# Patient Record
Sex: FEMALE | Race: WHITE | NOT HISPANIC OR LATINO | Employment: UNEMPLOYED | URBAN - METROPOLITAN AREA
[De-identification: names, ages, dates, MRNs, and addresses within clinical notes are randomized per-mention and may not be internally consistent; named-entity substitution may affect disease eponyms.]

---

## 2017-08-31 ENCOUNTER — TRANSFERRED RECORDS (OUTPATIENT)
Dept: HEALTH INFORMATION MANAGEMENT | Facility: CLINIC | Age: 42
End: 2017-08-31

## 2017-09-12 ENCOUNTER — HOSPITAL ENCOUNTER (INPATIENT)
Facility: CLINIC | Age: 42
LOS: 1 days | Discharge: HOME | End: 2017-09-14
Attending: FAMILY MEDICINE | Admitting: FAMILY MEDICINE
Payer: MEDICAID

## 2017-09-12 DIAGNOSIS — F32.0 MILD SINGLE CURRENT EPISODE OF MAJOR DEPRESSIVE DISORDER (H): Primary | ICD-10-CM

## 2017-09-12 DIAGNOSIS — F11.10 HEROIN ABUSE (H): ICD-10-CM

## 2017-09-12 LAB
AMPHETAMINES UR QL SCN: NEGATIVE
BARBITURATES UR QL: NEGATIVE
BENZODIAZ UR QL: NEGATIVE
CANNABINOIDS UR QL SCN: NEGATIVE
COCAINE UR QL: NEGATIVE
ETHANOL UR QL SCN: NEGATIVE
HCG UR QL: NEGATIVE
OPIATES UR QL SCN: POSITIVE

## 2017-09-12 PROCEDURE — 80320 DRUG SCREEN QUANTALCOHOLS: CPT | Performed by: FAMILY MEDICINE

## 2017-09-12 PROCEDURE — 99285 EMERGENCY DEPT VISIT HI MDM: CPT | Mod: Z6 | Performed by: FAMILY MEDICINE

## 2017-09-12 PROCEDURE — 25000132 ZZH RX MED GY IP 250 OP 250 PS 637: Performed by: FAMILY MEDICINE

## 2017-09-12 PROCEDURE — 81025 URINE PREGNANCY TEST: CPT | Performed by: FAMILY MEDICINE

## 2017-09-12 PROCEDURE — 99285 EMERGENCY DEPT VISIT HI MDM: CPT | Mod: 25 | Performed by: FAMILY MEDICINE

## 2017-09-12 PROCEDURE — 80307 DRUG TEST PRSMV CHEM ANLYZR: CPT | Performed by: FAMILY MEDICINE

## 2017-09-12 PROCEDURE — HZ2ZZZZ DETOXIFICATION SERVICES FOR SUBSTANCE ABUSE TREATMENT: ICD-10-PCS | Performed by: FAMILY MEDICINE

## 2017-09-12 RX ORDER — LORAZEPAM 1 MG/1
1 TABLET ORAL ONCE
Status: COMPLETED | OUTPATIENT
Start: 2017-09-12 | End: 2017-09-12

## 2017-09-12 RX ADMIN — LORAZEPAM 1 MG: 1 TABLET ORAL at 20:40

## 2017-09-12 ASSESSMENT — ENCOUNTER SYMPTOMS
ABDOMINAL PAIN: 1
COLOR CHANGE: 0
WEAKNESS: 0
NAUSEA: 1
SHORTNESS OF BREATH: 0
FEVER: 0
HALLUCINATIONS: 0
DIFFICULTY URINATING: 0
CONFUSION: 0
ARTHRALGIAS: 0
NECK STIFFNESS: 0
HEADACHES: 0
DECREASED CONCENTRATION: 1
DYSPHORIC MOOD: 1
EYE REDNESS: 0
WOUND: 1

## 2017-09-12 NOTE — IP AVS SNAPSHOT
[facility]    [facility] [place] [address]    Phone:  [phone]                                       After Visit Summary   9/12/2017    Serenity Jolly    MRN: [id number]           After Visit Summary Signature Page     I have received my discharge instructions, and my questions have been answered. I have discussed any challenges I see with this plan with the nurse or doctor.    ..........................................................................................................................................  Patient/Patient Representative Signature      ..........................................................................................................................................  Patient Representative Print Name and Relationship to Patient    ..................................................               ................................................  Date                                            Time    ..........................................................................................................................................  Reviewed by Signature/Title    ...................................................              ..............................................  Date                                                            Time

## 2017-09-12 NOTE — IP AVS SNAPSHOT
MRN:[id number]                      After Visit Summary   9/12/2017    Sereniyt Jolly    MRN: [id number]           Thank you!     Thank you for choosing [facility] for your care. Our goal is always to provide you with excellent care.        Patient Information     Date Of Birth          1975        Designated Caregiver       Most Recent Value    Caregiver    Will someone help with your care after discharge? no      About your hospital stay     You were admitted on:  September 13, 2017 You last received care in the:  [facility]    You were discharged on:  September 14, 2017       Who to Call     For medical emergencies, please call 911.  For non-urgent questions about your medical care, please call your primary care provider or clinic, None          Attending Provider     Provider Specialty    Payam Lange MD Emergency Medicine    Ron Bull MD Family Practice       Primary Care Provider    Physician No Ref-Primary      Further instructions from your care team       Behavioral Discharge Planning and Instructions  THANK YOU FOR CHOOSING THE [address]  [phone]    Summary: You were admitted to [facility] on 9/12/17 for detoxification from opiates.  A medical exam was performed that included lab work. You have met with a  and opted to admit to [facility].  Please take care and make your recovery a priority Serenity! It was a pleasure working with you and the treatment team wishes you the very best in your recovery!  ~Gary    Recommendation:  Residential Treatment, medication assisted therapy, psychotherapy, sober support group engagement and active work with a sponsor or  through [facility] Connection.    Main Diagnosis: Per Dr. Ron Bull MD;  304.00 (F11.20) Opioid Use Disorder Severe      Major Treatments, Procedures and Findings:  You have withdrawn from opiates  using Methodone.  You  have had a chemical dependency assessment.  You have had labs drawn and those results have been reviewed with you. Please take a copy of your lab work with you to your next primary care physician appointment.    Symptoms to Report:  If you experience more anxiety, confusion, sleeplessness, deep sadness or thoughts of suicide, notify your treatment team or notify your primary care physician. IF ANY OF THE SYMPTOMS YOU ARE EXPERIENCING ARE A MEDICAL EMERGENCY CALL 911 IMMEDIATELY.     Lifestyle Adjustment: Adjust your lifestyle to get enough sleep, relaxation, exercise and  good nutrition. Continue to develop healthy coping skills to decrease stress and promote a sober living environment. Do not use alcohol, illegal drugs or addictive medications other than what is currently prescribed. AA, NA, and  Sponsor are excellent resources for support. There has been an increase in opioid overdoses and deaths recently. Even after a short period of no drug use a persons tolerance level is lowered. It is not safe to think a person can use the same amount of drugs as they did prior to their period of no drug use. Returning to your drug using environment and contact with your drug using friends puts you at high risk for relapse as well as death associated with an overdose. www.harmreduction.org      Primary Provider:  You will schedule a follow up appointment while you are in treatment  [facility] [address] [place] [address] [phone]      Methadone Maintenance:  You report that maintenance will be set up with Joe through [facility] staff.    DISCHARGE RESOURCES:  -SMART Recovery - self management for addiction recovery:  www.smartrecovery.org    -Pathways ~ A Health Crisis Resource & Support Center: 890.195.5265.  -[facility] [phone]   -[facility] Behavioral Intake 397-291-1798 or 525-619-7451.  -Crisis Intervention: 178.758.8552 or  227.109.7837 (TTY: 162.935.6307).  Call anytime.  -Suicide Awareness Voices of Education (SAVE) (www.save.org): 908-876-XKKP (1893)  -National Suicide Prevention Line (www.mentalhealthmn.org): 738-617-SSVU (3521)  -National [place]ford on Mental Illness (www.mn.shell.org): 471.984.5643 or 412-667-6679.  -Bcdl5uxkh: text the word LIFE to 217[address] for immediate support and crisis intervention  -Mental Health Consumer/Survivor Network of [place] (www.mhcsn.net): 302.822.3306 or 082-915-5924  -Mental Health Association of [place] (www.mentalhealth.org): 649.948.9287 or 747-694-9295     -Substance Abuse and Mental Health Services (www.samhsa.gov)  -Harm Reduction Coalition (www. Harmreduction.org)  -www.prescribetoprevent.org or http://prescribetoprevent.org/video  -Poison control 0-102-544-5562   **[place] Opioid Prevention Coalition: www.opioidcoalition.org    Sober Support Group Information:  AA/NA & Sponsor/Support  -Alcoholics Anonymous (www.alcoholics-anonymous.org): for local information 24 hours/day  -AA Intergroup service office in [place] (http://www.aastpaul.org/) 459.806.8096  -AA Intergroup service office in [facility]: 377.991.9001. (http://www.aamin[place].org/)  -Narcotics Anonymous (www.nam[place].org) (719) 359-4289   **Sober Fun Activities: www.sober-activities.Advice Wallet.CTAdventure Sp. z o.o./[facility]//[facility] Recovery Connection ([facility])  [facility] connects people seeking recovery to resources that help foster and sustain long-term recovery.  Whether you are seeking resources for treatment, transportation, housing, job training, education, health care or other pathways to recovery, [facility] is a great place to start.    Phone: 189.140.8022.  www.[place]Innovations.Zipdial (Great listing of all types of recovery and non-recovery related resources)    General Medication Instructions:   See your medication sheet(s) for instructions.   Take all medicines as directed.  Make no changes unless your doctor suggests them.   Go to all your  "doctor visits.  Be sure to have all your required lab tests. This way, your medicines can be refilled on time.  Do not use any drugs not prescribed by your provider.  AA/NA and Sponsors are excellent resources for support  Avoid alcohol.    Any follow up concerns:  Nursing questions call the [facility] [phone]  Medical Record call [phone]  Outpatient Behavioral Intake call [phone]  LP+ Wait List/Bed Availability call [phone] ([place])    The entire treatment team has appreciated the opportunity to work with you Serenity.  We wish you the best in the future and with your lifelong recovery goals. Please bring this discharge folder with you to all follow up appointments.  It contains your lab results, diagnosis, medication list and discharge recommendations.    THANK YOU FOR CHOOSING THE [facility]     Pending Results     Date and Time Order Name Status Description    9/14/2017 0030 Hepatitis C antibody In process     9/14/2017 0030 HIV Antigen Antibody Combo In process             Statement of Approval     Ordered          09/14/17 0806  I have reviewed and agree with all the recommendations and orders detailed in this document.  EFFECTIVE NOW     Approved and electronically signed by:  Ron Bull MD             Admission Information     Date & Time Provider Department Dept. Phone    9/12/2017 Ron Bull MD [facility] [phone]      Your Vitals Were     Blood Pressure Pulse Temperature Respirations Height Weight    135/87 83 98.2  F (36.8  C) 16 1.676 m (5' 6\") 62.1 kg (137 lb)    Last Period Pulse Oximetry BMI (Body Mass Index)             09/12/2017 (Exact Date) 98% 22.11 kg/m2         [employer] Information     [employer] lets you send messages to your doctor, view your test results, renew your prescriptions, schedule appointments and more. To sign up, go to [email or web address] . Click on \"Log in\" on the left side " "of the screen, which will take you to the Welcome page. Then click on \"Sign up Now\" on the right side of the page.     You will be asked to enter the access code listed below, as well as some personal information. Please follow the directions to create your username and password.     Your access code is: [email or web address]  Expires: 2017  8:59 AM     Your access code will  in 90 days. If you need help or a new code, please call your [facility] clinic or [phone].        Care EveryWhere ID     This is your Care EveryWhere ID. This could be used by other organizations to access your [facility] medical records  [id number]        Equal Access to Services     BREN ZELAYA : Irvin Bhandari, nora parekh, ruchi howell, shalini heard. So [facility] [phone].    ATENCIÓN: Si habla español, tiene a restrepo disposición servicios gratuitos de asistencia lingüística. Llame al [phone].    We comply with applicable federal civil rights laws and [place] laws. We do not discriminate on the basis of race, color, national origin, age, disability sex, sexual orientation or gender identity.               Review of your medicines      CONTINUE these medicines which may have CHANGED, or have new prescriptions. If we are uncertain of the size of tablets/capsules you have at home, strength may be listed as something that might have changed.        Dose / Directions    PARoxetine 30 MG tablet   Commonly known as:  PAXIL   This may have changed:  medication strength   Used for:  Mild single current episode of major depressive disorder (H)        Dose:  30 mg   Take 1 tablet (30 mg) by mouth daily   Quantity:  30 tablet   Refills:  1            Where to get your medicines      These medications were sent to [facility] [place], [place] - [address]  [address] [facility] [address] [address]     Phone:  [phone]     PARoxetine 30 MG tablet             "    Protect others around you: Learn how to safely use, store and throw away your medicines at www.disposemymeds.org.             Medication List: This is a list of all your medications and when to take them. Check marks below indicate your daily home schedule. Keep this list as a reference.      Medications           Morning Afternoon Evening Bedtime As Needed    PARoxetine 30 MG tablet   Commonly known as:  PAXIL   Take 1 tablet (30 mg) by mouth daily   Last time this was given:  30 mg on 9/14/2017  8:19 AM

## 2017-09-13 PROBLEM — F11.10 HEROIN ABUSE (H): Status: ACTIVE | Noted: 2017-09-13

## 2017-09-13 PROCEDURE — 25000132 ZZH RX MED GY IP 250 OP 250 PS 637: Performed by: FAMILY MEDICINE

## 2017-09-13 PROCEDURE — 12800012 ZZH R&B CD MH INTERMEDIATE ADULT

## 2017-09-13 PROCEDURE — 99222 1ST HOSP IP/OBS MODERATE 55: CPT | Performed by: FAMILY MEDICINE

## 2017-09-13 RX ORDER — LORAZEPAM 1 MG/1
1 TABLET ORAL
Status: COMPLETED | OUTPATIENT
Start: 2017-09-13 | End: 2017-09-13

## 2017-09-13 RX ORDER — HYDROXYZINE HYDROCHLORIDE 25 MG/1
25-50 TABLET, FILM COATED ORAL EVERY 4 HOURS PRN
Status: DISCONTINUED | OUTPATIENT
Start: 2017-09-13 | End: 2017-09-14 | Stop reason: HOSPADM

## 2017-09-13 RX ORDER — GABAPENTIN 300 MG/1
300 CAPSULE ORAL 3 TIMES DAILY
Status: DISCONTINUED | OUTPATIENT
Start: 2017-09-13 | End: 2017-09-14 | Stop reason: HOSPADM

## 2017-09-13 RX ORDER — TRAZODONE HYDROCHLORIDE 100 MG/1
100 TABLET ORAL
Status: DISCONTINUED | OUTPATIENT
Start: 2017-09-13 | End: 2017-09-14 | Stop reason: HOSPADM

## 2017-09-13 RX ORDER — METHADONE HYDROCHLORIDE 5 MG/5ML
30 SOLUTION ORAL ONCE
Status: COMPLETED | OUTPATIENT
Start: 2017-09-13 | End: 2017-09-13

## 2017-09-13 RX ADMIN — NICOTINE POLACRILEX 4 MG: 4 GUM, CHEWING ORAL at 14:53

## 2017-09-13 RX ADMIN — NICOTINE POLACRILEX 4 MG: 4 GUM, CHEWING ORAL at 16:14

## 2017-09-13 RX ADMIN — PAROXETINE HYDROCHLORIDE 30 MG: 20 TABLET, FILM COATED ORAL at 08:20

## 2017-09-13 RX ADMIN — HYDROXYZINE HYDROCHLORIDE 50 MG: 25 TABLET ORAL at 06:24

## 2017-09-13 RX ADMIN — HYDROXYZINE HYDROCHLORIDE 50 MG: 25 TABLET ORAL at 01:16

## 2017-09-13 RX ADMIN — NICOTINE POLACRILEX 4 MG: 4 GUM, CHEWING ORAL at 17:58

## 2017-09-13 RX ADMIN — METHADONE HYDROCHLORIDE 30 MG: 5 SOLUTION ORAL at 10:12

## 2017-09-13 RX ADMIN — GABAPENTIN 300 MG: 300 CAPSULE ORAL at 14:53

## 2017-09-13 RX ADMIN — HYDROXYZINE HYDROCHLORIDE 50 MG: 25 TABLET ORAL at 13:07

## 2017-09-13 RX ADMIN — LORAZEPAM 1 MG: 1 TABLET ORAL at 01:16

## 2017-09-13 RX ADMIN — HYDROXYZINE HYDROCHLORIDE 50 MG: 25 TABLET ORAL at 17:58

## 2017-09-13 ASSESSMENT — ACTIVITIES OF DAILY LIVING (ADL)
LAUNDRY: WITH SUPERVISION
GROOMING: INDEPENDENT
DRESS: INDEPENDENT
ORAL_HYGIENE: INDEPENDENT

## 2017-09-13 NOTE — ED PROVIDER NOTES
[facility] EMERGENCY DEPARTMENT ([facility])    9/12/17       History     Chief Complaint   Patient presents with     Addiction Problem     has a bed reserved. last used IV heroin 2 days ago, used about 1/2 gm. Per pt she uses heroin everyday. Denies any other illicit drug use or alcohol use     HPI  Serenity Jolly is a 42 year old female with a medical history of cerebral infarction who presents to the Emergency Department for evaluation of an addiction problem to heroin. Patient reports using approximately 0.5 grams of IV heroin a day since January of 2016 with her last use yesterday. Prior to that, patient had an alcohol problem, she went through treatment for alcohol; but then started using heroin. She denies any alcohol use since January of 2016. She denies any history of hallucinations, suicidal ideation, homicidal ideation or treatment/detoxy since 1/2016. She reports using heroin mainly in the wrists, but denies any infections at the IV sites. Patient has a detox bed reserved. She is wanting to get into a methadone program. She notes getting Suboxone on the street in the past.    I have reviewed the Medications, Allergies, Past Medical and Surgical History, and Social History in the App[employer] system.    Past Medical History:   Diagnosis Date     Cerebral infarction (H)     2 heart attacks 4/2016       Past Surgical History:   Procedure Laterality Date     COSMETIC SURGERY       ENT SURGERY         No family history on file.    Social History   Substance Use Topics     Smoking status: Current Every Day Smoker     Smokeless tobacco: Never Used     Alcohol use No       No current facility-administered medications for this encounter.      Current Outpatient Prescriptions   Medication     PARoxetine HCl (PAXIL PO)        Allergies   Allergen Reactions     Sulfa Drugs Hives         Review of Systems   Constitutional: Negative for fever.   HENT: Negative for congestion.    Eyes: Negative for redness.  "  Respiratory: Negative for shortness of breath.    Cardiovascular: Negative for chest pain.   Gastrointestinal: Positive for abdominal pain and nausea.   Genitourinary: Negative for difficulty urinating.   Musculoskeletal: Negative for arthralgias and neck stiffness.   Skin: Positive for wound (injection left and right). Negative for color change.   Neurological: Negative for syncope, weakness and headaches.   Psychiatric/Behavioral: Positive for decreased concentration and dysphoric mood. Negative for confusion, hallucinations and suicidal ideas.       Physical Exam   BP: 136/90  Pulse: 99  Temp: 97.1  F (36.2  C)  Resp: 16  Height: 167.6 cm (5' 6\")  Weight: 62.3 kg (137 lb 6.4 oz)  SpO2: 100 %  Physical Exam   Constitutional: She is oriented to person, place, and time. She appears well-developed and well-nourished. She appears distressed.   Patient eating in mild distress cooperative     HENT:   Head: Normocephalic and atraumatic.   Eyes: Conjunctivae and EOM are normal. Pupils are equal, round, and reactive to light. No scleral icterus.   Neck: Normal range of motion. Neck supple.   Cardiovascular: Regular rhythm.    Pulmonary/Chest: No stridor. No respiratory distress.   Abdominal: She exhibits no distension. There is no tenderness.   Musculoskeletal: She exhibits no edema, tenderness or deformity.   Neurological: She is alert and oriented to person, place, and time. She has normal reflexes. No cranial nerve deficit. Coordination normal.   Skin: Skin is warm and dry. No rash noted. She is not diaphoretic. No erythema. No pallor.   Injection of left and right forearms without infection or phlebitis     Psychiatric:   Flat but approp no SI or HI or delusional thoughts.   Nursing note and vitals reviewed.      ED Course   7:29 PM  The patient was seen and examined by Payam Lange MD in Room ED12.     ED Course     Patient eval in the ER.  Drug screen positive for opiates.  Patient was upset waiting for bed.  Told " "nurse she was going to leave and overdose on heroin.  Patient given ativan 1mg po and resting.  Patient told tech later that she \"was just kidding\"  Patient not currently SI  Is voluntary admit to 3A detox.  Orders written.          Procedures             Critical Care time:  none             Labs Ordered and Resulted from Time of ED Arrival Up to the Time of Departure from the ED   DRUG ABUSE SCREEN 6 CHEM DEP URINE ([facility]) - Abnormal; Notable for the following:        Result Value    Opiates Qualitative Urine Positive (*)     All other components within normal limits   HCG QUALITATIVE URINE     Results for orders placed or performed during the hospital encounter of 09/12/17   Drug abuse screen 6 urine (tox)   Result Value Ref Range    Amphetamine Qual Urine Negative NEG^Negative    Barbiturates Qual Urine Negative NEG^Negative    Benzodiazepine Qual Urine Negative NEG^Negative    Cannabinoids Qual Urine Negative NEG^Negative    Cocaine Qual Urine Negative NEG^Negative    Ethanol Qual Urine Negative NEG^Negative    Opiates Qualitative Urine Positive (A) NEG^Negative   HCG qualitative urine   Result Value Ref Range    HCG Qual Urine Negative NEG^Negative            Assessments & Plan (with Medical Decision Making)  43 yo female hx of heroin abuse for last 1 1/2 years. Prev etoh treatment, now IV heroin 1/2 gram IV daily. To er for detox. Last used yesterday. Stable. Patient denies current SI. Voluntary admit to 3A detox. Dr. Bull.         I have reviewed the nursing notes.    I have reviewed the findings, diagnosis, plan and need for follow up with the patient.    New Prescriptions    No medications on file       Final diagnoses:   Heroin abuse     I, Mayco Veliz, am serving as a trained medical scribe to document services personally performed by Payam Lange MD, based on the provider's statements to me.   I, Payam Lange MD, was physically present and have reviewed and verified the accuracy of this note " documented by Mayco Veliz.    9/12/2017   [facility], [place], EMERGENCY DEPARTMENT    This note was created at least in part by the use of dragon voice dictation system. Inadvertent typographical errors may still exist.  Payam Lange MD.         Payam Lange MD  09/12/17 9009

## 2017-09-13 NOTE — ED NOTES
"Pt. came out of room upset that admission was taking so long and wanted to leave.  Pt. stated \"this is Bull shit and all your doing is making me want to use.\"  \"Let me go so I can go out and OD on Heroin,\"  MD notified of statement and pt. placed on [place].  Changed into scrubs and placed on pt. watch.  "

## 2017-09-13 NOTE — H&P
DATE OF ADMISSION:  09/12/2017      CHIEF COMPLAINT:  Opiate dependence.      HISTORY OF PRESENT ILLNESS:  This is the first [facility] admission for Serenity Jolly who is a 42-year-old female.  Patient is living with her parents and her children in [place], [place].  She relocated from [place] just a month ago.  The patient was living in [place] with her kids for many years prior to moving here.  She is originally from [place] and brings her kids here every summer.  She relocated because of a loss of housing in [place] due to problems with the landlord and financial issues.      Patient enters [facility] to detox from heroin.  She has had a history of addiction for some time.  She went through treatment a year and a half ago in [place].  At that time, alcohol and cocaine were her drugs of choice.  After leaving, she started to use heroin as she met many people in treatment that were using heroin.  She started to smoke black tar heroin and became addicted to it.  She admits to progression, adverse effects, and inability to control since moving to [place] about a month ago.  She has been injecting [place] white heroin.  She has withdrawal when she tries to stop.  She now would like to go to treatment at [facility] and is interested in methadone maintenance.  We discussed the pros and cons of methadone maintenance versus buprenorphine maintenance and she is still considering her options.  She now enters for further evaluation and therapy.  Lately, she has been using only heroin.  She has used cocaine and alcohol in the past as noted above.  She denies any history of hepatitis.      She is not working.  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  She has 4 children, 1 grown.      PAST MEDICAL HISTORY:  Medical illnesses:  Patient had cardiac issues related to cocaine.  She says she had 2 heart attacks and congestive heart failure but is doing  well and is on no medications.  She did not require an angioplasty nor a surgery for her cardiac disease.  There is reference in the chart to cerebral infarction, although this is incorrect; she knows nothing about this.      Denies lung disease, liver disease, kidney disease, diabetes or epilepsy.  She has had a seizure either related to cocaine use or withdrawal in the past.      PAST SURGICAL HISTORY:  Breast augmentation, ovarian cyst surgery.      MEDICATIONS PRIOR TO ADMISSION:  Paxil 30 mg daily.      REVIEW OF SYSTEMS:   SKIN:  No rashes.   HEAD:  No headaches.   EYES:  No visual disturbance.   EARS:  No hearing loss.   MOUTH AND THROAT:  No difficulty swallowing.   PULMONARY:  No cough or shortness of breath.   CARDIOVASCULAR:  No chest pain.   GASTROINTESTINAL:  No nausea, vomiting, diarrhea or constipation.   GENITOURINARY:  Negative.   MUSCULOSKELETAL:  Negative.   NEUROLOGIC:  Negative.      PHYSICAL EXAMINATION:   VITAL SIGNS:  Temperature is 98, pulse is 80, respirations 20, blood pressure is 140/80.   GENERAL:  This is a well-developed, well-nourished 42-year-old female in no apparent distress, alert and cooperative and oriented to time, person and place.   SKIN:  Ecchymoses on the forearms, left greater than right, from IV drug injection.  No abscesses are seen.   HEAD:  Normal.   EYES:  Pupils equal, round, regular and reactive to light.  Conjunctivae normal.  Sclerae normal.   EARS:  Normal.   NOSE:  Normal.   MOUTH AND THROAT:  Lips normal.  Tongue normal.  Pharynx normal.   NECK:  Supple.  No masses, no thyroid enlargement.  Lymph nodes normal anterior and posterior cervical region.   PULMONARY:  Lungs clear to auscultation, good inspiration, good expiration, no wheezes, no rhonchi, no rales.   CARDIOVASCULAR:  Heart regular rate and rhythm, no murmurs.  Good peripheral pulses, no edema.   GASTROINTESTINAL:  Abdomen soft, no distention, tenderness or rigidity.  Bowel sounds normal.  No masses,  no organomegaly.   EXTREMITIES:  Full range of motion of all the extremities.  No inflammation of the ankles, knees, hips, hands, shoulders or elbows bilaterally.   NEUROLOGIC:  Motor normal.  Sensory normal.  Coordination normal.   MENTAL STATUS:  Oriented to time, place, person and situation.  Attitude is cooperative.  Insight fair, judgment fair.      ASSESSMENT:   1.  Opioid dependence.   2.  History of cocaine dependence.   3.  History of alcohol dependence.      PLAN:  Detox with methadone.  Patient is interested in methadone maintenance at the [facility] and treatment at [facility].  I discussed with her the possibility of Suboxone maintenance and she will consider her options.         MARJAN BOLAÑOS MD             D: 2017 14:32   T: 2017 16:20   MT: BILL      Name:     GUI JENNINGS   MRN:      [id number]        Account:      [id number]   :      1975           Admitted:     [id number]      Document: [id number]

## 2017-09-14 VITALS
HEART RATE: 83 BPM | RESPIRATION RATE: 16 BRPM | WEIGHT: 137 LBS | TEMPERATURE: 98.2 F | BODY MASS INDEX: 22.02 KG/M2 | HEIGHT: 66 IN | OXYGEN SATURATION: 98 % | DIASTOLIC BLOOD PRESSURE: 87 MMHG | SYSTOLIC BLOOD PRESSURE: 135 MMHG

## 2017-09-14 LAB
ALBUMIN SERPL-MCNC: 3.7 G/DL (ref 3.4–5)
ALP SERPL-CCNC: 83 U/L (ref 40–150)
ALT SERPL W P-5'-P-CCNC: 21 U/L (ref 0–50)
ANION GAP SERPL CALCULATED.3IONS-SCNC: 7 MMOL/L (ref 3–14)
AST SERPL W P-5'-P-CCNC: 8 U/L (ref 0–45)
BILIRUB SERPL-MCNC: 0.3 MG/DL (ref 0.2–1.3)
BUN SERPL-MCNC: 12 MG/DL (ref 7–30)
CALCIUM SERPL-MCNC: 8.5 MG/DL (ref 8.5–10.1)
CHLORIDE SERPL-SCNC: 107 MMOL/L (ref 94–109)
CO2 SERPL-SCNC: 27 MMOL/L (ref 20–32)
CREAT SERPL-MCNC: 0.57 MG/DL (ref 0.52–1.04)
ERYTHROCYTE [DISTWIDTH] IN BLOOD BY AUTOMATED COUNT: 13.4 % (ref 10–15)
GFR SERPL CREATININE-BSD FRML MDRD: >90 ML/MIN/1.7M2
GLUCOSE SERPL-MCNC: 78 MG/DL (ref 70–99)
HCT VFR BLD AUTO: 37.5 % (ref 35–47)
HCV AB SERPL QL IA: NONREACTIVE
HGB BLD-MCNC: 12.7 G/DL (ref 11.7–15.7)
HIV 1+2 AB+HIV1 P24 AG SERPL QL IA: NONREACTIVE
MCH RBC QN AUTO: 31.8 PG (ref 26.5–33)
MCHC RBC AUTO-ENTMCNC: 33.9 G/DL (ref 31.5–36.5)
MCV RBC AUTO: 94 FL (ref 78–100)
PLATELET # BLD AUTO: 345 10E9/L (ref 150–450)
POTASSIUM SERPL-SCNC: 4.1 MMOL/L (ref 3.4–5.3)
PROT SERPL-MCNC: 7 G/DL (ref 6.8–8.8)
RBC # BLD AUTO: 3.99 10E12/L (ref 3.8–5.2)
SODIUM SERPL-SCNC: 141 MMOL/L (ref 133–144)
WBC # BLD AUTO: 8.7 10E9/L (ref 4–11)

## 2017-09-14 PROCEDURE — 25000132 ZZH RX MED GY IP 250 OP 250 PS 637: Performed by: FAMILY MEDICINE

## 2017-09-14 PROCEDURE — 99238 HOSP IP/OBS DSCHRG MGMT 30/<: CPT | Performed by: FAMILY MEDICINE

## 2017-09-14 PROCEDURE — 85027 COMPLETE CBC AUTOMATED: CPT | Performed by: FAMILY MEDICINE

## 2017-09-14 PROCEDURE — 87389 HIV-1 AG W/HIV-1&-2 AB AG IA: CPT | Performed by: FAMILY MEDICINE

## 2017-09-14 PROCEDURE — 86803 HEPATITIS C AB TEST: CPT | Performed by: FAMILY MEDICINE

## 2017-09-14 PROCEDURE — 80053 COMPREHEN METABOLIC PANEL: CPT | Performed by: FAMILY MEDICINE

## 2017-09-14 PROCEDURE — 36415 COLL VENOUS BLD VENIPUNCTURE: CPT | Performed by: FAMILY MEDICINE

## 2017-09-14 RX ORDER — METHADONE HYDROCHLORIDE 5 MG/5ML
30 SOLUTION ORAL ONCE
Status: COMPLETED | OUTPATIENT
Start: 2017-09-14 | End: 2017-09-14

## 2017-09-14 RX ORDER — PAROXETINE 30 MG/1
30 TABLET, FILM COATED ORAL DAILY
Qty: 30 TABLET | Refills: 1 | Status: SHIPPED | OUTPATIENT
Start: 2017-09-14 | End: 2018-10-13

## 2017-09-14 RX ADMIN — HYDROXYZINE HYDROCHLORIDE 50 MG: 25 TABLET ORAL at 04:12

## 2017-09-14 RX ADMIN — PAROXETINE HYDROCHLORIDE 30 MG: 20 TABLET, FILM COATED ORAL at 08:19

## 2017-09-14 RX ADMIN — METHADONE HYDROCHLORIDE 30 MG: 5 SOLUTION ORAL at 08:52

## 2017-09-14 RX ADMIN — GABAPENTIN 300 MG: 300 CAPSULE ORAL at 08:19

## 2017-09-14 RX ADMIN — NICOTINE POLACRILEX 4 MG: 4 GUM, CHEWING ORAL at 08:21

## 2017-09-14 NOTE — PROGRESS NOTES
09/13/17 0132   Patient Belongings   Did you bring any home meds/supplements to the hospital?  Yes   Disposition of meds  Sent to security/pharmacy per site process   Patient Belongings other (see comments)   Disposition of Belongings Tote, Security, Locked Drawer   Belongings Search Yes   Clothing Search Yes   Second Staff Gabrielle Wild: (3) black sweatshirts, pair of pink shoes, pair of white shoes, yellow sweatshirt, pink jacket, red coat, grey sweater, pink sweater, grey sweatpants, vikings scarf, black shirt, blue jeans, white top, pink top, white sock, blue sock, black sandals, brown sandals, yellow purse containing scissors an tweezers  White purse with personal stuff, Brown purse with personal stuff and slippers      Locked Drawer: Nestor with charging chord, cell phone cracked screen no  no wallet    Brown wallet, phone and .    SECURITY ENV # [id number]  - 11 VISA Cards, 1 MasterCard, [employer] Card, [place] Drivers License, Social Security Card, Non-Tez Card.    V[facility]     Security: $6.29 cash, non-tez ID    A               Admission:  I am responsible for any personal items that are not sent to the safe or pharmacy.  [facility] is not responsible for loss, theft or damage of any property in my possession.    Signature:  _________________________________ Date: _______  Time: _____                                              Staff Signature:  ____________________________ Date: ________  Time: _____      2nd Staff person, if patient is unable/unwilling to sign:    Signature: ________________________________ Date: ________  Time: _____     Discharge:  [facility] has returned all of my personal belongings:    Signature: _________________________________ Date: ________  Time: _____                                          Staff Signature:  ____________________________ Date: ________  Time: _____         
"Pt denies SI/SIB/HI/Hallucinations. Pt rates anxiety 8/10 and depression 8/10. Pt slept well.  Pts d/c plan would be [facility] or [facility]. CM updated about d/c plan.  Pt was slightly agitated about delay in medication administration this morning. Pt was able to remain calm and stated \"its been forever since I have gone this long without something. I am practicing patience today.\" Pt remain respectful and clam towards staff and peers.   Writer spoke with Dr. Bull and ordered 30mg Methadone per pts wish. Her plan is d/c on Methadone taper. One time order for 30mg per Dr. Bull. He will follow up with pt.   Pt stated she was accepted at [facility] and they have an opening tomorrow. She spoke with Gary. They do not do intake appts on Friday. If pt is unable to go tomorrow she will wait until Monday. Writer asked if bed would still be available and pt felt one would be.   "
Case Management Note  9/13/2017    Writer met with pt to initiate discharge planning. Pt reports she had a rule 25 completed at [facility] and has been approved for admission to [facility]. Pt signed GAIL's for [facility] and [facility]. Writer spoke with Mandy at [facility]. She reports they do not accept admissions on Fridays, but will see if they can make an exception. Writer informed Mandy, since pt just admitted to detox, she would not be able to admit there on Thursday, 9/14/17.    Addendum    Writer spoke with pt's 3A physician. He reports if pt will continue on methadone maintenance she can discharge on Thursday, 9/14/17. Pt reports she will continue on methadone maintenance and would like to discharge Thursday. Dr Bull notified. Mandy at [facility] notified. Pt will be picked up on Thursday morning 9/14/17 at 10:00 am. Case management complete.    Gary Mann MA, [facility]  
Pt discharged to [facility], picked up and transported by [facility] staff with all belongings and medications.  Acknowledged understanding of all discharge instructions. Walked off unit by Roger MOHAN.   
done

## 2017-09-14 NOTE — DISCHARGE SUMMARY
DATE OF DISCHARGE:  2017      HISTORY OF PRESENT ILLNESS:  Gui Jennings is a 42-year-old female admitted to [facility] for detox and treatment.  Heroin is her drug of choice.  She has recently moved from [place].  She is originally from [place], but she lived in [place] for over a decade.  She has had an addiction to alcohol and cocaine in the past and went through treatment a year and a half ago and upon getting out she became addicted to heroin.  She has been injecting heroin for the past several months.  She came in with plans to detox and then go on methadone maintenance and go to [facility].      HOSPITAL COURSE:  The patient was given 2 doses of methadone 30 mg and was discharged on 2017.  She will go to [facility] women's program.  She will go to [place] for methadone maintenance.      Laboratory work was pending at the time of this dictation.  The patient will be advised of the results if abnormal.      DISCHARGE MEDICATIONS:  She was discharged on Paxil 30 mg daily.      DISCHARGE DIAGNOSIS:  Opiate dependence.      Thirty minutes were spent with the patient and record in completion of this discharge summary with over 50% of time spent in counseling and coordination of care.         MARJAN BOLAÑOS MD             D: 2017 08:09   T: 2017 08:30   MT: LUISA      Name:     GUI JENNINGS   MRN:      [id number]        Account:        [id number]   :      1975           Admit Date:     [id number]                                  Discharge Date:       Document: [id number]

## 2017-09-14 NOTE — DISCHARGE INSTRUCTIONS
Behavioral Discharge Planning and Instructions  THANK YOU FOR CHOOSING THE [facility]  3A  [phone]    Summary: You were admitted to Station 3A on 9/12/17 for detoxification from opiates.  A medical exam was performed that included lab work. You have met with a  and opted to admit to [facility].  Please take care and make your recovery a priority Serenity! It was a pleasure working with you and the treatment team wishes you the very best in your recovery!  ~Gary    Recommendation:  Residential Treatment, medication assisted therapy, psychotherapy, sober support group engagement and active work with a sponsor or  through [facility].    Main Diagnosis: Per Dr. Ron Blul MD;  304.00 (F11.20) Opioid Use Disorder Severe      Major Treatments, Procedures and Findings:  You have withdrawn from opiates  using Methodone.  You have had a chemical dependency assessment.  You have had labs drawn and those results have been reviewed with you. Please take a copy of your lab work with you to your next primary care physician appointment.    Symptoms to Report:  If you experience more anxiety, confusion, sleeplessness, deep sadness or thoughts of suicide, notify your treatment team or notify your primary care physician. IF ANY OF THE SYMPTOMS YOU ARE EXPERIENCING ARE A MEDICAL EMERGENCY CALL 911 IMMEDIATELY.     Lifestyle Adjustment: Adjust your lifestyle to get enough sleep, relaxation, exercise and  good nutrition. Continue to develop healthy coping skills to decrease stress and promote a sober living environment. Do not use alcohol, illegal drugs or addictive medications other than what is currently prescribed. AA, NA, and  Sponsor are excellent resources for support. There has been an increase in opioid overdoses and deaths recently. Even after a short period of no drug use a persons tolerance level is lowered. It is not safe to think a person can use the  same amount of drugs as they did prior to their period of no drug use. Returning to your drug using environment and contact with your drug using friends puts you at high risk for relapse as well as death associated with an overdose. www.harmreduction.org      Primary Provider:  You will schedule a follow up appointment while you are in treatment  [facility]  [address] [place] [address]  [phone]      Methadone Maintenance:  You report that maintenance will be set up with Joe through [facility] staff.    DISCHARGE RESOURCES:  -SMART Recovery - self management for addiction recovery:  www.smartrecovery.org    -Pathways ~ A Health Crisis Resource & Support Center: 598.126.4186.  -[facility] [phone]   -[facility] Behavioral Intake 954-222-2773 or 283-791-9530.  -Crisis Intervention: 340.352.5513 or 488-374-3585 (TTY: 580.124.4946).  Call anytime.  -Suicide Awareness Voices of Education (SAVE) (www.save.org): 816-962-IMKW (0644)  -National Suicide Prevention Line (www.mentalhealthmn.org): 485-817-FCID (8340)  -National [place] on Mental Illness (www.mn.shell.org): 282.632.7529 or 548-496-1915.  -Gguv0pkxv: text the word LIFE to 92859 for immediate support and crisis intervention  -Mental Health Consumer/Survivor Network of [place] (www.mhcsn.net): 346.404.4341 or 775-477-7737  -Mental Health Association of [place] (www.mentalhealth.org): 206.162.8980 or 469-023-5216     -Substance Abuse and Mental Health Services (www.samhsa.gov)  -Harm Reduction Coalition (www. Harmreduction.org)  -www.prescribetoprevent.org or http://prescribetoprevent.org/video  -Poison control 0-276-111-1846   **[place] Opioid Prevention Coalition: www.opioidcoalition.org    Sober Support Group Information:  AA/NA & Sponsor/Support  -Alcoholics Anonymous (www.alcoholics-anonymous.org): for local information 24 hours/day  -AA Intergroup service office in [place]  (http://www.aastpaul.org/) 304.613.2507  -AA Intergroup service office in [facility]: 865.576.7532. (http://www.aamin[place].org/)  -Narcotics Anonymous (www.nam[place].org) (417) 315-3059   **Sober Fun Activities: www.soberTURN8activities.[employer]/[facility]//[facility] Recovery Connection ([facility])  [facility] connects people seeking recovery to resources that help foster and sustain long-term recovery.  Whether you are seeking resources for treatment, transportation, housing, job training, education, health care or other pathways to recovery, [facility] is a great place to start.    Phone: 817.474.9156.  www.[place]VisionGate (Great listing of all types of recovery and non-recovery related resources)    General Medication Instructions:   See your medication sheet(s) for instructions.   Take all medicines as directed.  Make no changes unless your doctor suggests them.   Go to all your doctor visits.  Be sure to have all your required lab tests. This way, your medicines can be refilled on time.  Do not use any drugs not prescribed by your provider.  AA/NA and Sponsors are excellent resources for support  Avoid alcohol.    Any follow up concerns:  Nursing questions call the [address] [phone]  Medical Record call [phone]  Outpatient Behavioral Intake call [phone]  LP+ Wait List/Bed Availability call [phone] ([place])    The entire treatment team has appreciated the opportunity to work with you Serenity.  We wish you the best in the future and with your lifelong recovery goals. Please bring this discharge folder with you to all follow up appointments.  It contains your lab results, diagnosis, medication list and discharge recommendations.    THANK YOU FOR CHOOSING THE [facility]

## 2018-10-13 ENCOUNTER — HOSPITAL ENCOUNTER (EMERGENCY)
Facility: CLINIC | Age: 43
Discharge: HOME | End: 2018-10-13
Attending: EMERGENCY MEDICINE | Admitting: EMERGENCY MEDICINE
Payer: COMMERCIAL

## 2018-10-13 VITALS
SYSTOLIC BLOOD PRESSURE: 107 MMHG | TEMPERATURE: 98.2 F | BODY MASS INDEX: 22.6 KG/M2 | RESPIRATION RATE: 8 BRPM | DIASTOLIC BLOOD PRESSURE: 75 MMHG | OXYGEN SATURATION: 98 % | WEIGHT: 140 LBS | HEART RATE: 80 BPM

## 2018-10-13 DIAGNOSIS — R07.9 ACUTE CHEST PAIN: ICD-10-CM

## 2018-10-13 LAB
ALBUMIN SERPL-MCNC: 4 G/DL (ref 3.4–5)
ALP SERPL-CCNC: 69 U/L (ref 40–150)
ALT SERPL W P-5'-P-CCNC: 27 U/L (ref 0–50)
ANION GAP SERPL CALCULATED.3IONS-SCNC: 7 MMOL/L (ref 3–14)
AST SERPL W P-5'-P-CCNC: 25 U/L (ref 0–45)
BASOPHILS # BLD AUTO: 0.1 10E9/L (ref 0–0.2)
BASOPHILS NFR BLD AUTO: 1.1 %
BILIRUB SERPL-MCNC: 0.5 MG/DL (ref 0.2–1.3)
BUN SERPL-MCNC: 13 MG/DL (ref 7–30)
CALCIUM SERPL-MCNC: 8.3 MG/DL (ref 8.5–10.1)
CHLORIDE SERPL-SCNC: 107 MMOL/L (ref 94–109)
CO2 SERPL-SCNC: 27 MMOL/L (ref 20–32)
CREAT SERPL-MCNC: 0.64 MG/DL (ref 0.52–1.04)
DIFFERENTIAL METHOD BLD: NORMAL
EOSINOPHIL # BLD AUTO: 0.3 10E9/L (ref 0–0.7)
EOSINOPHIL NFR BLD AUTO: 7 %
ERYTHROCYTE [DISTWIDTH] IN BLOOD BY AUTOMATED COUNT: 12.9 % (ref 10–15)
GFR SERPL CREATININE-BSD FRML MDRD: >90 ML/MIN/1.7M2
GLUCOSE SERPL-MCNC: 97 MG/DL (ref 70–99)
HCT VFR BLD AUTO: 38.7 % (ref 35–47)
HGB BLD-MCNC: 12.6 G/DL (ref 11.7–15.7)
IMM GRANULOCYTES # BLD: 0 10E9/L (ref 0–0.4)
IMM GRANULOCYTES NFR BLD: 0.4 %
LYMPHOCYTES # BLD AUTO: 1.3 10E9/L (ref 0.8–5.3)
LYMPHOCYTES NFR BLD AUTO: 27.3 %
MCH RBC QN AUTO: 31.6 PG (ref 26.5–33)
MCHC RBC AUTO-ENTMCNC: 32.6 G/DL (ref 31.5–36.5)
MCV RBC AUTO: 97 FL (ref 78–100)
MONOCYTES # BLD AUTO: 0.4 10E9/L (ref 0–1.3)
MONOCYTES NFR BLD AUTO: 8.5 %
NEUTROPHILS # BLD AUTO: 2.6 10E9/L (ref 1.6–8.3)
NEUTROPHILS NFR BLD AUTO: 55.7 %
NRBC # BLD AUTO: 0 10*3/UL
NRBC BLD AUTO-RTO: 0 /100
PLATELET # BLD AUTO: 237 10E9/L (ref 150–450)
POTASSIUM SERPL-SCNC: 4.1 MMOL/L (ref 3.4–5.3)
PROT SERPL-MCNC: 7.7 G/DL (ref 6.8–8.8)
RBC # BLD AUTO: 3.99 10E12/L (ref 3.8–5.2)
SODIUM SERPL-SCNC: 141 MMOL/L (ref 133–144)
TROPONIN I SERPL-MCNC: <0.015 UG/L (ref 0–0.04)
TROPONIN I SERPL-MCNC: <0.015 UG/L (ref 0–0.04)
WBC # BLD AUTO: 4.7 10E9/L (ref 4–11)

## 2018-10-13 PROCEDURE — 80053 COMPREHEN METABOLIC PANEL: CPT | Performed by: EMERGENCY MEDICINE

## 2018-10-13 PROCEDURE — 99285 EMERGENCY DEPT VISIT HI MDM: CPT | Mod: 25 | Performed by: EMERGENCY MEDICINE

## 2018-10-13 PROCEDURE — 84484 ASSAY OF TROPONIN QUANT: CPT | Performed by: EMERGENCY MEDICINE

## 2018-10-13 PROCEDURE — 93005 ELECTROCARDIOGRAM TRACING: CPT | Mod: 76 | Performed by: EMERGENCY MEDICINE

## 2018-10-13 PROCEDURE — 25000132 ZZH RX MED GY IP 250 OP 250 PS 637: Performed by: EMERGENCY MEDICINE

## 2018-10-13 PROCEDURE — 93005 ELECTROCARDIOGRAM TRACING: CPT | Performed by: EMERGENCY MEDICINE

## 2018-10-13 PROCEDURE — 93010 ELECTROCARDIOGRAM REPORT: CPT | Mod: Z6 | Performed by: EMERGENCY MEDICINE

## 2018-10-13 PROCEDURE — 93308 TTE F-UP OR LMTD: CPT | Mod: 26 | Performed by: EMERGENCY MEDICINE

## 2018-10-13 PROCEDURE — 93308 TTE F-UP OR LMTD: CPT | Performed by: EMERGENCY MEDICINE

## 2018-10-13 PROCEDURE — 25000125 ZZHC RX 250: Performed by: EMERGENCY MEDICINE

## 2018-10-13 PROCEDURE — 85025 COMPLETE CBC W/AUTO DIFF WBC: CPT | Performed by: EMERGENCY MEDICINE

## 2018-10-13 RX ORDER — NITROGLYCERIN 0.4 MG/1
0.4 TABLET SUBLINGUAL EVERY 5 MIN PRN
Status: DISCONTINUED | OUTPATIENT
Start: 2018-10-13 | End: 2018-10-13 | Stop reason: HOSPADM

## 2018-10-13 RX ORDER — LORATADINE 10 MG/1
10 TABLET ORAL DAILY PRN
COMMUNITY
Start: 2017-10-16 | End: 2019-10-19

## 2018-10-13 RX ORDER — ASPIRIN 81 MG/1
162 TABLET, CHEWABLE ORAL ONCE
Status: COMPLETED | OUTPATIENT
Start: 2018-10-13 | End: 2018-10-13

## 2018-10-13 RX ORDER — METHADONE HYDROCHLORIDE 10 MG/ML
70 CONCENTRATE ORAL DAILY
COMMUNITY
End: 2019-10-19

## 2018-10-13 RX ORDER — BENZOYL PEROXIDE 10 G/100G
SUSPENSION TOPICAL DAILY
COMMUNITY
Start: 2017-11-20 | End: 2019-10-19

## 2018-10-13 RX ORDER — CLINDAMYCIN PHOSPHATE 10 UG/ML
LOTION TOPICAL 2 TIMES DAILY
COMMUNITY
Start: 2017-11-20 | End: 2019-10-19

## 2018-10-13 RX ORDER — FLUTICASONE PROPIONATE 50 MCG
2 SPRAY, SUSPENSION (ML) NASAL DAILY
COMMUNITY
Start: 2018-08-30 | End: 2019-10-19

## 2018-10-13 RX ORDER — NITROGLYCERIN 0.4 MG/1
0.4 TABLET SUBLINGUAL EVERY 5 MIN PRN
COMMUNITY
Start: 2017-11-20 | End: 2019-11-07

## 2018-10-13 RX ADMIN — ASPIRIN 81 MG 162 MG: 81 TABLET ORAL at 11:01

## 2018-10-13 RX ADMIN — LIDOCAINE HYDROCHLORIDE 30 ML: 20 SOLUTION ORAL; TOPICAL at 11:01

## 2018-10-13 RX ADMIN — NITROGLYCERIN 0.4 MG: 0.4 TABLET SUBLINGUAL at 11:03

## 2018-10-13 RX ADMIN — NITROGLYCERIN 0.4 MG: 0.4 TABLET SUBLINGUAL at 11:25

## 2018-10-13 NOTE — ED AVS SNAPSHOT
[facility] Emergency Department    [address] [address]    Phone:  [phone]    Fax:  [phone]                                       Serenity Jolly   MRN: [id number]    Department:  [facility] Emergency Department   Date of Visit:  10/13/2018           Patient Information     Date Of Birth          1975        Your diagnoses for this visit were:     Acute chest pain        You were seen by Alli Orellana MD.        Discharge Instructions       Return to the emergency department if you have worsening symptoms, repeated vomiting, difficulty breathing, or other concerns.  Otherwise follow-up in clinic    24 Hour Appointment Hotline       To make an appointment at any [facility], call [phone] ([phone]). If you don't have a family doctor or clinic, we will help you find one. [facility] clinics are conveniently located to serve the needs of you and your family.             Review of your medicines      Our records show that you are taking the medicines listed below. If these are incorrect, please call your family doctor or clinic.        Dose / Directions Last dose taken    benzoyl peroxide 10 % topical liquid        daily Wash face   Refills:  0        clindamycin 1 % lotion   Commonly known as:  CLEOCIN T        Apply topically 2 times daily   Refills:  0        fluticasone 50 MCG/ACT spray   Commonly known as:  FLONASE   Dose:  2 spray        Spray 2 sprays in nostril daily   Refills:  0        loratadine 10 MG tablet   Commonly known as:  CLARITIN   Dose:  10 mg        Take 10 mg by mouth daily as needed   Refills:  0        methadone 10 MG/ML (HIGH CONC) solution   Commonly known as:  DOLOPHINE-INTENSOL   Dose:  70 mg        Take 70 mg by mouth daily Elia [place]   Refills:  0        nitroGLYcerin 0.4 MG sublingual tablet   Commonly known as:  NITROSTAT   Dose:  0.4 mg        Place 0.4 mg under the tongue every 5 minutes as needed    Refills:  0                Information about OPIOIDS     PRESCRIPTION OPIOIDS: WHAT YOU NEED TO KNOW   We gave you an opioid (narcotic) pain medicine. It is important to manage your pain, but opioids are not always the best choice. You should first try all the other options your care team gave you. Take this medicine for as short a time (and as few doses) as possible.    Some activities can increase your pain, such as bandage changes or therapy sessions. It may help to take your pain medicine 30 to 60 minutes before these activities. Reduce your stress by getting enough sleep, working on hobbies you enjoy and practicing relaxation or meditation. Talk to your care team about ways to manage your pain beyond prescription opioids.    These medicines have risks:    DO NOT drive when on new or higher doses of pain medicine. These medicines can affect your alertness and reaction times, and you could be arrested for driving under the influence (DUI). If you need to use opioids long-term, talk to your care team about driving.    DO NOT operate heavy machinery    DO NOT do any other dangerous activities while taking these medicines.    DO NOT drink any alcohol while taking these medicines.     If the opioid prescribed includes acetaminophen, DO NOT take with any other medicines that contain acetaminophen. Read all labels carefully. Look for the word  acetaminophen  or  Tylenol.  Ask your pharmacist if you have questions or are unsure.    You can get addicted to pain medicines, especially if you have a history of addiction (chemical, alcohol or substance dependence). Talk to your care team about ways to reduce this risk.    All opioids tend to cause constipation. Drink plenty of water and eat foods that have a lot of fiber, such as fruits, vegetables, prune juice, apple juice and high-fiber cereal. Take a laxative (Miralax, milk of magnesia, Colace, Senna) if you don t move your bowels at least every other day. Other side  effects include upset stomach, sleepiness, dizziness, throwing up, tolerance (needing more of the medicine to have the same effect), physical dependence and slowed breathing.    Store your pills in a secure place, locked if possible. We will not replace any lost or stolen medicine. If you don t finish your medicine, please throw away (dispose) as directed by your pharmacist. The [place] Pollution Control Agency has more information about safe disposal: https://www.pca.[facility].mn.us/living-green/managing-unwanted-medications        Procedures and tests performed during your visit     Procedure/Test Number of Times Performed    CBC with platelets differential 1    Comprehensive metabolic panel 1    EKG 12 lead 1    EKG 12-lead, tracing only 1    POC US ECHO LIMITED 1    Troponin I 2      Orders Needing Specimen Collection     None      Pending Results     No orders found from 10/11/2018 to 10/14/2018.            Pending Culture Results     No orders found from 10/11/2018 to 10/14/2018.            Pending Results Instructions     If you had any lab results that were not finalized at the time of your Discharge, you can call the ED Lab Result RN at [phone]. You will be contacted by this team for any positive Lab results or changes in treatment. The nurses are available 7 days a week from 10A to 6:30P.  You can leave a message 24 hours per day and they will return your call.        Test Results From Your Hospital Stay        10/13/2018 11:05 AM      Component Results     Component Value Ref Range & Units Status    WBC 4.7 4.0 - 11.0 10e9/L Final    RBC Count 3.99 3.8 - 5.2 10e12/L Final    Hemoglobin 12.6 11.7 - 15.7 g/dL Final    Hematocrit 38.7 35.0 - 47.0 % Final    MCV 97 78 - 100 fl Final    MCH 31.6 26.5 - 33.0 pg Final    MCHC 32.6 31.5 - 36.5 g/dL Final    RDW 12.9 10.0 - 15.0 % Final    Platelet Count 237 150 - 450 10e9/L Final    Diff Method Automated Method  Final    % Neutrophils 55.7 % Final    %  Lymphocytes 27.3 % Final    % Monocytes 8.5 % Final    % Eosinophils 7.0 % Final    % Basophils 1.1 % Final    % Immature Granulocytes 0.4 % Final    Nucleated RBCs 0 0 /100 Final    Absolute Neutrophil 2.6 1.6 - 8.3 10e9/L Final    Absolute Lymphocytes 1.3 0.8 - 5.3 10e9/L Final    Absolute Monocytes 0.4 0.0 - 1.3 10e9/L Final    Absolute Eosinophils 0.3 0.0 - 0.7 10e9/L Final    Absolute Basophils 0.1 0.0 - 0.2 10e9/L Final    Abs Immature Granulocytes 0.0 0 - 0.4 10e9/L Final    Absolute Nucleated RBC 0.0  Final         10/13/2018 11:21 AM      Component Results     Component Value Ref Range & Units Status    Sodium 141 133 - 144 mmol/L Final    Potassium 4.1 3.4 - 5.3 mmol/L Final    Chloride 107 94 - 109 mmol/L Final    Carbon Dioxide 27 20 - 32 mmol/L Final    Anion Gap 7 3 - 14 mmol/L Final    Glucose 97 70 - 99 mg/dL Final    Urea Nitrogen 13 7 - 30 mg/dL Final    Creatinine 0.64 0.52 - 1.04 mg/dL Final    GFR Estimate >90 >60 mL/min/1.7m2 Final    Non  GFR Calc    GFR Estimate If Black >90 >60 mL/min/1.7m2 Final    African American GFR Calc    Calcium 8.3 (L) 8.5 - 10.1 mg/dL Final    Bilirubin Total 0.5 0.2 - 1.3 mg/dL Final    Albumin 4.0 3.4 - 5.0 g/dL Final    Protein Total 7.7 6.8 - 8.8 g/dL Final    Alkaline Phosphatase 69 40 - 150 U/L Final    ALT 27 0 - 50 U/L Final    AST 25 0 - 45 U/L Final         10/13/2018 11:21 AM      Component Results     Component Value Ref Range & Units Status    Troponin I ES <0.015 0.000 - 0.045 ug/L Final    The 99th percentile for upper reference range is 0.045 ug/L.  Troponin values   in the range of 0.045 - 0.120 ug/L may be associated with risks of adverse   clinical events.           10/13/2018 11:53 AM      [facility] Procedure Note      Limited Bedside ED Cardiac Ultrasound:    PROCEDURE: PERFORMED BY: Dr. Alli Orellana  INDICATIONS/SYMPTOM:  Chest Pain  PROBE: Cardiac phased array probe  BODY LOCATION:  "Chest  FINDINGS:   The ultrasound was performed utilizing the parasternal long axis and parasternal short axis views.  Cardiac contractility:  Present  Gross estimation of cardiac kinesis: normal  Pericardial Effusion:  None  RV:LV ratio: LV > RV  INTERPRETATION:    Chamber size and motion were grossly normal with LV > RV, normal cardiac kinesis.  No pericardial effusion was found.  There is a fluid-filled pocket seen on the ultrasound that is from prior breast augmentation.  IMAGE DOCUMENTATION: Images were archived to PACs system.              10/13/2018  2:17 PM      Component Results     Component Value Ref Range & Units Status    Troponin I ES <0.015 0.000 - 0.045 ug/L Final    The 99th percentile for upper reference range is 0.045 ug/L.  Troponin values   in the range of 0.045 - 0.120 ug/L may be associated with risks of adverse   clinical events.                  Thank you for choosing [facility]       Thank you for choosing [facility] for your care. Our goal is always to provide you with excellent care. Hearing back from our patients is one way we can continue to improve our services. Please take a few minutes to complete the written survey that you may receive in the mail after you visit with us. Thank you!        RouterShare Information     RouterShare lets you send messages to your doctor, view your test results, renew your prescriptions, schedule appointments and more. To sign up, go to [email or web address] . Click on \"Log in\" on the left side of the screen, which will take you to the Welcome page. Then click on \"Sign up Now\" on the right side of the page.     You will be asked to enter the access code listed below, as well as some personal information. Please follow the directions to create your username and password.     Your access code is: [email or web address]  Expires: 2019  2:36 PM     Your access code will  in 90 days. If you need help or a new code, please call your [facility] clinic or [phone].   "      Care EveryWhere ID     This is your Care EveryWhere ID. This could be used by other organizations to access your [facility] medical records  [id number]        Equal Access to Services     BREN ZELAYA : Irvin Bhandari, nora parekh, ruchi howell, shalini heard. [facility] [phone].    ATENCIÓN: Si habla español, tiene a restrepo disposición servicios gratuitos de asistencia lingüística. Llame al [phone].    We comply with applicable federal civil rights laws and [place] laws. We do not discriminate on the basis of race, color, national origin, age, disability, sex, sexual orientation, or gender identity.            After Visit Summary       This is your record. Keep this with you and show to your community pharmacist(s) and doctor(s) at your next visit.

## 2018-10-13 NOTE — ED PROVIDER NOTES
History     Chief Complaint   Patient presents with     Chest Pain     chest pain since thursday  has had two previous MI in past      HPI  Serenity Jolly is a 43 year old female who presents for chest pain.  Pain has been intermittent over the past 3 days, pain is sharp and feels like pressure over the left chest, nonradiating.  It does wax and wane but she is not sure what makes it better or worse.  She took nitro last night and that did seem to help, woke up this morning around 8:30 AM and the pain was worse again.  Currently moderate in severity.  She has taken a baby aspirin and 1 nitroglycerin this morning without improvement.  She denies any shortness of breath, cough, nausea, vomiting, diaphoresis.  No abdominal pain, diarrhea, dysuria, rash.  She has had prior episodes of what she describes as heart attacks in 2016 that were related to cocaine use but she does not have any coronary stents.    Problem List:    Patient Active Problem List    Diagnosis Date Noted     Heroin abuse (H) 09/13/2017     Priority: Medium     Ovarian cyst, bilateral 07/27/2015     Priority: Medium     Generalized anxiety disorder 07/27/2015     Priority: Medium     Diagnosis updated by automated process. Provider to review and confirm.          Past Medical History:    Past Medical History:   Diagnosis Date     Cerebral infarction (H)        Past Surgical History:    Past Surgical History:   Procedure Laterality Date     BREAST SURGERY      breast augmentation     COSMETIC SURGERY       ENT SURGERY       GYN SURGERY      drained R ovary       Family History:    No family history on file.    Social History:  Marital Status:   [4]  Social History   Substance Use Topics     Smoking status: Current Every Day Smoker     Smokeless tobacco: Never Used     Alcohol use No      Comment: socially        Medications:      benzoyl peroxide 10 % topical liquid   clindamycin (CLEOCIN T) 1 % lotion   fluticasone (FLONASE) 50 MCG/ACT  spray   loratadine (CLARITIN) 10 MG tablet   methadone (DOLOPHINE-INTENSOL) 10 MG/ML (HIGH CONC) solution   nitroGLYcerin (NITROSTAT) 0.4 MG sublingual tablet         Review of Systems  Pertinent positives and negatives listed in the HPI, all other systems reviewed and are negative.    Physical Exam   BP: 130/88  Pulse: 80  Heart Rate: 66  Temp: 98.2  F (36.8  C)  Resp: 20  Weight: 63.5 kg (140 lb)  SpO2: 98 %      Physical Exam   Constitutional: She is oriented to person, place, and time. She appears well-developed and well-nourished. She appears distressed.   HENT:   Head: Normocephalic and atraumatic.   Right Ear: External ear normal.   Left Ear: External ear normal.   Nose: Nose normal.   Eyes: Conjunctivae are normal. No scleral icterus.   Neck: Normal range of motion.   Cardiovascular: Normal rate and regular rhythm.    No murmur heard.  Pulses:       Radial pulses are 2+ on the right side, and 2+ on the left side.        Posterior tibial pulses are 2+ on the right side, and 2+ on the left side.   Pulmonary/Chest: Effort normal. No stridor. No respiratory distress. She has no wheezes.   Abdominal: Soft. She exhibits no distension.   Neurological: She is alert and oriented to person, place, and time.   Skin: Skin is warm and dry. She is not diaphoretic.   Psychiatric: She has a normal mood and affect. Her behavior is normal.   Nursing note and vitals reviewed.      ED Course     ED Course     Procedures    Results for orders placed during the hospital encounter of 10/13/18   POC US ECHO LIMITED    [facility] Procedure Note      Limited Bedside ED Cardiac Ultrasound:    PROCEDURE: PERFORMED BY: Dr. Alli Orellana  INDICATIONS/SYMPTOM:  Chest Pain  PROBE: Cardiac phased array probe  BODY LOCATION: Chest  FINDINGS:   The ultrasound was performed utilizing the parasternal long axis and parasternal short axis views.  Cardiac contractility:  Present  Gross estimation of cardiac kinesis:  normal  Pericardial Effusion:  None  RV:LV ratio: LV > RV  INTERPRETATION:    Chamber size and motion were grossly normal with LV > RV, normal cardiac kinesis.  No pericardial effusion was found.  There is a fluid-filled pocket seen on the ultrasound that is from prior breast augmentation.  IMAGE DOCUMENTATION: Images were archived to PACs system.                  EKG Interpretation:      Interpreted by Alli Orellana  Time reviewed: 1040  Symptoms at time of EKG: Chest pain   Rhythm: normal sinus   Rate: normal  Axis: normal  Ectopy: none  Conduction: normal  ST Segments/ T Waves: No ST-T wave changes  Q Waves: none  Comparison to prior: No old EKG available    Clinical Impression: normal EKG    Critical Care time:  none               Results for orders placed or performed during the hospital encounter of 10/13/18 (from the past 24 hour(s))   CBC with platelets differential   Result Value Ref Range    WBC 4.7 4.0 - 11.0 10e9/L    RBC Count 3.99 3.8 - 5.2 10e12/L    Hemoglobin 12.6 11.7 - 15.7 g/dL    Hematocrit 38.7 35.0 - 47.0 %    MCV 97 78 - 100 fl    MCH 31.6 26.5 - 33.0 pg    MCHC 32.6 31.5 - 36.5 g/dL    RDW 12.9 10.0 - 15.0 %    Platelet Count 237 150 - 450 10e9/L    Diff Method Automated Method     % Neutrophils 55.7 %    % Lymphocytes 27.3 %    % Monocytes 8.5 %    % Eosinophils 7.0 %    % Basophils 1.1 %    % Immature Granulocytes 0.4 %    Nucleated RBCs 0 0 /100    Absolute Neutrophil 2.6 1.6 - 8.3 10e9/L    Absolute Lymphocytes 1.3 0.8 - 5.3 10e9/L    Absolute Monocytes 0.4 0.0 - 1.3 10e9/L    Absolute Eosinophils 0.3 0.0 - 0.7 10e9/L    Absolute Basophils 0.1 0.0 - 0.2 10e9/L    Abs Immature Granulocytes 0.0 0 - 0.4 10e9/L    Absolute Nucleated RBC 0.0    Comprehensive metabolic panel   Result Value Ref Range    Sodium 141 133 - 144 mmol/L    Potassium 4.1 3.4 - 5.3 mmol/L    Chloride 107 94 - 109 mmol/L    Carbon Dioxide 27 20 - 32 mmol/L    Anion Gap 7 3 - 14 mmol/L    Glucose 97 70 - 99 mg/dL     Urea Nitrogen 13 7 - 30 mg/dL    Creatinine 0.64 0.52 - 1.04 mg/dL    GFR Estimate >90 >60 mL/min/1.7m2    GFR Estimate If Black >90 >60 mL/min/1.7m2    Calcium 8.3 (L) 8.5 - 10.1 mg/dL    Bilirubin Total 0.5 0.2 - 1.3 mg/dL    Albumin 4.0 3.4 - 5.0 g/dL    Protein Total 7.7 6.8 - 8.8 g/dL    Alkaline Phosphatase 69 40 - 150 U/L    ALT 27 0 - 50 U/L    AST 25 0 - 45 U/L   Troponin I   Result Value Ref Range    Troponin I ES <0.015 0.000 - 0.045 ug/L   POC US ECHO LIMITED    [facility] Procedure Note      Limited Bedside ED Cardiac Ultrasound:    PROCEDURE: PERFORMED BY: Dr. Alli Orellana  INDICATIONS/SYMPTOM:  Chest Pain  PROBE: Cardiac phased array probe  BODY LOCATION: Chest  FINDINGS:   The ultrasound was performed utilizing the parasternal long axis and parasternal short axis views.  Cardiac contractility:  Present  Gross estimation of cardiac kinesis: normal  Pericardial Effusion:  None  RV:LV ratio: LV > RV  INTERPRETATION:    Chamber size and motion were grossly normal with LV > RV, normal cardiac kinesis.  No pericardial effusion was found.  There is a fluid-filled pocket seen on the ultrasound that is from prior breast augmentation.  IMAGE DOCUMENTATION: Images were archived to PACs system.        Troponin I   Result Value Ref Range    Troponin I ES <0.015 0.000 - 0.045 ug/L       Medications   aspirin chewable tablet 162 mg (162 mg Oral Given 10/13/18 1101)   lidocaine (viscous) (XYLOCAINE) 2 % 15 mL, alum & mag hydroxide-simethicone (MYLANTA ES/MAALOX  ES) 15 mL GI Cocktail (30 mLs Oral Given 10/13/18 1101)       Assessments & Plan (with Medical Decision Making)   43-year-old female presents with chest pain.  Blood pressure 130/88, heart rate 80, temperature is 98.2 F, SPO2 is 98% on room air.  EKG sinus rhythm without signs of ischemia.  She is given aspirin, nitroglycerin, and a GI cocktail for symptoms.  Troponin is normal.  Bedside ultrasound shows good cardiac function  without pericardial effusion.  Electrolytes are within normal limits.  The patient is watched in the emergency department for several hours and is stable.  Repeat troponin is again normal.  Given this normal workup so far with a nonischemic EKG and 2 normal troponins over several hours this is unlikely to be ACS and she is safe to discharge with instructions to return if she has worsening symptoms or concerns, otherwise follow-up in clinic.  The patient is in agreement with this plan.    I have reviewed the nursing notes.    I have reviewed the findings, diagnosis, plan and need for follow up with the patient.       HEART Score  Background  Calculates the overall risk of adverse event in patient's presenting with chest pain.  Based on 5 criteria (each assigned 0-2 points) including suspiciousness of history, EKG, age, risk factors and troponin.    Data  43 year old female  has Heroin abuse (H); Ovarian cyst, bilateral; and Generalized anxiety disorder on her problem list.   reports that she has been smoking.  She has never used smokeless tobacco.  family history is not on file.  Lab Results   Component Value Date    TROPI <0.015 10/13/2018     Criteria   0-2 points for each of 5 items (maximum of 10 points):  Score 0- History slightly suspicious for coronary syndrome  Score 0- EKG Normal  Score 0- Age <45 years old  Score 1- One to 2 risk factors for atherosclerotic disease  Score 0- Within normal limits for troponin levels  Interpretation  Risk of adverse outcome  Heart Score: 1  Total Score 0-3- Adverse Outcome Risk 2.5% - Supports early discharge with appropriate follow-up        Discharge Medication List as of 10/13/2018  2:36 PM          Final diagnoses:   Acute chest pain       10/13/2018   [facility] EMERGENCY DEPARTMENT     Alli Orellana MD  10/13/18 [id number]

## 2018-10-13 NOTE — DISCHARGE INSTRUCTIONS
Return to the emergency department if you have worsening symptoms, repeated vomiting, difficulty breathing, or other concerns.  Otherwise follow-up in clinic

## 2018-10-13 NOTE — ED AVS SNAPSHOT
[facility] Emergency Department    [address] [address]    Phone:  [phone]    Fax:  [phone]                                       Serenity Jolly   MRN: [id number]    Department:  [facility] Emergency Department   Date of Visit:  10/13/2018           After Visit Summary Signature Page     I have received my discharge instructions, and my questions have been answered. I have discussed any challenges I see with this plan with the nurse or doctor.    ..........................................................................................................................................  Patient/Patient Representative Signature      ..........................................................................................................................................  Patient Representative Print Name and Relationship to Patient    ..................................................               ................................................  Date                                   Time    ..........................................................................................................................................  Reviewed by Signature/Title    ...................................................              ..............................................  Date                                               Time          22EPIC Rev 08/18

## 2019-10-14 ENCOUNTER — TRANSFERRED RECORDS (OUTPATIENT)
Dept: HEALTH INFORMATION MANAGEMENT | Facility: CLINIC | Age: 44
End: 2019-10-14

## 2019-10-19 ENCOUNTER — APPOINTMENT (OUTPATIENT)
Dept: GENERAL RADIOLOGY | Facility: CLINIC | Age: 44
End: 2019-10-19
Attending: NURSE PRACTITIONER
Payer: COMMERCIAL

## 2019-10-19 ENCOUNTER — HOSPITAL ENCOUNTER (EMERGENCY)
Facility: CLINIC | Age: 44
Discharge: HOME | End: 2019-10-19
Attending: NURSE PRACTITIONER | Admitting: NURSE PRACTITIONER
Payer: COMMERCIAL

## 2019-10-19 VITALS
RESPIRATION RATE: 16 BRPM | BODY MASS INDEX: 23.3 KG/M2 | HEART RATE: 106 BPM | TEMPERATURE: 98.3 F | SYSTOLIC BLOOD PRESSURE: 143 MMHG | DIASTOLIC BLOOD PRESSURE: 78 MMHG | OXYGEN SATURATION: 100 % | HEIGHT: 65 IN

## 2019-10-19 DIAGNOSIS — S62.655A: ICD-10-CM

## 2019-10-19 PROCEDURE — 99214 OFFICE O/P EST MOD 30 MIN: CPT | Mod: 25 | Performed by: NURSE PRACTITIONER

## 2019-10-19 PROCEDURE — 73140 X-RAY EXAM OF FINGER(S): CPT | Mod: LT

## 2019-10-19 PROCEDURE — 26720 TREAT FINGER FRACTURE EACH: CPT | Mod: F3 | Performed by: NURSE PRACTITIONER

## 2019-10-19 PROCEDURE — 26720 TREAT FINGER FRACTURE EACH: CPT | Mod: 54 | Performed by: NURSE PRACTITIONER

## 2019-10-19 PROCEDURE — G0463 HOSPITAL OUTPT CLINIC VISIT: HCPCS | Mod: 25 | Performed by: NURSE PRACTITIONER

## 2019-10-19 PROCEDURE — 73110 X-RAY EXAM OF WRIST: CPT | Mod: LT

## 2019-10-19 RX ORDER — HYDROXYZINE HYDROCHLORIDE 10 MG/1
10 TABLET, FILM COATED ORAL PRN
COMMUNITY
Start: 2019-07-24 | End: 2019-11-07

## 2019-10-19 ASSESSMENT — ENCOUNTER SYMPTOMS
DIAPHORESIS: 0
SHORTNESS OF BREATH: 0
VOMITING: 0
CONFUSION: 0
CONSTIPATION: 0
FEVER: 0
COUGH: 0
MYALGIAS: 0
HEADACHES: 0
DIARRHEA: 0
DIFFICULTY URINATING: 0
EYE PAIN: 0
SPEECH DIFFICULTY: 0
WHEEZING: 0
NAUSEA: 0
DYSURIA: 0
CHILLS: 0
ABDOMINAL PAIN: 0
SORE THROAT: 0

## 2019-10-19 NOTE — ED AVS SNAPSHOT
[facility] Emergency Department  [address] [address]  Phone:  [phone]  Fax:  [phone]                                    Serneity Jolly   MRN: [id number]    Department:  [facility] Emergency Department   Date of Visit:  10/19/2019           After Visit Summary Signature Page    I have received my discharge instructions, and my questions have been answered. I have discussed any challenges I see with this plan with the nurse or doctor.    ..........................................................................................................................................  Patient/Patient Representative Signature      ..........................................................................................................................................  Patient Representative Print Name and Relationship to Patient    ..................................................               ................................................  Date                                   Time    ..........................................................................................................................................  Reviewed by Signature/Title    ...................................................              ..............................................  Date                                               Time          22EPIC Rev 08/18

## 2019-10-19 NOTE — ED PROVIDER NOTES
History     Chief Complaint   Patient presents with     Hand Injury     HPI  Serenity Jolly is a 44 year old female who presents to urgent care with left hand and wrist injury that occurred 2 nights ago.  Patient reports that she tripped in the middle of the night when she was walking around in the dark and fell forward with her fingers jamming into the wall and her wrist noted to be in hyper extension.  Patient states subsequently she has noticed pain with bruising of the fourth finger and discomfort in the fifth finger.  Patient also reports generalized discomfort in the wrist region.  Patient describes her pain as aching and throbbing and patient reports pain level at best is a 6 out of a 10 and at worst is a 7 out of a 10.  Patient reports that she has taken ibuprofen 600 mg once yesterday and once today.  Patient states that she iced the injury yesterday without remarkable change in symptoms.  Patient reports that movement or use of the fingers is aggravating to her pain and rest is relieving.  Patient reports feeling well otherwise and denies any other concerns  Allergies:  Allergies   Allergen Reactions     Sulfa Drugs Hives       Problem List:    Patient Active Problem List    Diagnosis Date Noted     Heroin abuse (H) 09/13/2017     Priority: Medium     Ovarian cyst, bilateral 07/27/2015     Priority: Medium     Generalized anxiety disorder 07/27/2015     Priority: Medium     Diagnosis updated by automated process. Provider to review and confirm.          Past Medical History:    Past Medical History:   Diagnosis Date     Cerebral infarction (H)        Past Surgical History:    Past Surgical History:   Procedure Laterality Date     BREAST SURGERY      breast augmentation     COSMETIC SURGERY       ENT SURGERY       GYN SURGERY      drained R ovary       Family History:    History reviewed. No pertinent family history.    Social History:  Marital Status:   [4]  Social History     Tobacco Use  "    Smoking status: Former Smoker     Packs/day: 0.00     Last attempt to quit: 2019     Years since quittin.7     Smokeless tobacco: Never Used   Substance Use Topics     Alcohol use: No     Comment: socially     Drug use: Yes     Types: IV     Comment: Heroin        Medications:    hydrOXYzine (ATARAX) 10 MG tablet  nitroGLYcerin (NITROSTAT) 0.4 MG sublingual tablet      Review of Systems   Constitutional: Negative for chills, diaphoresis and fever.   HENT: Negative for ear pain and sore throat.    Eyes: Negative for pain.   Respiratory: Negative for cough, shortness of breath and wheezing.    Cardiovascular: Negative for chest pain.   Gastrointestinal: Negative for abdominal pain, constipation, diarrhea, nausea and vomiting.   Genitourinary: Negative for difficulty urinating and dysuria.   Musculoskeletal: Negative for myalgias.        Left wrist pain and left fourth and fifth finger pain     Skin: Negative for rash.   Neurological: Negative for speech difficulty and headaches.   Psychiatric/Behavioral: Negative for confusion and self-injury.   All other systems reviewed and are negative.      Physical Exam   BP: (!) 143/78  Pulse: 106  Temp: 98.3  F (36.8  C)  Resp: 16  Height: 165.1 cm (5' 5\")  SpO2: 100 %      Physical Exam  Vitals signs and nursing note reviewed.   Constitutional:       General: She is not in acute distress.     Appearance: She is well-developed. She is not diaphoretic.   HENT:      Head: Normocephalic and atraumatic.      Right Ear: External ear normal.      Left Ear: External ear normal.   Eyes:      General:         Right eye: No discharge.         Left eye: No discharge.      Conjunctiva/sclera: Conjunctivae normal.   Cardiovascular:      Rate and Rhythm: Normal rate and regular rhythm.      Heart sounds: Normal heart sounds. No murmur. No friction rub.   Pulmonary:      Effort: Pulmonary effort is normal. No respiratory distress.      Breath sounds: Normal breath sounds. No " stridor. No wheezing or rales.   Chest:      Chest wall: No tenderness.   Musculoskeletal:      Left wrist: She exhibits tenderness (generalized tenderness not reproducible with palpation). She exhibits normal range of motion, no bony tenderness, no swelling, no effusion, no crepitus, no deformity and no laceration.      Left hand: She exhibits tenderness (generalized fourth and fifth finger), bony tenderness (fourth finger bony tenderness from middle phalanx to distal phalanx) and swelling (with bruising noted). She exhibits normal range of motion, normal two-point discrimination, normal capillary refill, no deformity and no laceration. Normal sensation noted. Decreased sensation is not present in the ulnar distribution and is not present in the medial redistribution. Normal strength noted. She exhibits no finger abduction and no thumb/finger opposition.   Skin:     General: Skin is warm and dry.      Coloration: Skin is not pale.      Findings: No erythema or rash.   Neurological:      Mental Status: She is alert.         ED Course        Procedures    Results for orders placed or performed during the hospital encounter of 10/19/19 (from the past 24 hour(s))   XR Wrist Left G/E 3 Views    Narrative    WRIST LEFT THREE OR MORE VIEWS  10/19/2019 6:31 PM     HISTORY:  Fingers four and five, jammed into wall and subsequent pain,  bruising of distal fourth finger and pain in wrist.      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 The bones and  joints appear normal otherwise. There is no evidence of fracture in  the wrist.   Fingers XR, 2-3 views, left    Narrative    FINGER LEFT TWO OR MORE VIEWS   10/19/2019 6:32 PM     HISTORY:  Jammed finger into wall and subsequent pain.      Impression    IMPRESSION: No fifth finger fracture is evident, but there does appear  to be an oblique intra-articular fracture of the volar aspect of the  base of the middle phalanx of the ring finger. This could  represent a  superimposition of normal shadows, however, so clinical correlation is  needed. Otherwise negative.       Medications - No data to display    Assessments & Plan (with Medical Decision Making)  Serenity Jolly is a 44 year old female who presents to urgent care with left hand and wrist injury that occurred 2 nights ago.    Patient is noted to have persistent pain in the left fourth and fifth finger and reports consistent pain in the wrist that is generalized.  Patient denies any loss of range of motion or sensation.  On examination patient has bony tenderness of the fourth finger from the middle phalanx to the distal phalanx with associated bruising and trismus swelling noted.  Patient is neurovascularly stable.  There is no obvious deformities.  Patient has no reproducible tenderness of her wrist but patient reporting tenderness and requesting x-ray of the wrist.  X-ray of the wrist reveals no fractures or dislocations.  X-ray of the fingers reveals a oblique intra-articular fracture of the volar aspect of the middle phalanx of the fourth finger and the fifth finger there is no obvious fractures.  Reviewed these findings with patient.  Offered splint care versus joon taping and patient requesting splint care.  Splint applied without complications.  Reviewed finger fracture care and recommend follow-up with primary care provider in 1014 days for reevaluation.  Patient verbalized understanding.  Management with Tylenol and ibuprofen discussed.  Patient discharged in stable condition.     I have reviewed the nursing notes.    I have reviewed the findings, diagnosis, plan and need for follow up with the patient.    Discharge Medication List as of 10/19/2019  6:54 PM          Final diagnoses:   Closed nondisplaced fracture of middle phalanx of left ring finger       10/19/2019   [facility] EMERGENCY DEPARTMENT     Serenity Valenzuela, STACY CNP  10/19/19 1941

## 2019-11-06 ENCOUNTER — HOSPITAL ENCOUNTER (EMERGENCY)
Facility: CLINIC | Age: 44
Discharge: HOME | End: 2019-11-06
Attending: FAMILY MEDICINE | Admitting: FAMILY MEDICINE
Payer: COMMERCIAL

## 2019-11-06 ENCOUNTER — APPOINTMENT (OUTPATIENT)
Dept: GENERAL RADIOLOGY | Facility: CLINIC | Age: 44
End: 2019-11-06
Attending: FAMILY MEDICINE
Payer: COMMERCIAL

## 2019-11-06 VITALS
WEIGHT: 140 LBS | RESPIRATION RATE: 23 BRPM | BODY MASS INDEX: 23.3 KG/M2 | DIASTOLIC BLOOD PRESSURE: 103 MMHG | SYSTOLIC BLOOD PRESSURE: 136 MMHG | OXYGEN SATURATION: 100 % | HEART RATE: 80 BPM

## 2019-11-06 DIAGNOSIS — F15.10 METHAMPHETAMINE USE (H): ICD-10-CM

## 2019-11-06 DIAGNOSIS — R07.9 ACUTE CHEST PAIN: ICD-10-CM

## 2019-11-06 DIAGNOSIS — R03.0 ELEVATED BLOOD PRESSURE READING WITHOUT DIAGNOSIS OF HYPERTENSION: ICD-10-CM

## 2019-11-06 DIAGNOSIS — F41.9 ANXIETY: ICD-10-CM

## 2019-11-06 DIAGNOSIS — Z78.9 ALCOHOL USE: ICD-10-CM

## 2019-11-06 LAB
ALBUMIN SERPL-MCNC: 4.1 G/DL (ref 3.4–5)
ALP SERPL-CCNC: 70 U/L (ref 40–150)
ALT SERPL W P-5'-P-CCNC: 27 U/L (ref 0–50)
ANION GAP SERPL CALCULATED.3IONS-SCNC: 2 MMOL/L (ref 3–14)
AST SERPL W P-5'-P-CCNC: 21 U/L (ref 0–45)
BASOPHILS # BLD AUTO: 0.1 10E9/L (ref 0–0.2)
BASOPHILS NFR BLD AUTO: 0.8 %
BILIRUB SERPL-MCNC: 0.4 MG/DL (ref 0.2–1.3)
BUN SERPL-MCNC: 8 MG/DL (ref 7–30)
CALCIUM SERPL-MCNC: 9.4 MG/DL (ref 8.5–10.1)
CHLORIDE SERPL-SCNC: 105 MMOL/L (ref 94–109)
CO2 SERPL-SCNC: 30 MMOL/L (ref 20–32)
CREAT SERPL-MCNC: 0.63 MG/DL (ref 0.52–1.04)
D DIMER PPP FEU-MCNC: 0.3 UG/ML FEU (ref 0–0.5)
DIFFERENTIAL METHOD BLD: NORMAL
EOSINOPHIL # BLD AUTO: 0.2 10E9/L (ref 0–0.7)
EOSINOPHIL NFR BLD AUTO: 2 %
ERYTHROCYTE [DISTWIDTH] IN BLOOD BY AUTOMATED COUNT: 11.9 % (ref 10–15)
GFR SERPL CREATININE-BSD FRML MDRD: >90 ML/MIN/{1.73_M2}
GLUCOSE SERPL-MCNC: 84 MG/DL (ref 70–99)
HCG SERPL QL: NEGATIVE
HCT VFR BLD AUTO: 43.2 % (ref 35–47)
HGB BLD-MCNC: 14.3 G/DL (ref 11.7–15.7)
IMM GRANULOCYTES # BLD: 0 10E9/L (ref 0–0.4)
IMM GRANULOCYTES NFR BLD: 0.3 %
LYMPHOCYTES # BLD AUTO: 2.1 10E9/L (ref 0.8–5.3)
LYMPHOCYTES NFR BLD AUTO: 27.2 %
MCH RBC QN AUTO: 32.3 PG (ref 26.5–33)
MCHC RBC AUTO-ENTMCNC: 33.1 G/DL (ref 31.5–36.5)
MCV RBC AUTO: 98 FL (ref 78–100)
MONOCYTES # BLD AUTO: 0.5 10E9/L (ref 0–1.3)
MONOCYTES NFR BLD AUTO: 7.2 %
NEUTROPHILS # BLD AUTO: 4.7 10E9/L (ref 1.6–8.3)
NEUTROPHILS NFR BLD AUTO: 62.5 %
NRBC # BLD AUTO: 0 10*3/UL
NRBC BLD AUTO-RTO: 0 /100
PLATELET # BLD AUTO: 305 10E9/L (ref 150–450)
POTASSIUM SERPL-SCNC: 4 MMOL/L (ref 3.4–5.3)
PROT SERPL-MCNC: 7.9 G/DL (ref 6.8–8.8)
RBC # BLD AUTO: 4.43 10E12/L (ref 3.8–5.2)
SODIUM SERPL-SCNC: 137 MMOL/L (ref 133–144)
TROPONIN I SERPL-MCNC: <0.015 UG/L (ref 0–0.04)
TROPONIN I SERPL-MCNC: <0.015 UG/L (ref 0–0.04)
WBC # BLD AUTO: 7.5 10E9/L (ref 4–11)

## 2019-11-06 PROCEDURE — 96374 THER/PROPH/DIAG INJ IV PUSH: CPT | Performed by: FAMILY MEDICINE

## 2019-11-06 PROCEDURE — 85379 FIBRIN DEGRADATION QUANT: CPT | Performed by: FAMILY MEDICINE

## 2019-11-06 PROCEDURE — 80053 COMPREHEN METABOLIC PANEL: CPT | Performed by: FAMILY MEDICINE

## 2019-11-06 PROCEDURE — 93005 ELECTROCARDIOGRAM TRACING: CPT | Performed by: FAMILY MEDICINE

## 2019-11-06 PROCEDURE — 25000132 ZZH RX MED GY IP 250 OP 250 PS 637: Performed by: FAMILY MEDICINE

## 2019-11-06 PROCEDURE — 84703 CHORIONIC GONADOTROPIN ASSAY: CPT | Performed by: FAMILY MEDICINE

## 2019-11-06 PROCEDURE — 99285 EMERGENCY DEPT VISIT HI MDM: CPT | Mod: 25 | Performed by: FAMILY MEDICINE

## 2019-11-06 PROCEDURE — 25000125 ZZHC RX 250: Performed by: FAMILY MEDICINE

## 2019-11-06 PROCEDURE — 84484 ASSAY OF TROPONIN QUANT: CPT | Performed by: FAMILY MEDICINE

## 2019-11-06 PROCEDURE — 96361 HYDRATE IV INFUSION ADD-ON: CPT | Performed by: FAMILY MEDICINE

## 2019-11-06 PROCEDURE — 25000128 H RX IP 250 OP 636: Performed by: FAMILY MEDICINE

## 2019-11-06 PROCEDURE — 93010 ELECTROCARDIOGRAM REPORT: CPT | Mod: Z6 | Performed by: FAMILY MEDICINE

## 2019-11-06 PROCEDURE — 85025 COMPLETE CBC W/AUTO DIFF WBC: CPT | Performed by: FAMILY MEDICINE

## 2019-11-06 PROCEDURE — 25800030 ZZH RX IP 258 OP 636: Performed by: FAMILY MEDICINE

## 2019-11-06 PROCEDURE — 71046 X-RAY EXAM CHEST 2 VIEWS: CPT

## 2019-11-06 RX ORDER — ONDANSETRON 2 MG/ML
4 INJECTION INTRAMUSCULAR; INTRAVENOUS EVERY 30 MIN PRN
Status: DISCONTINUED | OUTPATIENT
Start: 2019-11-06 | End: 2019-11-06 | Stop reason: HOSPADM

## 2019-11-06 RX ORDER — NITROGLYCERIN 0.4 MG/1
0.4 TABLET SUBLINGUAL EVERY 5 MIN PRN
Status: DISCONTINUED | OUTPATIENT
Start: 2019-11-06 | End: 2019-11-06 | Stop reason: HOSPADM

## 2019-11-06 RX ADMIN — SODIUM CHLORIDE, POTASSIUM CHLORIDE, SODIUM LACTATE AND CALCIUM CHLORIDE 2000 ML: 600; 310; 30; 20 INJECTION, SOLUTION INTRAVENOUS at 14:45

## 2019-11-06 RX ADMIN — ONDANSETRON 4 MG: 2 INJECTION INTRAMUSCULAR; INTRAVENOUS at 15:30

## 2019-11-06 RX ADMIN — NITROGLYCERIN 0.4 MG: 0.4 TABLET SUBLINGUAL at 15:21

## 2019-11-06 RX ADMIN — LIDOCAINE HYDROCHLORIDE 30 ML: 20 SOLUTION ORAL; TOPICAL at 15:44

## 2019-11-06 RX ADMIN — NITROGLYCERIN 0.4 MG: 0.4 TABLET SUBLINGUAL at 15:27

## 2019-11-06 NOTE — ED NOTES
Pt c/o chest pain that started this AM, feels like an ache, is constant. Pt denies SOB, n/v. C/o feeling lightheaded. Pt has hx MI x 2. Pt took ASA at home. Denies having stents placed with prev MI. Pt doesn't take any medications. Pt tearful. Pt uses IV drugs won't say which drugs. Pt denies using heroine. IV attempted, unsuccessful, able to get blood for lab work. Pt declines additional attempts for IV at this time.

## 2019-11-06 NOTE — ED PROVIDER NOTES
History     Chief Complaint   Patient presents with     Chest Pain     Dizziness     LIGHTHEADED     HPI  Serenity Jolly is a 44 year old female, past medical history is significant for heroin abuse, ischemic chest pain, cocaine abuse, ACS, chronic systolic CHF, generalized anxiety disorder, bilateral ovarian cystic disease, opioid dependence, presents to the emergency department concerns of chest pain and lightheadedness.  History is obtained from the patient who is a very vague historian presents with her sister who states that they are in process of rule 25 for her for drug use.  She has been feeling vaguely unwell with respect to her energy, appetite, fatigue over the past 2 or 3 weeks.  Beginning this morning around 10:00 when she was driving her car she developed left-sided dull achy chest pain that she rated about a 5/10.  This was not associated with any shortness of breath however it was associated with lightheadedness, may have actually been preceded by the lightheadedness.  The chest discomfort is still present although has lessened since onset.  No associated nausea or vomiting.  Patient took aspirin at home and reports no change in symptoms.  She states that the chest discomfort is similar to when she had an MI in 2016.  When asked about drug use the patient states that she is not sure which drugs she uses, she was extremely difficult to get details and information from especially in this area.  She states that she uses meth, would not tell me when she last used.  Would not tell me what route that she takes it by.  She was also extremely vague about alcohol use and laughed stating that she did not use alcohol in the last 24 hours.  She would not tell me how much alcohol she would drink on a regular basis if at all.  She denies opiate use with her history being notable for heroin abuse dated 9/13/2017.  She has history for MI in 2016 while using cocaine in [place] and I was able to review some of  this in care everywhere although I cannot actually review a cardiogram direct visually.  She reportedly had an echo that demonstrated an EF of 35% at that time and then subsequently had repeat evaluation the same year at [facility]  that demonstrated a normal EF of 55-60% and essentially a normal echocardiogram.          Allergies:  Allergies   Allergen Reactions     Sulfa Drugs Hives       Problem List:    Patient Active Problem List    Diagnosis Date Noted     Heroin abuse (H) 2017     Priority: Medium     Ovarian cyst, bilateral 2015     Priority: Medium     Generalized anxiety disorder 2015     Priority: Medium     Diagnosis updated by automated process. Provider to review and confirm.          Past Medical History:    Past Medical History:   Diagnosis Date     Cerebral infarction (H)        Past Surgical History:    Past Surgical History:   Procedure Laterality Date     BREAST SURGERY      breast augmentation     COSMETIC SURGERY       ENT SURGERY       GYN SURGERY      drained R ovary       Family History:    No family history on file.    Social History:  Marital Status:   [4]  Social History     Tobacco Use     Smoking status: Former Smoker     Packs/day: 0.00     Last attempt to quit: 2019     Years since quittin.8     Smokeless tobacco: Never Used   Substance Use Topics     Alcohol use: No     Comment: socially     Drug use: Yes     Types: IV     Comment: Heroin        Medications:    hydrOXYzine (ATARAX) 10 MG tablet  nitroGLYcerin (NITROSTAT) 0.4 MG sublingual tablet          Review of Systems   All other systems reviewed and are negative.      Physical Exam   BP: (!) 149/104  Pulse: 94  Heart Rate: 101  Resp: 16  Weight: 63.5 kg (140 lb)  SpO2: 100 %      Physical Exam  Vitals signs and nursing note reviewed.   Constitutional:       Appearance: She is well-developed.      Comments: No acute distress, does not appear ill or toxic.   HENT:      Head: Normocephalic  and atraumatic.   Eyes:      Extraocular Movements: Extraocular movements intact.      Pupils: Pupils are equal, round, and reactive to light.   Neck:      Musculoskeletal: Normal range of motion and neck supple.   Cardiovascular:      Rate and Rhythm: Normal rate and regular rhythm.      Heart sounds: Normal heart sounds.   Pulmonary:      Effort: Pulmonary effort is normal.      Breath sounds: Normal breath sounds.   Abdominal:      General: Bowel sounds are normal.      Palpations: Abdomen is soft.   Musculoskeletal: Normal range of motion.   Skin:     General: Skin is warm and dry.      Capillary Refill: Capillary refill takes less than 2 seconds.   Neurological:      General: No focal deficit present.      Mental Status: She is alert.   Psychiatric:         Mood and Affect: Mood normal.         Behavior: Behavior normal.         ED Course        Procedures               EKG Interpretation:      Interpreted by Payam Patel MD  Time reviewed: Time obtained 1334 time interpreted 1340 sinus rhythm 93 bpm no acute ST-T wave changes.  Nonspecific T wave abnormality.  No definite ischemia or infarct.  Normal axis.  Normal intervals.            Critical Care time:  none               Results for orders placed or performed during the hospital encounter of 11/06/19 (from the past 24 hour(s))   CBC with platelets differential   Result Value Ref Range    WBC 7.5 4.0 - 11.0 10e9/L    RBC Count 4.43 3.8 - 5.2 10e12/L    Hemoglobin 14.3 11.7 - 15.7 g/dL    Hematocrit 43.2 35.0 - 47.0 %    MCV 98 78 - 100 fl    MCH 32.3 26.5 - 33.0 pg    MCHC 33.1 31.5 - 36.5 g/dL    RDW 11.9 10.0 - 15.0 %    Platelet Count 305 150 - 450 10e9/L    Diff Method Automated Method     % Neutrophils 62.5 %    % Lymphocytes 27.2 %    % Monocytes 7.2 %    % Eosinophils 2.0 %    % Basophils 0.8 %    % Immature Granulocytes 0.3 %    Nucleated RBCs 0 0 /100    Absolute Neutrophil 4.7 1.6 - 8.3 10e9/L    Absolute Lymphocytes 2.1 0.8 - 5.3 10e9/L     Absolute Monocytes 0.5 0.0 - 1.3 10e9/L    Absolute Eosinophils 0.2 0.0 - 0.7 10e9/L    Absolute Basophils 0.1 0.0 - 0.2 10e9/L    Abs Immature Granulocytes 0.0 0 - 0.4 10e9/L    Absolute Nucleated RBC 0.0    Comprehensive metabolic panel   Result Value Ref Range    Sodium 137 133 - 144 mmol/L    Potassium 4.0 3.4 - 5.3 mmol/L    Chloride 105 94 - 109 mmol/L    Carbon Dioxide 30 20 - 32 mmol/L    Anion Gap 2 (L) 3 - 14 mmol/L    Glucose 84 70 - 99 mg/dL    Urea Nitrogen 8 7 - 30 mg/dL    Creatinine 0.63 0.52 - 1.04 mg/dL    GFR Estimate >90 >60 mL/min/[1.73_m2]    GFR Estimate If Black >90 >60 mL/min/[1.73_m2]    Calcium 9.4 8.5 - 10.1 mg/dL    Bilirubin Total 0.4 0.2 - 1.3 mg/dL    Albumin 4.1 3.4 - 5.0 g/dL    Protein Total 7.9 6.8 - 8.8 g/dL    Alkaline Phosphatase 70 40 - 150 U/L    ALT 27 0 - 50 U/L    AST 21 0 - 45 U/L   Troponin I   Result Value Ref Range    Troponin I ES <0.015 0.000 - 0.045 ug/L   D dimer quantitative   Result Value Ref Range    D Dimer 0.3 0.0 - 0.50 ug/ml FEU   HCG qualitative pregnancy (blood)   Result Value Ref Range    HCG Qualitative Serum Negative NEG^Negative   XR Chest 2 Views    Narrative    CHEST TWO VIEWS  11/6/2019 4:15 PM     HISTORY:  Chest pain.    COMPARISON: None.      Impression    IMPRESSION: No acute cardiopulmonary disease.    DORCAS SUN MD   Troponin I   Result Value Ref Range    Troponin I ES <0.015 0.000 - 0.045 ug/L       Medications   nitroGLYcerin (NITROSTAT) sublingual tablet 0.4 mg (0.4 mg Sublingual Given 11/6/19 1527)   ondansetron (ZOFRAN) injection 4 mg (4 mg Intravenous Given 11/6/19 1530)   lactated ringers BOLUS 2,000 mL (0 mLs Intravenous Stopped 11/6/19 1721)   lidocaine (XYLOCAINE) 2 % 15 mL, alum & mag hydroxide-simethicone (MYLANTA ES/MAALOX  ES) 15 mL GI Cocktail (30 mLs Oral Given 11/6/19 8601)     No response to nitroglycerin x2.  Vague but I think that her discomfort improved somewhat with the use of the GI cocktail.    5:49 PM  Recheck and  review of all diagnostics performed including cardiac troponin x2 which are negative.  The patient remains extremely vague and evasive upon questioning with respect to her alcohol use.  She will not definitively tell me any meaningful number of amount that she uses and how often, thinks the last time she drank alcohol was a few days ago can be more specific than that or will not be.  She would not tell me the last time she used methamphetamine, could have been a day or 2 ago.  Her sister seems to think that it was recent within the last 24 hours.  As noted in the history initially they are attempting to get her into some type of the treatment program, their main concern be for methamphetamine.  They are very frustrated with the process thus far.  Information was shared with them from their nurse with respect to social work at our facility here.  6:10 PM  I spoke with Dean for the DEC regarding potential options for this patient with respect to her chemical dependency.  He kindly put together a list of chemical dependency treatment options in the [facility] including the [facility]'s residence, [facility] in [place], [facility] pathways counseling services on [facility] in [place], and [facility] also in [place].  Unfortunately before I could share this list with the patient and her sister the eloped from the emergency department.  Fortunately we have reviewed her diagnostic evaluation prior to them leaving unannounced.  I had answered their questions.    Assessments & Plan (with Medical Decision Making)   44-year-old female past medical history reviewed as above noting ischemic chest pain in the context of stimulant use in the past in the form of cocaine while in [place], presents to the emergency department today with concerns of 2 to 3 weeks of vague low energy, poor appetite, fatigue, and now more acutely beginning around 10:00 with no exertion left-sided dull  "achy chest pain that was reminiscent of when she had her \"MI\" in 2016 as discussed in the HPI.  On exam the patient is alert, no acute distress but by her own admission anxious, not very forthcoming with information and frankly difficult to obtain accurate history and information from given her poor level of cooperation with history taking.  She certainly has had some significant past history with cardiac ischemia in the context of cocaine use however her most recent echo as referenced in my HPI is essentially normal.  It appears on direct questioning that she uses methamphetamine with some frequency, I suspect in the last 24 hours, and is vague about alcohol use but I suspect that is also used on a frequent basis as well.  Her sister was of limited help in obtaining any additional information even though she was in the room with the patient the entire time.  Her EKG did not reveal any acute ischemia or infarct.  She had no response to nitroglycerin and some response, vague, improvement with the use of a GI cocktail.  Her cardiac troponins are negative x2, and at this point I do not suspect that she has any acute coronary syndrome and process.  I am concerned that her blood pressure is labile and elevated.  We discussed possibly treating this however difficult to initiate treatment appropriately in the context of her ongoing stimulant use.  They were anxious for options to get the patient in for some treatment but unfortunately left prior to the information that I was able to provide with respect to this component of her presentation.  There is certainly welcome to return at any time however they eloped from the emergency department today.    Disclaimer: This note consists of symbols derived from keyboarding, dictation and/or voice recognition software. As a result, there may be errors in the script that have gone undetected. Please consider this when interpreting information found in this chart.      I have " reviewed the nursing notes.    I have reviewed the findings, diagnosis, plan and need for follow up with the patient.       New Prescriptions    No medications on file       Final diagnoses:   Acute chest pain - Noncardiac   Elevated blood pressure reading without diagnosis of hypertension   Anxiety   Methamphetamine use (H)   Alcohol use       11/6/2019   [facility] EMERGENCY DEPARTMENT     Payam Patel MD  11/06/19 4475

## 2019-11-07 ENCOUNTER — HOSPITAL ENCOUNTER (INPATIENT)
Facility: CLINIC | Age: 44
LOS: 6 days | Discharge: PSYCHIATRIC OR SUBSTANCE USE FACILITY | End: 2019-11-13
Attending: EMERGENCY MEDICINE | Admitting: PSYCHIATRY & NEUROLOGY
Payer: COMMERCIAL

## 2019-11-07 ENCOUNTER — TELEPHONE (OUTPATIENT)
Dept: BEHAVIORAL HEALTH | Facility: CLINIC | Age: 44
End: 2019-11-07

## 2019-11-07 DIAGNOSIS — F19.10 POLYSUBSTANCE ABUSE (H): ICD-10-CM

## 2019-11-07 DIAGNOSIS — F41.1 GENERALIZED ANXIETY DISORDER: Primary | ICD-10-CM

## 2019-11-07 PROBLEM — F10.10 ALCOHOL ABUSE: Status: ACTIVE | Noted: 2019-11-07

## 2019-11-07 LAB
ALBUMIN SERPL-MCNC: 3.9 G/DL (ref 3.4–5)
ALCOHOL BREATH TEST: 0 (ref 0–0.01)
ALP SERPL-CCNC: 66 U/L (ref 40–150)
ALT SERPL W P-5'-P-CCNC: 23 U/L (ref 0–50)
AMPHETAMINES UR QL SCN: POSITIVE
ANION GAP SERPL CALCULATED.3IONS-SCNC: 6 MMOL/L (ref 3–14)
AST SERPL W P-5'-P-CCNC: 26 U/L (ref 0–45)
BARBITURATES UR QL: NEGATIVE
BENZODIAZ UR QL: NEGATIVE
BILIRUB SERPL-MCNC: 0.4 MG/DL (ref 0.2–1.3)
BUN SERPL-MCNC: 7 MG/DL (ref 7–30)
CALCIUM SERPL-MCNC: 9.1 MG/DL (ref 8.5–10.1)
CANNABINOIDS UR QL SCN: NEGATIVE
CHLORIDE SERPL-SCNC: 107 MMOL/L (ref 94–109)
CO2 SERPL-SCNC: 27 MMOL/L (ref 20–32)
COCAINE UR QL: NEGATIVE
CREAT SERPL-MCNC: 0.56 MG/DL (ref 0.52–1.04)
ETHANOL SERPL-MCNC: <0.01 G/DL
ETHANOL UR QL SCN: NEGATIVE
GFR SERPL CREATININE-BSD FRML MDRD: >90 ML/MIN/{1.73_M2}
GLUCOSE SERPL-MCNC: 89 MG/DL (ref 70–99)
OPIATES UR QL SCN: NEGATIVE
POTASSIUM SERPL-SCNC: 4 MMOL/L (ref 3.4–5.3)
PROT SERPL-MCNC: 7.2 G/DL (ref 6.8–8.8)
SODIUM SERPL-SCNC: 140 MMOL/L (ref 133–144)

## 2019-11-07 PROCEDURE — 12800008 ZZH R&B CD ADULT

## 2019-11-07 PROCEDURE — 12800000 ZZH R&B CD/MH ADULT

## 2019-11-07 PROCEDURE — 80307 DRUG TEST PRSMV CHEM ANLYZR: CPT | Performed by: EMERGENCY MEDICINE

## 2019-11-07 PROCEDURE — 80320 DRUG SCREEN QUANTALCOHOLS: CPT | Performed by: EMERGENCY MEDICINE

## 2019-11-07 PROCEDURE — 99285 EMERGENCY DEPT VISIT HI MDM: CPT | Mod: 25 | Performed by: EMERGENCY MEDICINE

## 2019-11-07 PROCEDURE — 99284 EMERGENCY DEPT VISIT MOD MDM: CPT | Mod: Z6 | Performed by: EMERGENCY MEDICINE

## 2019-11-07 PROCEDURE — 25000132 ZZH RX MED GY IP 250 OP 250 PS 637: Performed by: NURSE PRACTITIONER

## 2019-11-07 PROCEDURE — 80053 COMPREHEN METABOLIC PANEL: CPT | Performed by: EMERGENCY MEDICINE

## 2019-11-07 PROCEDURE — 82075 ASSAY OF BREATH ETHANOL: CPT | Performed by: EMERGENCY MEDICINE

## 2019-11-07 RX ORDER — LANOLIN ALCOHOL/MO/W.PET/CERES
100 CREAM (GRAM) TOPICAL DAILY
Status: DISCONTINUED | OUTPATIENT
Start: 2019-11-07 | End: 2019-11-13 | Stop reason: HOSPADM

## 2019-11-07 RX ORDER — DIAZEPAM 5 MG
5-20 TABLET ORAL EVERY 30 MIN PRN
Status: DISCONTINUED | OUTPATIENT
Start: 2019-11-07 | End: 2019-11-13 | Stop reason: HOSPADM

## 2019-11-07 RX ORDER — ALUMINA, MAGNESIA, AND SIMETHICONE 2400; 2400; 240 MG/30ML; MG/30ML; MG/30ML
30 SUSPENSION ORAL EVERY 4 HOURS PRN
Status: DISCONTINUED | OUTPATIENT
Start: 2019-11-07 | End: 2019-11-13 | Stop reason: HOSPADM

## 2019-11-07 RX ORDER — OLANZAPINE 5 MG/1
10 TABLET ORAL
Status: DISCONTINUED | OUTPATIENT
Start: 2019-11-07 | End: 2019-11-11

## 2019-11-07 RX ORDER — ONDANSETRON 4 MG/1
4 TABLET, ORALLY DISINTEGRATING ORAL EVERY 6 HOURS PRN
Status: DISCONTINUED | OUTPATIENT
Start: 2019-11-07 | End: 2019-11-13 | Stop reason: HOSPADM

## 2019-11-07 RX ORDER — BISACODYL 10 MG
10 SUPPOSITORY, RECTAL RECTAL DAILY PRN
Status: DISCONTINUED | OUTPATIENT
Start: 2019-11-07 | End: 2019-11-13 | Stop reason: HOSPADM

## 2019-11-07 RX ORDER — FOLIC ACID 1 MG/1
1 TABLET ORAL DAILY
Status: DISCONTINUED | OUTPATIENT
Start: 2019-11-07 | End: 2019-11-13 | Stop reason: HOSPADM

## 2019-11-07 RX ORDER — MULTIPLE VITAMINS W/ MINERALS TAB 9MG-400MCG
1 TAB ORAL DAILY
Status: DISCONTINUED | OUTPATIENT
Start: 2019-11-07 | End: 2019-11-13 | Stop reason: HOSPADM

## 2019-11-07 RX ORDER — OLANZAPINE 10 MG/2ML
10 INJECTION, POWDER, FOR SOLUTION INTRAMUSCULAR
Status: DISCONTINUED | OUTPATIENT
Start: 2019-11-07 | End: 2019-11-11

## 2019-11-07 RX ORDER — ATENOLOL 50 MG/1
50 TABLET ORAL DAILY PRN
Status: DISCONTINUED | OUTPATIENT
Start: 2019-11-07 | End: 2019-11-13 | Stop reason: HOSPADM

## 2019-11-07 RX ORDER — LOPERAMIDE HCL 2 MG
2 CAPSULE ORAL 4 TIMES DAILY PRN
Status: DISCONTINUED | OUTPATIENT
Start: 2019-11-07 | End: 2019-11-13 | Stop reason: HOSPADM

## 2019-11-07 RX ORDER — IBUPROFEN 600 MG/1
600 TABLET, FILM COATED ORAL EVERY 6 HOURS PRN
Status: DISCONTINUED | OUTPATIENT
Start: 2019-11-07 | End: 2019-11-13 | Stop reason: HOSPADM

## 2019-11-07 RX ORDER — TRAZODONE HYDROCHLORIDE 50 MG/1
50 TABLET, FILM COATED ORAL
Status: DISCONTINUED | OUTPATIENT
Start: 2019-11-07 | End: 2019-11-13 | Stop reason: HOSPADM

## 2019-11-07 RX ORDER — ACETAMINOPHEN 325 MG/1
650 TABLET ORAL EVERY 4 HOURS PRN
Status: DISCONTINUED | OUTPATIENT
Start: 2019-11-07 | End: 2019-11-13 | Stop reason: HOSPADM

## 2019-11-07 RX ORDER — HYDROXYZINE HYDROCHLORIDE 25 MG/1
25 TABLET, FILM COATED ORAL EVERY 4 HOURS PRN
Status: DISCONTINUED | OUTPATIENT
Start: 2019-11-07 | End: 2019-11-08

## 2019-11-07 RX ADMIN — MULTIPLE VITAMINS W/ MINERALS TAB 1 TABLET: TAB at 20:45

## 2019-11-07 RX ADMIN — IBUPROFEN 600 MG: 600 TABLET, FILM COATED ORAL at 20:45

## 2019-11-07 RX ADMIN — DIAZEPAM 10 MG: 5 TABLET ORAL at 20:45

## 2019-11-07 RX ADMIN — Medication 100 MG: at 20:45

## 2019-11-07 RX ADMIN — FOLIC ACID 1 MG: 1 TABLET ORAL at 20:45

## 2019-11-07 RX ADMIN — TRAZODONE HYDROCHLORIDE 50 MG: 50 TABLET ORAL at 20:45

## 2019-11-07 ASSESSMENT — ACTIVITIES OF DAILY LIVING (ADL)
RETIRED_COMMUNICATION: 0-->UNDERSTANDS/COMMUNICATES WITHOUT DIFFICULTY
RETIRED_EATING: 0-->INDEPENDENT
COGNITION: 0 - NO COGNITION ISSUES REPORTED
FALL_HISTORY_WITHIN_LAST_SIX_MONTHS: NO
TOILETING: 0-->INDEPENDENT
BATHING: 0-->INDEPENDENT
SWALLOWING: 0-->SWALLOWS FOODS/LIQUIDS WITHOUT DIFFICULTY
DRESS: 0-->INDEPENDENT

## 2019-11-07 ASSESSMENT — ENCOUNTER SYMPTOMS
CONFUSION: 0
FEVER: 0
COLOR CHANGE: 0
ABDOMINAL PAIN: 0
HEADACHES: 0
TREMORS: 1
EYE REDNESS: 0
SHORTNESS OF BREATH: 0
DIFFICULTY URINATING: 0
ARTHRALGIAS: 0
NECK STIFFNESS: 0

## 2019-11-07 NOTE — ED NOTES
Pt has hesitant speech.  Seeking detox from etoh and meth  Last use was Tuesday.    Hx of seizures a few years ago but unsure if they were due to benzos or heroin or etoh.    .Video Observation initiated, patient informed.     Marilou Rocha RN    .Patient has been informed they can not go outside to smoke and that they have a choice of a patch or gum    Has been to our detox before

## 2019-11-07 NOTE — DISCHARGE INSTRUCTIONS
Patient and her sister left the emergency department (eloped) prior to final discussion with the patient as I was awaiting information from the DEC  as I had requested and informed the patient that I would be doing.  Prior to you leaving the emergency department unannounced we reviewed and discussed your diagnostic evaluation here.  I have obtained a list of chemical dependency treatment options in the [facility] area for you.  You need to stop using methamphetamine and alcohol.  Return to the emergency department with further concerns.

## 2019-11-07 NOTE — ED NOTES
ED to Behavioral Floor Handoff    SITUATION  Serenity Jolly is a 44 year old female who speaks English and lives in a home unknown The patient arrived in the ED by private car from home with a complaint of Withdrawal (seeking detox from etoh - does use meth and alcohol)  .The patient's current symptoms started/worsened 2 day(s) ago and during this time the symptoms have remained the same.   In the ED, pt was diagnosed with   Final diagnoses:   Polysubstance abuse (H)        Initial vitals were: BP: 119/82  Pulse: 101  Temp: 97.7  F (36.5  C)  Resp: 18  Weight: 63.5 kg (140 lb)  SpO2: 98 %   --------  Is the patient diabetic? No   If yes, last blood glucose? --     If yes, was this treated in the ED? --  --------  Is the patient inebriated (ETOH) No or Impaired on other substances? Yes  MSSA done? N/A  Last MSSA score: --    Were withdrawal symptoms treated? No  Does the patient have a seizure history? Yes. If yes, date of most recent seizure--  --------  Is the patient patient experiencing suicidal ideation? denies current or recent suicidal ideation     Homicidal ideation? denies current or recent homicidal ideation or behaviors.    Self-injurious behavior/urges? denies current or recent self injurious behavior or ideation.  ------  Was pt aggressive in the ED No  Was a code called No  Is the pt now cooperative? Yes  -------  Meds given in ED: Medications - No data to display   Family present during ED course? No  Family currently present? No    BACKGROUND  Does the patient have a cognitive impairment or developmental disability? No  Allergies:   Allergies   Allergen Reactions     Sulfa Drugs Hives   .   Social demographics are   Social History     Socioeconomic History     Marital status:      Spouse name: None     Number of children: None     Years of education: None     Highest education level: None   Occupational History     None   Social Needs     Financial resource strain: None     Food  insecurity:     Worry: None     Inability: None     Transportation needs:     Medical: None     Non-medical: None   Tobacco Use     Smoking status: Current Every Day Smoker     Packs/day: 0.00     Last attempt to quit: 2019     Years since quittin.8     Smokeless tobacco: Never Used   Substance and Sexual Activity     Alcohol use: No     Comment: socially     Drug use: Yes     Types: IV, Methamphetamines     Sexual activity: Yes     Partners: Male     Birth control/protection: Pill   Lifestyle     Physical activity:     Days per week: None     Minutes per session: None     Stress: None   Relationships     Social connections:     Talks on phone: None     Gets together: None     Attends Hoahaoism service: None     Active member of club or organization: None     Attends meetings of clubs or organizations: None     Relationship status: None     Intimate partner violence:     Fear of current or ex partner: None     Emotionally abused: None     Physically abused: None     Forced sexual activity: None   Other Topics Concern     Parent/sibling w/ CABG, MI or angioplasty before 65F 55M? Not Asked   Social History Narrative    ** Merged History Encounter **             ASSESSMENT  Labs results   Labs Ordered and Resulted from Time of ED Arrival Up to the Time of Departure from the ED   DRUG ABUSE SCREEN 6 CHEM DEP URINE ([facility]) - Abnormal; Notable for the following components:       Result Value    Amphetamine Qual Urine Positive (*)     All other components within normal limits   ALCOHOL ETHYL      Imaging Studies:   Recent Results (from the past 24 hour(s))   XR Chest 2 Views    Narrative    CHEST TWO VIEWS  2019 4:15 PM     HISTORY:  Chest pain.    COMPARISON: None.      Impression    IMPRESSION: No acute cardiopulmonary disease.    DORCAS SUN MD      Most recent vital signs /82   Pulse 101   Temp 97.7  F (36.5  C) (Oral)   Resp 18   Wt 63.5 kg (140 lb)   SpO2 98%   BMI 23.30 kg/m     Abnormal  labs/tests/findings requiring intervention:---   Pain control: pt had none  Nausea control: pt had none    RECOMMENDATION  Are any infection precautions needed (MRSA, VRE, etc.)? No If yes, what infection? --  ---  Does the patient have mobility issues? independently. If yes, what device does the pt use? ---  ---  Is patient on 72 hour hold or commitment? No If on 72 hour hold, have hold and rights been given to patient? N/A  Are admitting orders written if after 10 p.m. ?N/A  Tasks needing to be completed:---     Otto Holden RN   [facility]-- [phone] [place] [facility]   [phone] [facility]

## 2019-11-07 NOTE — TELEPHONE ENCOUNTER
S: Dr Phillips gave clinical saying pt presents in er seeking detox.   B: etoh, benzo and meth. She reports she has been using an 8 ball of meth every 2 days for the past few months. She has been taking Ativan, not rx'ed, between 1-4 mg per day for many weeks. Last use a few days ago. She reports she has been drinking 1/2 liter of vodka per day for past few months. Last drink was 1.5 days ago. Utox pos for amphet's. No chronic med prob's.   A: med cleared, denies SI, coop, vol, walking indep  R: paged Lazarus at 2:58 pm; #3awest/straight cd/ [facility] accepted for herself                        Unit informed at 3:06 pm. Per Roque RN's are in report but he will leave msg for them to call back;   Ed informed at 3:08 pm             mildred

## 2019-11-07 NOTE — ED NOTES
In to discharge pt, pt and sister no longer in room, preumably left ED, without written discharge info. Pt had been provided resources for chem dep and detox previously.

## 2019-11-07 NOTE — ED PROVIDER NOTES
[facility] EMERGENCY DEPARTMENT ([place])     2019    History     Chief Complaint   Patient presents with     Withdrawal     seeking detox from etoh - does use meth and alcohol     HPI  Serenity Jolly is a 44 year old female with history of heroin abuse, cocaine abuse, ACS, chronic systolic CHF, and MI who presents to the ED seeking detox from alcohol, benzodiazepines, and methamphetamine.  She states she drinks 1/2 L of vodka daily, and her last drink was 1.5 days ago.  She states that she does not use benzodiazepines daily and last took of benzodiazepine at this morning but is unsure specifically of which benzodiazepine she took.  She also reports that she uses 3 to 3.5 g of IV methamphetamine daily and last use was 1.5 days ago.  She reports that she had a substance related seizure in 2016 but is unsure which substance.  When asked about previous history of DTs, patient states that she has previous history of auditory hallucinations.  She otherwise denies recent opiate use.  She states that she did call ahead for a detox bed and has a bed on hold.  She also complains of tremulousness.    PAST MEDICAL HISTORY  Past Medical History:   Diagnosis Date     Cerebral infarction (H)     2 heart attacks 2016     Myocardial infarction (H)      Substance abuse (H)      PAST SURGICAL HISTORY  Past Surgical History:   Procedure Laterality Date     BREAST SURGERY      breast augmentation     COSMETIC SURGERY       ENT SURGERY       GYN SURGERY      drained R ovary     FAMILY HISTORY  History reviewed. No pertinent family history.  SOCIAL HISTORY  Social History     Tobacco Use     Smoking status: Current Every Day Smoker     Packs/day: 0.00     Last attempt to quit: 2019     Years since quittin.8     Smokeless tobacco: Never Used   Substance Use Topics     Alcohol use: No     Comment: socially     MEDICATIONS  No current facility-administered medications for this encounter.      Current  Outpatient Medications   Medication     hydrOXYzine (ATARAX) 10 MG tablet     nitroGLYcerin (NITROSTAT) 0.4 MG sublingual tablet     ALLERGIES  Allergies   Allergen Reactions     Sulfa Drugs Hives       I have reviewed the Medications, Allergies, Past Medical and Surgical History, and Social History in the Epic system.    Review of Systems   Constitutional: Negative for fever.   HENT: Negative for congestion.    Eyes: Negative for redness.   Respiratory: Negative for shortness of breath.    Cardiovascular: Negative for chest pain.   Gastrointestinal: Negative for abdominal pain.   Genitourinary: Negative for difficulty urinating.   Musculoskeletal: Negative for arthralgias and neck stiffness.   Skin: Negative for color change.   Neurological: Positive for tremors. Negative for headaches. Weakness: tremulousness.   Psychiatric/Behavioral: Negative for confusion.   All other systems reviewed and are negative.      Physical Exam   BP: 119/82  Pulse: 101  Temp: 97.7  F (36.5  C)  Resp: 18  Weight: 63.5 kg (140 lb)  SpO2: 98 %      Physical Exam  Constitutional:       General: She is not in acute distress.     Appearance: She is not diaphoretic.   HENT:      Head: Atraumatic.      Mouth/Throat:      Pharynx: No oropharyngeal exudate.   Eyes:      General: No scleral icterus.     Pupils: Pupils are equal, round, and reactive to light.   Cardiovascular:      Heart sounds: Normal heart sounds.   Pulmonary:      Effort: No respiratory distress.      Breath sounds: Normal breath sounds.   Abdominal:      General: Bowel sounds are normal.      Palpations: Abdomen is soft.      Tenderness: There is no tenderness.   Musculoskeletal:         General: No tenderness.   Skin:     General: Skin is warm.      Findings: No rash.         ED Course        Procedures   11:57 AM  The patient was seen and examined by Dr. Phillips in [place] [address].            Results for orders placed or performed during the hospital encounter of 11/07/19   Drug  abuse screen 6 urine (tox)     Status: Abnormal   Result Value Ref Range    Amphetamine Qual Urine Positive (A) NEG^Negative    Barbiturates Qual Urine Negative NEG^Negative    Benzodiazepine Qual Urine Negative NEG^Negative    Cannabinoids Qual Urine Negative NEG^Negative    Cocaine Qual Urine Negative NEG^Negative    Ethanol Qual Urine Negative NEG^Negative    Opiates Qualitative Urine Negative NEG^Negative   Alcohol level blood     Status: None   Result Value Ref Range    Ethanol g/dL <0.01 <0.01 g/dL            Labs Ordered and Resulted from Time of ED Arrival Up to the Time of Departure from the ED   DRUG ABUSE SCREEN 6 CHEM DEP URINE ([facility]) - Abnormal; Notable for the following components:       Result Value    Amphetamine Qual Urine Positive (*)     All other components within normal limits   ALCOHOL ETHYL            Assessments & Plan (with Medical Decision Making)   44-year-old female who presents requesting detox from alcohol and benzodiazepines.  Patient has been using half liter of alcohol per day up until the day and a half ago.  She is complaining of mild tremulousness.  Laboratories obtained yesterday were grossly normal with exception of slightly elevated liver enzyme.  Urine toxicology today is positive for methamphetamine.  The case was discussed at length with chemical dependency intake and the patient will be admitted for further evaluation and management to detox.    I have reviewed the nursing notes.    I have reviewed the findings, diagnosis, plan and need for follow up with the patient.    New Prescriptions    No medications on file       Final diagnoses:   Polysubstance abuse (H)     Ruben ZAMORA, am serving as a trained medical scribe to document services personally performed by Mehul Phillips MD, based on the provider's statements to me.      Mehul ZAMORA MD, was physically present and have reviewed and verified the accuracy of this note documented by Ruben Dangelo.     11/7/2019   [facility],  [place], EMERGENCY DEPARTMENT     Alvaro Phillips MD  11/07/19 [id number]

## 2019-11-07 NOTE — PHARMACY-ADMISSION MEDICATION HISTORY
Admission Medication History Completed by Pharmacy    Sources used to verify prior to admission medications:   - Patient interview  - Prescription directions and fill records from    [place], [place] - [address] [place] [address]    Pertinent Changes Made to PTA Medication List:  Added: none  Removed:   1) hydroxyzine 10mg PO PRN (prescribed 10-, never filled)  2) nitroglycerin 0.4mg SL tabs (old Rx from )   Changed: none    Additional Information:   - Patient denies being prescribed any medications or taking any over-the-counter (OTC) products such as vitamins, supplements, sleep aids, etc.      Prior to Admission Medication List:  None     Time spent: 5 minutes  -----------  Ju Prado, Pharm.D., BCPP  Behavioral Health Pharmacist  [facility])  Ascom: *[phone] or [phone] (1pm to 9pm daily)

## 2019-11-08 LAB
BASOPHILS # BLD AUTO: 0 10E9/L (ref 0–0.2)
BASOPHILS NFR BLD AUTO: 0.6 %
CHOLEST SERPL-MCNC: 199 MG/DL
DIFFERENTIAL METHOD BLD: NORMAL
EOSINOPHIL # BLD AUTO: 0.3 10E9/L (ref 0–0.7)
EOSINOPHIL NFR BLD AUTO: 5.7 %
ERYTHROCYTE [DISTWIDTH] IN BLOOD BY AUTOMATED COUNT: 12.5 % (ref 10–15)
GGT SERPL-CCNC: 11 U/L (ref 0–40)
HCT VFR BLD AUTO: 41.3 % (ref 35–47)
HDLC SERPL-MCNC: 70 MG/DL
HGB BLD-MCNC: 13.5 G/DL (ref 11.7–15.7)
IMM GRANULOCYTES # BLD: 0 10E9/L (ref 0–0.4)
IMM GRANULOCYTES NFR BLD: 0.2 %
LDLC SERPL CALC-MCNC: 112 MG/DL
LYMPHOCYTES # BLD AUTO: 2.3 10E9/L (ref 0.8–5.3)
LYMPHOCYTES NFR BLD AUTO: 44.4 %
MCH RBC QN AUTO: 32.5 PG (ref 26.5–33)
MCHC RBC AUTO-ENTMCNC: 32.7 G/DL (ref 31.5–36.5)
MCV RBC AUTO: 99 FL (ref 78–100)
MONOCYTES # BLD AUTO: 0.5 10E9/L (ref 0–1.3)
MONOCYTES NFR BLD AUTO: 8.8 %
NEUTROPHILS # BLD AUTO: 2.1 10E9/L (ref 1.6–8.3)
NEUTROPHILS NFR BLD AUTO: 40.3 %
NONHDLC SERPL-MCNC: 129 MG/DL
NRBC # BLD AUTO: 0 10*3/UL
NRBC BLD AUTO-RTO: 0 /100
PLATELET # BLD AUTO: 249 10E9/L (ref 150–450)
RBC # BLD AUTO: 4.16 10E12/L (ref 3.8–5.2)
TRIGL SERPL-MCNC: 87 MG/DL
TSH SERPL DL<=0.005 MIU/L-ACNC: 0.53 MU/L (ref 0.4–4)
VIT B12 SERPL-MCNC: 645 PG/ML (ref 193–986)
WBC # BLD AUTO: 5.1 10E9/L (ref 4–11)

## 2019-11-08 PROCEDURE — 99207 ZZC DOWN CODE DUE TO INITIAL EXAM: CPT | Performed by: PSYCHIATRY & NEUROLOGY

## 2019-11-08 PROCEDURE — 99222 1ST HOSP IP/OBS MODERATE 55: CPT | Performed by: PHYSICIAN ASSISTANT

## 2019-11-08 PROCEDURE — 80061 LIPID PANEL: CPT | Performed by: PSYCHIATRY & NEUROLOGY

## 2019-11-08 PROCEDURE — 36415 COLL VENOUS BLD VENIPUNCTURE: CPT | Performed by: PSYCHIATRY & NEUROLOGY

## 2019-11-08 PROCEDURE — HZ2ZZZZ DETOXIFICATION SERVICES FOR SUBSTANCE ABUSE TREATMENT: ICD-10-PCS | Performed by: PSYCHIATRY & NEUROLOGY

## 2019-11-08 PROCEDURE — 84443 ASSAY THYROID STIM HORMONE: CPT | Performed by: PSYCHIATRY & NEUROLOGY

## 2019-11-08 PROCEDURE — 12800000 ZZH R&B CD/MH ADULT

## 2019-11-08 PROCEDURE — 99207 ZZC CONSULT E&M CHANGED TO INITIAL LEVEL: CPT | Performed by: PHYSICIAN ASSISTANT

## 2019-11-08 PROCEDURE — 82977 ASSAY OF GGT: CPT | Performed by: PSYCHIATRY & NEUROLOGY

## 2019-11-08 PROCEDURE — 25000132 ZZH RX MED GY IP 250 OP 250 PS 637: Performed by: NURSE PRACTITIONER

## 2019-11-08 PROCEDURE — 25000132 ZZH RX MED GY IP 250 OP 250 PS 637: Performed by: PSYCHIATRY & NEUROLOGY

## 2019-11-08 PROCEDURE — 82607 VITAMIN B-12: CPT | Performed by: PSYCHIATRY & NEUROLOGY

## 2019-11-08 PROCEDURE — 99221 1ST HOSP IP/OBS SF/LOW 40: CPT | Mod: AI | Performed by: PSYCHIATRY & NEUROLOGY

## 2019-11-08 PROCEDURE — 85025 COMPLETE CBC W/AUTO DIFF WBC: CPT | Performed by: PSYCHIATRY & NEUROLOGY

## 2019-11-08 RX ADMIN — FOLIC ACID 1 MG: 1 TABLET ORAL at 09:04

## 2019-11-08 RX ADMIN — Medication 100 MG: at 09:04

## 2019-11-08 RX ADMIN — MULTIPLE VITAMINS W/ MINERALS TAB 1 TABLET: TAB at 09:04

## 2019-11-08 RX ADMIN — SERTRALINE HYDROCHLORIDE 50 MG: 50 TABLET ORAL at 13:52

## 2019-11-08 RX ADMIN — OLANZAPINE 10 MG: 5 TABLET, FILM COATED ORAL at 13:52

## 2019-11-08 ASSESSMENT — ACTIVITIES OF DAILY LIVING (ADL)
ORAL_HYGIENE: INDEPENDENT
LAUNDRY: WITH SUPERVISION
DRESS: INDEPENDENT
HYGIENE/GROOMING: INDEPENDENT
DRESS: INDEPENDENT
ORAL_HYGIENE: INDEPENDENT
HYGIENE/GROOMING: INDEPENDENT

## 2019-11-08 NOTE — PROGRESS NOTES
Pt spoke with her sister Suze, (GAIL in chart) about discharging to her care. Suze declined to  pt. Pt is now agreeable to stay.
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
## 2019-11-08 NOTE — PROGRESS NOTES
"Writer met with pt to discuss aftercare plans. Pt reports she is not sure what she is doing here, states \"i'm fucking crazy\". Pt reports she thinks \"this whole thing is a circus, i'm not even sure the other people here are actually patients or part of the show.\" Writer attempted to clarify statements and pt stated \"I don't know what's going on.\" Writer asked pt if she has slept recently and pt states \"I've been sleeping a lot.\" It appears as though pt is going through the aftereffects of methamphetamine use. Pt did state she does not know if she will go to treatment, reports she had a Rule 25 completed at [facility] \"a few weeks ago.\" Pt stated that \"maybe I can go to [facility], they need to see i've been doing heroin though.\" Pt reports she has used heroin \"from time to time\" in the last few months.  Pt signed GAIL for [facility] and writer faxed copy to [facility] requesting copy of Rule 25 assessment.  CM to follow-up with pt on 11/11 if she is still hospitalized to determine plan and assist with referral as needed.     Update: Original Rule 25 received from [facility], copy placed in writers file cabinet on right side of cabinet for CM to follow-up on 11/11.  "

## 2019-11-08 NOTE — CONSULTS
[facility] [place]  Consult Note - Hospitalist Service     Date of Admission:  11/7/2019  Consult Requested by: Ethel Palmer CNP  Reason for Consult: Medical co-management    Assessment & Plan   Serenity Jolly is a 44 year old female with a history of polysubstance abuse, anxiety, MI (2016), and coronary vasospasms, who was admitted to station 3A on 11/7/19 seeking detoxification from methamphetamine, benzodiazepines, and alcohol.    Polysubstance abuse: Utox in the ED + for amphetamines, ABBY 0.0. Using IV methamphetamine 3-3.5 grams daily (last used 11/5), alcohol 1/2 liter daily (last use 11/5), and an unknown quantity of benzodiazepines on occasion (last used AM of 11/7). Had a prior seizure in the setting of alcohol and heroin intoxication. Injects methamphetamine into bilateral hands/wrists, no e/o infection on exam.    - Continue on MSSA protocol with benzodiazepines as indicated   - Seizure precautions   - Management per Psychiatry   - Agree with MVI, folic acid, and thiamine supplements   - Notify medicine for SBP >180 or DBP >110    Hx of MI (4/2016)  Chest pain, resolved  Prior MI felt to be cocaine-induced, had an echo with EF of 35% per report but these records are unavailable to review at this time. Repeat echo later in 4/2016 with LVEF 55-60% and no cardiac dysfunction. Carries a diagnosis of chronic systolic CHF in her chart but no symptoms, normal echo, and not on any cardiac medications - question if this is an erroneous diagnosis carried forward since her OSH abnormal echo. Presented to ED 11/6 with chest pain, EKG and trop negative, now resolved. Has intermittent chest pain, previously felt to be due to cocaine-induced coronary vasospasms.   - Continue to monitor   - Notify Medicine for any chest pain, dyspnea, or other concerning cardiac symptoms    Currently the patient is medically stable and Medicine will sign off. Please do not hesitate to contact if  "new questions or concerns arise.       Lala Gonzalez PA-C  [facility], [place]  Hospitalist Service  Pager: [phone]      ______________________________________________________________________    Chief Complaint   \"I'm very tired\"    History is obtained from the patient    History of Present Illness   Serenity Jolly is a 44 year old female with a history of polysubstance abuse, anxiety, MI (2016), and coronary vasospasms, who was admitted to station 3A on 11/7/19 seeking detoxification from methamphetamine, benzodiazepines, and alcohol. She has been drinking 1/2 liter of vodka daily, last drink on 11/5. Also using 3 to 3.5 grams of IV methamphetamine daily, last used on 11/5. She injects into her bilateral hands and wrists. She endorses a prior history of injection site abscess, does not provide further details as to where or when this occurred. Has no concerns regarding her injection sites at this time and denies fever/chills, redness, swelling, or drainage at the sites. Denies a history of endocarditis. Also using benzodiazepines on occasion, not daily, and last used yesterday morning. She does not know how much she takes or which medication she has been taking, took \"a few pills\" yesterday. She reports a history of a seizure in 2016 that occurring while she was intoxicated from alcohol and opiates. She denies having seizures during periods of withdrawal. No history of DTs.     Currently she is feeling tired and \"just wants to sleep.\" She denies having any medical concerns at this time. She was evaluated in the ED on 11/6 for chest pain and reports this has resolved. No dyspnea, palpitations, orthopnea, leg swelling. She endorses a history of MI in 2016 that was due to using cocaine. She received care in [place] for this. Had another admission at a local OSH here in 4/2016 for chest pain that was felt to likely be due to cocaine-induced vasospasms but not MI. Per chart " "review, there are reports of an echocardiogram that had an EF of 35% in [place], repeat echocardiogram in 2016 at [facility] had normal LVEF of 55-60% with no evidence of cardiac dysfunction. The reports of her [place] echo are not available in care everywhere. She carries a diagnosis of chronic systolic CHF in her chart but based on her last echo in 2016 and her lack of symptoms, this seems to likely be an erroneous diagnosis that has been carried forward based on her abnormal echocardiogram in [place]. She does not recall carrying a diagnosis of heart failure and tells me she does not take any \"heart medications,\" aside from PRN nitroglycerin for chest pain. Does not follow with Cardiology.       Review of Systems   The 10 point Review of Systems is negative other than noted in the HPI or here.     Past Medical History    I have reviewed this patient's medical history and updated it with pertinent information if needed.   Past Medical History:   Diagnosis Date     Cerebral infarction (H)     2 heart attacks 2016     Myocardial infarction (H)      Substance abuse (H)        Past Surgical History   I have reviewed this patient's surgical history and updated it with pertinent information if needed.  Past Surgical History:   Procedure Laterality Date     BREAST SURGERY      breast augmentation     COSMETIC SURGERY       ENT SURGERY       GYN SURGERY      drained R ovary       Social History   I have reviewed this patient's social history and updated it with pertinent information if needed.  Social History     Tobacco Use     Smoking status: Current Every Day Smoker     Packs/day: 0.00     Last attempt to quit: 2019     Years since quittin.8     Smokeless tobacco: Never Used   Substance Use Topics     Alcohol use: No     Comment: socially     Drug use: Yes     Types: IV, Methamphetamines       Family History   I have reviewed this patient's family history and updated it with pertinent information if needed. "   History reviewed. No pertinent family history.    Medications   Current Facility-Administered Medications   Medication     acetaminophen (TYLENOL) tablet 650 mg     alum & mag hydroxide-simethicone (MYLANTA ES/MAALOX  ES) suspension 30 mL     atenolol (TENORMIN) tablet 50 mg     bisacodyl (DULCOLAX) Suppository 10 mg     diazepam (VALIUM) tablet 5-20 mg     folic acid (FOLVITE) tablet 1 mg     ibuprofen (ADVIL/MOTRIN) tablet 600 mg     loperamide (IMODIUM) capsule 2 mg     magnesium hydroxide (MILK OF MAGNESIA) suspension 30 mL     multivitamin w/minerals (THERA-VIT-M) tablet 1 tablet     nicotine (NICORETTE) gum 2-4 mg     OLANZapine (zyPREXA) tablet 10 mg    Or     OLANZapine (zyPREXA) injection 10 mg     ondansetron (ZOFRAN-ODT) ODT tab 4 mg     sertraline (ZOLOFT) tablet 50 mg     traZODone (DESYREL) tablet 50 mg     vitamin B1 (THIAMINE) tablet 100 mg       Allergies   Allergies   Allergen Reactions     Sulfa Drugs Hives       Physical Exam   Vital Signs: Temp: 97.7  F (36.5  C) Temp src: Oral BP: 113/78 Pulse: 61   Resp: 16 SpO2: 99 % O2 Device: None (Room air)    Weight: 140 lbs 0 oz    Constitutional: Awake, lying in bed, appears tired. Not conversational.   Eyes: Sclera clear, anicteric   ENT: Mucous membranes moist.   Respiratory: Breathing comfortably on room air. CTA bilaterally with no crackles, wheezing, or rhonchi. Good air entry throughout.   Cardiovascular:  RRR, normal S1/S2. No rubs or murmurs. Intact bilateral pedal pulses. No peripheral edema.   GI: Soft, non-tender, non-distended. Bowel sounds present.   Skin: Injection sites scattered throughout bilateral hands and wrists are all c/d/i with no erythema, drainage, fluctuance, or induration. Warm and dry. Good color.   Neurologic: Alert and oriented. No focal deficits. Moves all extremities.       Data   ROUTINE IP LABS (Last four results)  Recent Labs   Lab 11/07/19  1238 11/06/19  1331    137   POTASSIUM 4.0 4.0   CHLORIDE 107 105    CO2 27 30   ANIONGAP 6 2*   GLC 89 84   BUN 7 8   CR 0.56 0.63   MAKAYLA 9.1 9.4   PROTTOTAL 7.2 7.9   ALBUMIN 3.9 4.1   BILITOTAL 0.4 0.4   ALKPHOS 66 70   AST 26 21   ALT 23 27     Recent Labs   Lab 11/08/19  0642 11/06/19  1331   WBC 5.1 7.5   RBC 4.16 4.43   HGB 13.5 14.3   HCT 41.3 43.2   MCV 99 98   MCH 32.5 32.3   MCHC 32.7 33.1   RDW 12.5 11.9    305     No lab results found in last 7 days.     Glucose Values Latest Ref Rng & Units 11/6/2019 11/7/2019   Bedside Glucose (mg/dl )  - -- --   GLUCOSE 70 - 99 mg/dL 84 89   Some recent data might be hidden

## 2019-11-08 NOTE — PROGRESS NOTES
"Pt requesting to be discharged. She states she \"wants to check on her kids\". Pt denies SI. Pt vague in her answers. She states she does not feel safe in the hospital but is unwilling to offer any detail. She states she feels \"safe\"  If discharged.Pt does not appear to be in any signs of withdrawal. Pt reluctantly agreed to stay and cancel her discharge request.  "

## 2019-11-08 NOTE — PLAN OF CARE
Behavioral Team Discussion: (11/8/2019)    Continued Stay Criteria/Rationale: Patient admitted for Chemical Use Issues.  Plan: The following services will be provided to the patient; psychiatric assessment, medication management, therapeutic milieu, individual and group support, and skills groups.   Participants: 3A Provider: Dr. Roque Qiu MD; 3A RN's: Bharat Peoples, RN; 3A CM's: Ashli Isabel.  Summary/Recommendation: Providers will assess today for treatment recommendations, discharge planning, and aftercare plans. CM will meet with pt for discharge planning.   Medical/Physical: Per ED note:    Cerebral infarction (H)       2 heart attacks 4/2016     Myocardial infarction (H)       Substance abuse (H)      Precautions:   Behavioral Orders   Procedures     Code 1 - Restrict to Unit     Routine Programming     As clinically indicated     Status 15     Every 15 minutes.     Withdrawal precautions     Rationale for change in precautions or plan: N/A  Progress: Initial.

## 2019-11-08 NOTE — PROGRESS NOTES
11/07/19 3496   Patient Belongings   Did you bring any home meds/supplements to the hospital?  Yes   Disposition of meds  Sent to security/pharmacy per site process   Patient Belongings other (see comments)   Patient Belongings Remaining with Patient other (see comments)   Belongings Search Yes   Clothing Search Yes   Second Staff Mavis Zuñiga   storage bin: scarf, purse full of misc stuff, backpack full of misc stuff and toiletries, jacket, hoodie with string x3, pants with string, plastic bag full of makup and misc,  Small zebra striped bag with scissors, tampons and misc.    In storage room: two metal suitcases with extra clothing.     Box in med room: phone,  cord, wallet    Security env # [id number]: meds    Security env # [id number]: 6 visa cards, 3 mastercards, $7.00 cash, social security card    A               Admission:  I am responsible for any personal items that are not sent to the safe or pharmacy.  [facility] is not responsible for loss, theft or damage of any property in my possession.    Signature:  _________________________________ Date: _______  Time: _____                                              Staff Signature:  ____________________________ Date: ________  Time: _____      2nd Staff person, if patient is unable/unwilling to sign:    Signature: ________________________________ Date: ________  Time: _____     Discharge:  [facility] has returned all of my personal belongings:    Signature: _________________________________ Date: ________  Time: _____                                          Staff Signature:  ____________________________ Date: ________  Time: _____

## 2019-11-09 PROCEDURE — 25000132 ZZH RX MED GY IP 250 OP 250 PS 637: Performed by: NURSE PRACTITIONER

## 2019-11-09 PROCEDURE — 25000132 ZZH RX MED GY IP 250 OP 250 PS 637: Performed by: PSYCHIATRY & NEUROLOGY

## 2019-11-09 PROCEDURE — 12800000 ZZH R&B CD/MH ADULT

## 2019-11-09 RX ADMIN — FOLIC ACID 1 MG: 1 TABLET ORAL at 12:18

## 2019-11-09 RX ADMIN — SERTRALINE HYDROCHLORIDE 50 MG: 50 TABLET ORAL at 12:18

## 2019-11-09 RX ADMIN — MULTIPLE VITAMINS W/ MINERALS TAB 1 TABLET: TAB at 12:19

## 2019-11-09 RX ADMIN — IBUPROFEN 600 MG: 600 TABLET, FILM COATED ORAL at 12:19

## 2019-11-09 RX ADMIN — Medication 100 MG: at 12:19

## 2019-11-09 RX ADMIN — TRAZODONE HYDROCHLORIDE 50 MG: 50 TABLET ORAL at 20:53

## 2019-11-09 RX ADMIN — OLANZAPINE 10 MG: 5 TABLET, FILM COATED ORAL at 20:53

## 2019-11-09 RX ADMIN — IBUPROFEN 600 MG: 600 TABLET, FILM COATED ORAL at 20:53

## 2019-11-09 ASSESSMENT — ACTIVITIES OF DAILY LIVING (ADL)
LAUNDRY: WITH SUPERVISION
HYGIENE/GROOMING: INDEPENDENT
HYGIENE/GROOMING: INDEPENDENT
ORAL_HYGIENE: INDEPENDENT
ORAL_HYGIENE: INDEPENDENT
DRESS: INDEPENDENT
DRESS: INDEPENDENT

## 2019-11-09 NOTE — PLAN OF CARE
"Pt meets all criteria to be removed from MSSA monitoring. Per unit protocol, Pt now \"out of detox\".    "

## 2019-11-09 NOTE — PROGRESS NOTES
Patient has not scored greater than an 8 on MSSA monitoring for alcohol withdrawal and has not required Valium for over 24 hours. Patient is removed from detox status for alcohol withdrawal.

## 2019-11-09 NOTE — PLAN OF CARE
Patient in bed all shift, sleeping intermittently. Patient denies SI, SIB, HI, or hallucinations. Patient refused vital signs once. MSSA only performed once, and was a 6. Patient refused dinner or snacks, due to excessive sleepiness. Given fresh water and encouraged to drink fluids.

## 2019-11-10 PROCEDURE — 12800000 ZZH R&B CD/MH ADULT

## 2019-11-10 PROCEDURE — 25000132 ZZH RX MED GY IP 250 OP 250 PS 637: Performed by: PSYCHIATRY & NEUROLOGY

## 2019-11-10 PROCEDURE — 25000132 ZZH RX MED GY IP 250 OP 250 PS 637: Performed by: NURSE PRACTITIONER

## 2019-11-10 RX ORDER — LORATADINE 10 MG/1
10 TABLET ORAL DAILY PRN
Status: DISCONTINUED | OUTPATIENT
Start: 2019-11-10 | End: 2019-11-13 | Stop reason: HOSPADM

## 2019-11-10 RX ADMIN — FOLIC ACID 1 MG: 1 TABLET ORAL at 12:14

## 2019-11-10 RX ADMIN — OLANZAPINE 10 MG: 5 TABLET, FILM COATED ORAL at 18:30

## 2019-11-10 RX ADMIN — Medication 100 MG: at 12:14

## 2019-11-10 RX ADMIN — IBUPROFEN 600 MG: 600 TABLET, FILM COATED ORAL at 18:30

## 2019-11-10 RX ADMIN — LORATADINE 10 MG: 10 TABLET ORAL at 13:51

## 2019-11-10 RX ADMIN — MULTIPLE VITAMINS W/ MINERALS TAB 1 TABLET: TAB at 12:14

## 2019-11-10 RX ADMIN — SERTRALINE HYDROCHLORIDE 50 MG: 50 TABLET ORAL at 12:14

## 2019-11-10 ASSESSMENT — ACTIVITIES OF DAILY LIVING (ADL)
DRESS: INDEPENDENT
ORAL_HYGIENE: INDEPENDENT
ORAL_HYGIENE: INDEPENDENT
LAUNDRY: WITH SUPERVISION
DRESS: INDEPENDENT
HYGIENE/GROOMING: INDEPENDENT
HYGIENE/GROOMING: INDEPENDENT

## 2019-11-10 NOTE — PROGRESS NOTES
"20:55 -- Pt requested PRN medications: Ibuprofen, trazodone, and \"something for anxiety\"    Administered Zyprexa 10 mg for anxiety  "

## 2019-11-10 NOTE — PLAN OF CARE
Patient affect flat, blunted, and irritable edge. Mood is reported as anxious. Patient denies SI, SIB, HI, or hallucinations. Patient is guarded with staff and continues to isolate to her room, due to paranoia. Patient VSS and reported no additional concerns.

## 2019-11-11 ENCOUNTER — TELEPHONE (OUTPATIENT)
Dept: BEHAVIORAL HEALTH | Facility: CLINIC | Age: 44
End: 2019-11-11

## 2019-11-11 PROCEDURE — 25000132 ZZH RX MED GY IP 250 OP 250 PS 637: Performed by: NURSE PRACTITIONER

## 2019-11-11 PROCEDURE — 99232 SBSQ HOSP IP/OBS MODERATE 35: CPT | Performed by: PSYCHIATRY & NEUROLOGY

## 2019-11-11 PROCEDURE — 25000132 ZZH RX MED GY IP 250 OP 250 PS 637: Performed by: PSYCHIATRY & NEUROLOGY

## 2019-11-11 PROCEDURE — 12800000 ZZH R&B CD/MH ADULT

## 2019-11-11 RX ORDER — OLANZAPINE 5 MG/1
10 TABLET ORAL AT BEDTIME
Status: DISCONTINUED | OUTPATIENT
Start: 2019-11-11 | End: 2019-11-13 | Stop reason: HOSPADM

## 2019-11-11 RX ORDER — OLANZAPINE 2.5 MG/1
2.5 TABLET, FILM COATED ORAL 3 TIMES DAILY PRN
Status: DISCONTINUED | OUTPATIENT
Start: 2019-11-11 | End: 2019-11-13 | Stop reason: HOSPADM

## 2019-11-11 RX ADMIN — SERTRALINE HYDROCHLORIDE 50 MG: 50 TABLET ORAL at 10:38

## 2019-11-11 RX ADMIN — Medication 100 MG: at 10:39

## 2019-11-11 RX ADMIN — FOLIC ACID 1 MG: 1 TABLET ORAL at 10:38

## 2019-11-11 RX ADMIN — OLANZAPINE 2.5 MG: 2.5 TABLET, FILM COATED ORAL at 13:38

## 2019-11-11 RX ADMIN — MULTIPLE VITAMINS W/ MINERALS TAB 1 TABLET: TAB at 10:38

## 2019-11-11 ASSESSMENT — ACTIVITIES OF DAILY LIVING (ADL)
DRESS: INDEPENDENT
LAUNDRY: WITH SUPERVISION
ORAL_HYGIENE: INDEPENDENT
HYGIENE/GROOMING: INDEPENDENT
DRESS: INDEPENDENT
ORAL_HYGIENE: INDEPENDENT
HYGIENE/GROOMING: INDEPENDENT

## 2019-11-11 NOTE — TELEPHONE ENCOUNTER
LP SCREEN TELEPHONE NOTE   [facility] paperwork was reviewed by JONG Choudhary and the patient was deemed ELIGIBLE for the LP program.     Medical: Deferred, because this patient is currently on 3A IP detoxification unit.     Insurance: Blue Cross: MA/PMAP - No pre-authorization or certification is needed with Blue Cross MA/PMAP for the LP program or for [facility] substance abuse treatment programs.      This will be a: HALF evaluation. (LP UPDATE NOTE, ADDENDUM, VAA, GAIL's, ISP, DANNES, & SBAR)     IV use or Pregnant: This patient is not pregnant or an IV drug user.     Business office: The patient has Blue Cross MA/PMAP and would NOT need to consult with anyone in [place]'s business office about the out of pocket costs of the LP program.     List: This patient CAN be placed on the [facility] LP Waiting List at this time.       Group: [facility]     Additional Info as needed: NA     The best current contact telephone number for the patient is: 3A @ [phone],        Thanks,  JONG Choudhary   11/11/2019

## 2019-11-11 NOTE — PROGRESS NOTES
"[facility], [place]   Psychiatric Progress Note    Interim history   This is a 44 year old female with 1.  Psychosis, not otherwise specified.   2.  Posttraumatic stress disorder.   3.  Alcohol use disorder, severe.   4.  Methamphetamine use disorder, severe. .Pt seen in rounds. Patient's mood is sad anxious  Energy Level:MODERATE  Sleep:No concerns, sleeps well through night  Appetite:fair motivation interest improving   Denied any Suicidal/homicidal ideation/plan intent  Psychosis  Denied any auditory/visual hallucinations   Delusions of reference,delusion of  Persecution.  Very paranoid here- feels like pt here are fake pt , some of the stuff they say is very rehreased\"    No prior suicde attempts  No access to gun    Tolerating meds and has no side effects.              Medications:     Current Facility-Administered Medications   Medication     acetaminophen (TYLENOL) tablet 650 mg     alum & mag hydroxide-simethicone (MYLANTA ES/MAALOX  ES) suspension 30 mL     atenolol (TENORMIN) tablet 50 mg     bisacodyl (DULCOLAX) Suppository 10 mg     diazepam (VALIUM) tablet 5-20 mg     folic acid (FOLVITE) tablet 1 mg     ibuprofen (ADVIL/MOTRIN) tablet 600 mg     loperamide (IMODIUM) capsule 2 mg     loratadine (CLARITIN) tablet 10 mg     magnesium hydroxide (MILK OF MAGNESIA) suspension 30 mL     multivitamin w/minerals (THERA-VIT-M) tablet 1 tablet     nicotine (NICORETTE) gum 2-4 mg     OLANZapine (zyPREXA) tablet 10 mg    Or     OLANZapine (zyPREXA) injection 10 mg     ondansetron (ZOFRAN-ODT) ODT tab 4 mg     sertraline (ZOLOFT) tablet 50 mg     traZODone (DESYREL) tablet 50 mg     vitamin B1 (THIAMINE) tablet 100 mg             Allergies:     Allergies   Allergen Reactions     Sulfa Drugs Hives            Psychiatric Examination:   Blood pressure 129/89, pulse 90, temperature 97.2  F (36.2  C), temperature source Oral, resp. rate 16, height 1.651 m (5' 5\"), weight 63.5 kg (140 lb), " SpO2 99 %.  Weight is 140 lbs 0 oz  Body mass index is 23.3 kg/m .    Appearance:  awake, alert and adequately groomed  Attitude:  guarded  Eye Contact:  poor   Mood:  sad   Affect:  appropriate and in normal range and intensity is blunted  Speech:  clear, coherent rate /rhythm are good  Psychomotor Behavior:  no evidence of tardive dyskinesia, dystonia, or tics and intact station, gait and muscle tone  Throught Process:  illogical  Associations:  no loose associations  Thought Content:  no evidence of suicidal ideation or homicidal ideation and paraniod, delusion of persecution/reference   Insight:  limited  Judgement:  limited  Oriented to:  time, person, and place  Attention Span and Concentration:  intact  Recent and Remote Memory:  intact  Language fund of knowledge are adequate         Labs:     No results found for: NTBNPI, NTBNP  Lab Results   Component Value Date    WBC 5.1 11/08/2019    HGB 13.5 11/08/2019    HCT 41.3 11/08/2019    MCV 99 11/08/2019     11/08/2019     Lab Results   Component Value Date    TSH 0.53 11/08/2019            DIAGNOSES:   Axis I:     1.  Psychosis, not otherwise specified.   2.  Posttraumatic stress disorder.   3.  Alcohol use disorder, severe.   4.  Methamphetamine use disorder, severe.   PLAN:  pt is out of detox    The patien will continue  Zoloft for her PTSD.  .  She will have Zyprexa  PRN to take for paranoia ans scheduled at bed time .  The patient initially wanted to leave, but now she is willing to stay and wants to get help.  Laboratory/Imaging: reviewed with patient   Laboratory/Imaging: reviewed with patient   Consults: internal medicine consult reviewed  Patient will be treated in therapeutic milieu with appropriate individual and group therapies as described.  PDMP CHECKED     Medical diagnoses to be addressed this admission:    Plan:  Polysubstance abuse: Utox in the ED + for amphetamines, ABBY 0.0. Using IV methamphetamine 3-3.5 grams daily (last used 11/5),  alcohol 1/2 liter daily (last use 11/5), and an unknown quantity of benzodiazepines on occasion (last used AM of 11/7). Had a prior seizure in the setting of alcohol and heroin intoxication. Injects methamphetamine into bilateral hands/wrists, no e/o infection on exam.    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 Repeat echo later in 4/2016 with LVEF 55-60% and no cardiac dysfunction. Carries a diagnosis of chronic systolic CHF in her chart but no symptoms, normal echo, and not on any cardiac medications - question if this is an erroneous diagnosis carried forward since her OSH abnormal echo. Presented to ED 11/6 with chest pain, EKG and trop negative, now resolved. Has intermittent chest pain, previously felt to be due to cocaine-induced coronary vasospasms.   - Continue to monitor   - Notify Medicine for any chest pain, dyspnea, or other concerning cardiac symptoms      Legal Status: voluntary    Safety Assessment:   Checks:  15 min  Precautions: withdrawal precautions  Pt has not required locked seclusion or restraints in the past 24 hours to maintain safety, please refer to RN documentation for further details.  Discussed with patient many issues of addiction,triggers, relapse, and establishing a solid recovery program.  Able to give informed consent:  YES   Discussed Risks/Benefits/Side Effects/Alternatives: YES    After discussion of the indications, risks, benefits, alternatives and consequences of no treatment, the patient elects to complete detox adnd to trt

## 2019-11-11 NOTE — PLAN OF CARE
Patient affect flat and has an irritable edge, mood is reported as anxious. Patient is guarded, not paranoid. Patient visible in the milieu, watching tv part of the evening. Patient denies SI, SIB, HI, or hallucinations. Patient denies any additional concerns. Had prn zyprexa for agitation.

## 2019-11-11 NOTE — PROGRESS NOTES
"Pt approached this writer and requested to be discharged. She was very vague about her intentions just saying \"I guess I want to be discharged\". While waiting for Dr. Qiu to respond to my page, the patient changed her mind and states she no longer wishes to be discharged. Dr. Qiu informed of pt's decision. She stated if pt asks again to be discharged, she is to sign a 72 hour intent and Dr. Solo will see the patient in the morning. Pt was informed and was acceptable of these instructions.  "

## 2019-11-11 NOTE — PROGRESS NOTES
"Case Management Note  11/11/2019    Met with pt in the am to discuss discharge planning. Pt reported plan was residential treatment \"I guess, if that's what you say I have to do.\" Writer attempted to discern what patient would prefer to do. Ultimately, pt reported she would prefer to do IOP if staff would let her but reported she was willing to do what the doctors said she had to do. Talked with pt about various programs ([facility], [facility], [facility], [facility], [facility]) and agreed to meet later in the afternoon.     Shortly after 12 PM, pt reported she would like to leave if she could not get into treatment today. Writer explained likelihood was this would not happen. Pt was initially willing to still sign releases for [facility] and [facility]. Pt informed that [facility] and [facility] are 2-4 week waits.     Around 12:40 PM, pt indicated she was unwilling to sign releases as previously discussed. Writer explained that referral could be started so she could follow up in the community. Pt looked out the window in her bedroom and repeatedly shook her head. Pt at times whispered \"I don't get it\" and \"no\". Pt asked repeatedly to be discharged. Referred to nurse.     At 1:25 PM, pt was willing to meet with this writer again. Pt asking about going upstairs to . Another patient had told pt there was a bed today. Writer explained for a second time that the info on bed availability may not be accurate, and referral process has various steps involved (screening, funding, etc.) so no admission could be secured necessarily just because there was a bed.     Writer asked pt about why she is here, why she is attempting to be sober. Pt reports she is motivated by her kids, the youngest of whom is 8. Pt became tearful, reported all of her kids hate her and her family has discontinued relationships with her. Pt reports she was trying to fix things over the past year until recently, July she thinks, when she began " using again. Pt reports she doesn't go around family when she is using. Pt ultimately agreed to sign releases for [facility] and [facility] and be referred. Pt continued to view the releases suspiciously, and stated multiple times feeling confused about the process. When asked what questions she had, pt could not identify any specific questions or areas of confusion. Writer explained several times she was signing a release of information so that this writer could send her information to these two programs for referral for treatment. Pt verbally endorsed a willingness to sign these documents multiple times. Pt's info sent to [facility] and [facility] for review. Pt willing to stay and wait for treatment at this time. CM continues.     Jesusita Orr, CHRI[facility], [facility]

## 2019-11-12 PROCEDURE — 99232 SBSQ HOSP IP/OBS MODERATE 35: CPT | Performed by: PSYCHIATRY & NEUROLOGY

## 2019-11-12 PROCEDURE — H2032 ACTIVITY THERAPY, PER 15 MIN: HCPCS

## 2019-11-12 PROCEDURE — 25000132 ZZH RX MED GY IP 250 OP 250 PS 637: Performed by: NURSE PRACTITIONER

## 2019-11-12 PROCEDURE — 99207 ZZC CDG-MDM COMPONENT: MEETS MODERATE - UP CODED: CPT | Performed by: PSYCHIATRY & NEUROLOGY

## 2019-11-12 PROCEDURE — 12800000 ZZH R&B CD/MH ADULT

## 2019-11-12 PROCEDURE — 25000132 ZZH RX MED GY IP 250 OP 250 PS 637: Performed by: PSYCHIATRY & NEUROLOGY

## 2019-11-12 RX ORDER — OLANZAPINE 10 MG/1
10 TABLET ORAL AT BEDTIME
Qty: 30 TABLET | Refills: 0 | Status: ON HOLD | OUTPATIENT
Start: 2019-11-12 | End: 2019-12-26

## 2019-11-12 RX ORDER — NICOTINE 21 MG/24HR
1 PATCH, TRANSDERMAL 24 HOURS TRANSDERMAL DAILY
Status: DISCONTINUED | OUTPATIENT
Start: 2019-11-12 | End: 2019-11-13 | Stop reason: HOSPADM

## 2019-11-12 RX ORDER — OLANZAPINE 2.5 MG/1
2.5 TABLET, FILM COATED ORAL 3 TIMES DAILY PRN
Qty: 30 TABLET | Refills: 1 | Status: ON HOLD | OUTPATIENT
Start: 2019-11-12 | End: 2019-12-26

## 2019-11-12 RX ORDER — MULTIPLE VITAMINS W/ MINERALS TAB 9MG-400MCG
1 TAB ORAL DAILY
Qty: 30 TABLET | Refills: 0 | Status: SHIPPED | OUTPATIENT
Start: 2019-11-13

## 2019-11-12 RX ADMIN — OLANZAPINE 2.5 MG: 2.5 TABLET, FILM COATED ORAL at 11:25

## 2019-11-12 RX ADMIN — NICOTINE POLACRILEX 2 MG: 2 GUM, CHEWING BUCCAL at 11:25

## 2019-11-12 RX ADMIN — NICOTINE POLACRILEX 4 MG: 2 GUM, CHEWING BUCCAL at 16:22

## 2019-11-12 RX ADMIN — IBUPROFEN 600 MG: 600 TABLET, FILM COATED ORAL at 09:08

## 2019-11-12 RX ADMIN — TRAZODONE HYDROCHLORIDE 50 MG: 50 TABLET ORAL at 20:09

## 2019-11-12 RX ADMIN — OLANZAPINE 10 MG: 5 TABLET, FILM COATED ORAL at 20:08

## 2019-11-12 RX ADMIN — MULTIPLE VITAMINS W/ MINERALS TAB 1 TABLET: TAB at 08:50

## 2019-11-12 RX ADMIN — SERTRALINE HYDROCHLORIDE 50 MG: 50 TABLET ORAL at 08:50

## 2019-11-12 RX ADMIN — ACETAMINOPHEN 650 MG: 325 TABLET, FILM COATED ORAL at 20:08

## 2019-11-12 RX ADMIN — OLANZAPINE 2.5 MG: 2.5 TABLET, FILM COATED ORAL at 16:22

## 2019-11-12 RX ADMIN — Medication 100 MG: at 08:50

## 2019-11-12 RX ADMIN — FOLIC ACID 1 MG: 1 TABLET ORAL at 08:50

## 2019-11-12 RX ADMIN — NICOTINE 1 PATCH: 14 PATCH, EXTENDED RELEASE TRANSDERMAL at 11:52

## 2019-11-12 RX ADMIN — IBUPROFEN 600 MG: 600 TABLET, FILM COATED ORAL at 16:22

## 2019-11-12 ASSESSMENT — ACTIVITIES OF DAILY LIVING (ADL)
HYGIENE/GROOMING: INDEPENDENT
LAUNDRY: WITH SUPERVISION
ORAL_HYGIENE: INDEPENDENT
DRESS: INDEPENDENT
DRESS: INDEPENDENT
HYGIENE/GROOMING: INDEPENDENT
ORAL_HYGIENE: INDEPENDENT

## 2019-11-12 ASSESSMENT — ANXIETY QUESTIONNAIRES
2. NOT BEING ABLE TO STOP OR CONTROL WORRYING: NEARLY EVERY DAY
1. FEELING NERVOUS, ANXIOUS, OR ON EDGE: MORE THAN HALF THE DAYS
4. TROUBLE RELAXING: MORE THAN HALF THE DAYS
3. WORRYING TOO MUCH ABOUT DIFFERENT THINGS: MORE THAN HALF THE DAYS
GAD7 TOTAL SCORE: 14
6. BECOMING EASILY ANNOYED OR IRRITABLE: SEVERAL DAYS
5. BEING SO RESTLESS THAT IT IS HARD TO SIT STILL: NEARLY EVERY DAY
IF YOU CHECKED OFF ANY PROBLEMS ON THIS QUESTIONNAIRE, HOW DIFFICULT HAVE THESE PROBLEMS MADE IT FOR YOU TO DO YOUR WORK, TAKE CARE OF THINGS AT HOME, OR GET ALONG WITH OTHER PEOPLE: SOMEWHAT DIFFICULT
7. FEELING AFRAID AS IF SOMETHING AWFUL MIGHT HAPPEN: SEVERAL DAYS

## 2019-11-12 ASSESSMENT — PATIENT HEALTH QUESTIONNAIRE - PHQ9: SUM OF ALL RESPONSES TO PHQ QUESTIONS 1-9: 12
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
## 2019-11-12 NOTE — PROGRESS NOTES
"[facility], [place]   Psychiatric Progress Note    Interim history   This is a 44 year old female with 1.  Psychosis, not otherwise specified.   2.  Posttraumatic stress disorder.   3.  Alcohol use disorder, severe.   4.  Methamphetamine use disorder, severe. .Pt seen in rounds. Patient's mood is sad anxious  Energy Level:MODERATE  Sleep:No concerns, sleeps well through night  Appetite:fair motivation interest improving   Denied any Suicidal/homicidal ideation/plan intent  Psychosis  Denied any auditory/visual hallucinations   Delusions of reference,delusion of  Persecution.  Very paranoid here- feels like pt here are fake pt , some of the stuff they say is very rehreased\"    No prior suicde attempts  No access to gun    Tolerating meds and has no side effects.              Medications:     Current Facility-Administered Medications   Medication     acetaminophen (TYLENOL) tablet 650 mg     alum & mag hydroxide-simethicone (MYLANTA ES/MAALOX  ES) suspension 30 mL     atenolol (TENORMIN) tablet 50 mg     bisacodyl (DULCOLAX) Suppository 10 mg     diazepam (VALIUM) tablet 5-20 mg     folic acid (FOLVITE) tablet 1 mg     ibuprofen (ADVIL/MOTRIN) tablet 600 mg     loperamide (IMODIUM) capsule 2 mg     loratadine (CLARITIN) tablet 10 mg     magnesium hydroxide (MILK OF MAGNESIA) suspension 30 mL     multivitamin w/minerals (THERA-VIT-M) tablet 1 tablet     nicotine (NICORETTE) gum 2-4 mg     OLANZapine (zyPREXA) tablet 10 mg     OLANZapine (zyPREXA) tablet 2.5 mg     ondansetron (ZOFRAN-ODT) ODT tab 4 mg     sertraline (ZOLOFT) tablet 50 mg     traZODone (DESYREL) tablet 50 mg     vitamin B1 (THIAMINE) tablet 100 mg             Allergies:     Allergies   Allergen Reactions     Sulfa Drugs Hives            Psychiatric Examination:   Blood pressure 132/88, pulse 99, temperature 96.7  F (35.9  C), temperature source Oral, resp. rate 16, height 1.651 m (5' 5\"), weight 63.5 kg (140 lb), SpO2 97 " %.  Weight is 140 lbs 0 oz  Body mass index is 23.3 kg/m .    Appearance:  awake, alert and adequately groomed  Attitude:  guarded  Eye Contact:  poor   Mood:  sad   Affect:  appropriate and in normal range and intensity is blunted  Speech:  clear, coherent rate /rhythm are good  Psychomotor Behavior:  no evidence of tardive dyskinesia, dystonia, or tics and intact station, gait and muscle tone  Throught Process:  illogical  Associations:  no loose associations  Thought Content:  no evidence of suicidal ideation or homicidal ideation and paraniod, delusion of persecution/reference   Insight:  limited  Judgement:  limited  Oriented to:  time, person, and place  Attention Span and Concentration:  intact  Recent and Remote Memory:  intact  Language fund of knowledge are adequate         Labs:     No results found for: NTBNPI, NTBNP  Lab Results   Component Value Date    WBC 5.1 11/08/2019    HGB 13.5 11/08/2019    HCT 41.3 11/08/2019    MCV 99 11/08/2019     11/08/2019     Lab Results   Component Value Date    TSH 0.53 11/08/2019            DIAGNOSES:   Axis I:     1.  Psychosis, not otherwise specified.   2.  Posttraumatic stress disorder.   3.  Alcohol use disorder, severe.   4.  Methamphetamine use disorder, severe.   Plan  PLAN:  pt is out of detox    The patien will continue  Zoloft for her PTSD.  .  She will have Zyprexa  PRN to take for paranoia ans scheduled at bed time .  The patient initially wanted to leave, but now she is willing to stay and wants to get help.  Laboratory/Imaging: reviewed with patient   Consults: internal medicine consult reviewed  Patient will be treated in therapeutic milieu with appropriate individual and group therapies as described.  PDMP CHECKED     Medical diagnoses to be addressed this admission:    Plan:  Polysubstance abuse: Utox in the ED + for amphetamines, ABBY 0.0. Using IV methamphetamine 3-3.5 grams daily (last used 11/5), alcohol 1/2 liter daily (last use 11/5), and  an unknown quantity of benzodiazepines on occasion (last used AM of 11/7). Had a prior seizure in the setting of alcohol and heroin intoxication. Injects methamphetamine into bilateral hands/wrists, no e/o infection on exam.    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 Repeat echo later in 4/2016 with LVEF 55-60% and no cardiac dysfunction. Carries a diagnosis of chronic systolic CHF in her chart but no symptoms, normal echo, and not on any cardiac medications - question if this is an erroneous diagnosis carried forward since her OSH abnormal echo. Presented to ED 11/6 with chest pain, EKG and trop negative, now resolved. Has intermittent chest pain, previously felt to be due to cocaine-induced coronary vasospasms.   - Continue to monitor   - Notify Medicine for any chest pain, dyspnea, or other concerning cardiac symptoms      Legal Status: voluntary    Safety Assessment:   Checks:  15 min  Precautions: withdrawal precautions  Pt has not required locked seclusion or restraints in the past 24 hours to maintain safety, please refer to RN documentation for further details.  Discussed with patient many issues of addiction,triggers, relapse, and establishing a solid recovery program.  Able to give informed consent:  YES   Discussed Risks/Benefits/Side Effects/Alternatives: YES    After discussion of the indications, risks, benefits, alternatives and consequences of no treatment, the patient elects to complete detox adnd to trt

## 2019-11-12 NOTE — PROGRESS NOTES
Case Management Note  11/12/2019    In AM, pt requesting to be discharged. Pt unwilling to stay and wait for treatment bed. Explained to pt where we are at with various referrals started yesterday. Pt still focused on leaving.     Writer spoke with pt's sister, who also described pt's behavior as paranoid, suspicious. Pt's sister reports this is new behavior for pt, and that the chronic meth abuse is recent- pt had only experimented in the past. Pt's sister reports ultimately if pt pushes discharge then a family member will drive and pick her up and bring her to her parents' house, but they would prefer pt go [id number]. Pt was able to get into contact with Cris Mack and was no longer advocating to leave.     Later in afternoon, met with pt, completed Rule 25 update, LP addendum. Pt more hopeful now about [facility], [address], or LP as options. Pt now willing to stay.     Jesusita Orr, CHRISTA, LADC

## 2019-11-12 NOTE — PROGRESS NOTES
[facility]  [address] [place] [id number]        Assessment and Placement Summary Update     Patient name:   Serenity Jolly   Patient phone: [phone] (home)    Last #:   [id number]   : 1975      PMI #: [id number]   Patient address:   [address] [place] [address]   Date of Original Assessment / Last Update: 10/14/19 Update Assessment Date: 2019   Updated by:   JONG Self    phone number: [phone]   Referred by:   Self Agency / phone number: NA   Referral to:   [facility]  [facility] NPI: Lilian NPI #: [id number]   Summary:  This patient had a Rule 25 Assessment on 10/14/19 at [facility] completed by JONG Ventura.  OUTSIDE: A paper copy of the Rule 25 Assessment will be placed in the patient's paper chart until being scanned into the patient's electronic medical record in Epic under the Media tab.    Reason for today's update: Updated referral to treatment, additional mental health symptoms       Substance Use History Update:           X = Primary Drug Used   Age of First Use Most Recent Pattern of Use and Duration   Need enough information to show pattern (both frequency and amounts) and to show tolerance for each chemical that has a diagnosis   Date of last use and time, if needed   Withdrawal Potential? Requiring special care Method of use  (oral, smoked, snort, IV, etc)      Alcohol     No use           X Marijuana/  Hashish 15 Current: 1-2x per week, a few hits per time  Heaviest: Age 19, smoked daily 1 week ago No Smoke      Cocaine/Crack   No use         X Meth/  Amphetamines 19 Current/heaviest: 1/2 gram/day for the past few months, prior to that would use mixed with heroin, not as constantly as heroin but with some pattern 19 No Smoke     X Heroin     42 Current: No current use  Heaviest: 1/2 gm/day May- No Smoke      Other Opiates/  Synthetics No  "use          Inhalants   No use          Benzodiazepines No use          Hallucinogens   No use          Barbiturates/  Sedatives/  Hypnotics No use          Over-the-Counter Drugs No use          Other     No use          Nicotine     3rd grade Started smoking as a pattern age 16, stopped for most of adult life, restarted at age 38. 1/2 PPD 19 Yes Smoke     Dimension: Severity Rating/ Reason for Changes from Previous Assessment:  Dimension I: Acute intoxication/Withdrawal potential     Previous ratin Current ratin   Comments:   Pt appears to be out of withdrawal at this time. Pt is being treated for her withdrawal at [facility]'s inpatient detox unit. Pt denies a history of complicated withdrawal symptoms. Pt is capable of managing withdrawal concerns. Pt reports that her last use of meth was on 19. Pt was given a UA at time of ER admit and the UA was POS for amphetamines. Pt was given a breathalyzer at the time of detox admit and her ABBY was 0.00.     Dimension II: Biomedical Conditions and Complications     Previous ratin Current ratin   Comments:   Pt is capable of navigating the healthcare system autonomously. Pt denies any biomedical conditions that would interfere with treatment. Pt reports she has been compliant with medications. Pt's vital signs were last recorded on 2019 at 0818:  Vital signs:  Temp: 96.7  F (35.9  C) Temp src: Oral BP: 132/88 Pulse: 99   Resp: 16 SpO2: 97 %     Height: 165.1 cm (5' 5\") Weight: 63.5 kg (140 lb)  Estimated body mass index is 23.3 kg/m  as calculated from the following:    Height as of this encounter: 1.651 m (5' 5\").    Weight as of this encounter: 63.5 kg (140 lb).   Dimension III: Emotional/Behavioral/Cognitive     Previous ratin Current ratin   Comments:   Patient denied suicidal ideation or self-injurious behavior. Pt's diagnoses according to H&P completed by Dr. Lazarus MD:  DIAGNOSES:   Axis I:     1.  " Psychosis, not otherwise specified.   2.  Posttraumatic stress disorder.   3.  Alcohol use disorder, severe.   4.  Methamphetamine use disorder, severe.   Pt reports being prescribed medications for mental health. Pt reports minimal past treatment of mental health concerns. Patient has been superficially cooperative, suspicious, paranoid while on unit. Pt's mental health symptoms and substance use appear to be significantly correlated. Per collateral from patient's sister, Suze Osborn ([phone]), patient's suspicious behavior is a new development that occurred since patient began heavily abusing meth. Pt appears to have poor insight into how her mental health and substance use are related. Pt may benefit from individual therapy to address mental health symptoms, trauma.     Dimension IV: Readiness for Change     Previous ratin Current ratin   Comments:   Patient endorses internal motivation to be sober but is suspicious of treatment staff, reticent to work with treatment staff and follow recommendations. Patient was unable to follow through on residential treatment recommendation when in the community. Patient would benefit from iyrs-fd-cplp transfer of care.    Dimension V: Relapse/Continued Use/Continued problem potential     Previous ratin Current ratin   Comments:   Patient endorses tolerance, withdrawal, cravings, progressive use, loss of control. Patient lacks insight into her relapse cycle, triggers and cues. Patient lacks coping skills to arrest/prevent use, manage triggers. Patient has comorbid mental health concerns that increase risk of recidivism. Patient is at high risk of relapse at this time.     Dimension VI: Recovery environment    Previous rating:   3 Current rating:   3   Comments:   Patient reports she has been living with her parent since 2017 when not in treatment, brother and two sons, which she reports is a supportive environment. Pt's brother has  "addiction issues, this was a trigger for patient, they have used together in the past, she knew when he had drugs in the house. Patient has no daily structure or routine. Patient has 4 children- 3 boys and 1 girl, whom she reports are all mad at her due to her drug use. Pt's 2 oldest children live outside of the home, her youngest 2 sons live in the home. Patient lacks engagement in positive sober activities, lacks sufficient sober support network. Patient has lost important relationships due to her use. Patient has legal involvement with [facility]. Patient's environment is currently unsupportive at this time, could become supportive with active interventions.      Summary of Assessment Update and Recommendations:   What was the outcome of last referral?  Patient was referred to [facility], did not follow through due to still using, feeling very paranoid about the referral, whether it was a \"real place, I was scared for my kids, for myself, I thought they were acting weird.\" Pt went to [facility] twice and \"almost filled out the paperwork\" but did not actually stay and admit either time.    Reason for changes in the Risk Description since last assessment? No changes in risk ratings, though further mental health information was added to dimension three given patient's current presentation at unit 3A [place]. Patient was referred to more programs: [facility], [facility], [facility]. Patient is also now more willing to admit to [facility].    Recommendation and rationale for current request and significant issues that need to be addressed:    Residential or IOP w/ Lodging program  Consider a mental health evaluation after 4-6 weeks sobriety  Consider establishing individual therapy to address mental health symptoms, trauma      "

## 2019-11-12 NOTE — PLAN OF CARE
Patient affect blunted, flat, and irritable. Mood is irritable. Patient also noted to be paranoid and guarded with staff. Patient reportedly called for her fiance to visit earlier in the shift, did not inform staff, and denied she called him for a visit. Patient was hesitant to allow him to visit. Fiance visited and was concerned about patient behavior. Patient was agreeable to sign a release of information, and it was filled out, then patient declined to sign the form. Fiance was attempting to coerce patient to sign the form. Was irritable with writer that staff could not give him information. Visitor escorted out of unit at that time. Patient unhappy with hospital stay and had requested to discharge. Patient was offered a 72 hour intent to discharge form, but declined to fill it out. Patient retired to her room after dinner and stayed in bed the rest of the evening. Refused scheduled zyprexa at 2200. Patient demonstrating poor insight and possibly not retaining information provided due to paranoia and not tracking conversations. Declined any further concerns.

## 2019-11-13 ENCOUNTER — TELEPHONE (OUTPATIENT)
Dept: BEHAVIORAL HEALTH | Facility: CLINIC | Age: 44
End: 2019-11-13

## 2019-11-13 ENCOUNTER — HOSPITAL ENCOUNTER (OUTPATIENT)
Dept: BEHAVIORAL HEALTH | Facility: CLINIC | Age: 44
End: 2019-11-13
Attending: FAMILY MEDICINE
Payer: COMMERCIAL

## 2019-11-13 VITALS
BODY MASS INDEX: 23.32 KG/M2 | HEART RATE: 89 BPM | DIASTOLIC BLOOD PRESSURE: 81 MMHG | RESPIRATION RATE: 16 BRPM | TEMPERATURE: 97.8 F | WEIGHT: 140 LBS | SYSTOLIC BLOOD PRESSURE: 131 MMHG | HEIGHT: 65 IN | OXYGEN SATURATION: 98 %

## 2019-11-13 PROBLEM — F19.20 CHEMICAL DEPENDENCY (H): Status: ACTIVE | Noted: 2019-11-13

## 2019-11-13 PROCEDURE — 10020000 ZZH LODGING PLUS FACILITY CHARGE ADULT

## 2019-11-13 PROCEDURE — 25000132 ZZH RX MED GY IP 250 OP 250 PS 637: Performed by: PSYCHIATRY & NEUROLOGY

## 2019-11-13 PROCEDURE — H2035 A/D TX PROGRAM, PER HOUR: HCPCS | Mod: HQ

## 2019-11-13 PROCEDURE — 25000132 ZZH RX MED GY IP 250 OP 250 PS 637: Performed by: NURSE PRACTITIONER

## 2019-11-13 RX ORDER — ACETAMINOPHEN 325 MG/1
325-650 TABLET ORAL EVERY 4 HOURS PRN
Status: ON HOLD | COMMUNITY
End: 2023-03-02

## 2019-11-13 RX ORDER — LANOLIN ALCOHOL/MO/W.PET/CERES
3 CREAM (GRAM) TOPICAL
COMMUNITY

## 2019-11-13 RX ORDER — MAGNESIUM HYDROXIDE/ALUMINUM HYDROXICE/SIMETHICONE 120; 1200; 1200 MG/30ML; MG/30ML; MG/30ML
30 SUSPENSION ORAL EVERY 6 HOURS PRN
COMMUNITY

## 2019-11-13 RX ORDER — AMOXICILLIN 250 MG
2 CAPSULE ORAL DAILY PRN
Status: ON HOLD | COMMUNITY
End: 2023-02-27

## 2019-11-13 RX ORDER — IBUPROFEN 200 MG
400 TABLET ORAL EVERY 6 HOURS PRN
Status: ON HOLD | COMMUNITY
End: 2023-03-02

## 2019-11-13 RX ORDER — LORATADINE 10 MG/1
10 TABLET ORAL DAILY PRN
COMMUNITY

## 2019-11-13 RX ADMIN — Medication 100 MG: at 10:33

## 2019-11-13 RX ADMIN — OLANZAPINE 2.5 MG: 2.5 TABLET, FILM COATED ORAL at 10:33

## 2019-11-13 RX ADMIN — SERTRALINE HYDROCHLORIDE 50 MG: 50 TABLET ORAL at 10:33

## 2019-11-13 RX ADMIN — MULTIPLE VITAMINS W/ MINERALS TAB 1 TABLET: TAB at 10:33

## 2019-11-13 RX ADMIN — FOLIC ACID 1 MG: 1 TABLET ORAL at 10:34

## 2019-11-13 RX ADMIN — IBUPROFEN 600 MG: 600 TABLET, FILM COATED ORAL at 10:33

## 2019-11-13 ASSESSMENT — ANXIETY QUESTIONNAIRES
GAD7 TOTAL SCORE: 14
7. FEELING AFRAID AS IF SOMETHING AWFUL MIGHT HAPPEN: NEARLY EVERY DAY
1. FEELING NERVOUS, ANXIOUS, OR ON EDGE: NEARLY EVERY DAY
GAD7 TOTAL SCORE: 21
5. BEING SO RESTLESS THAT IT IS HARD TO SIT STILL: NEARLY EVERY DAY
6. BECOMING EASILY ANNOYED OR IRRITABLE: NEARLY EVERY DAY
3. WORRYING TOO MUCH ABOUT DIFFERENT THINGS: NEARLY EVERY DAY
IF YOU CHECKED OFF ANY PROBLEMS ON THIS QUESTIONNAIRE, HOW DIFFICULT HAVE THESE PROBLEMS MADE IT FOR YOU TO DO YOUR WORK, TAKE CARE OF THINGS AT HOME, OR GET ALONG WITH OTHER PEOPLE: SOMEWHAT DIFFICULT
2. NOT BEING ABLE TO STOP OR CONTROL WORRYING: NEARLY EVERY DAY
4. TROUBLE RELAXING: NEARLY EVERY DAY

## 2019-11-13 ASSESSMENT — PATIENT HEALTH QUESTIONNAIRE - PHQ9: SUM OF ALL RESPONSES TO PHQ QUESTIONS 1-9: 13

## 2019-11-13 NOTE — PROGRESS NOTES
Name: Serenity Jolly  Date: 11/13/2019  Medical Record: [id number]    Envelope Number: [id number]    List of Contents (List each item separately in new row):   One Red Iphone    Admission:  I am responsible for any personal items that are not sent to the safe or pharmacy.  [facility] is not responsible for loss, theft or damage of any property in my possession.      Patient Signature:  ___________________________________________       Date/Time:__________________________    Staff Signature: __________________________________       Date/Time:__________________________    2nd Staff person, if patient is unable/unwilling to sign:      __________________________________________________________       Date/Time: __________________________      Discharge:  [facility] has returned all of my personal belongings:    Patient Signature: ________________________________________     Date/Time: ____________________________________    Staff Signature: ______________________________________     Date/Time:_____________________________________

## 2019-11-13 NOTE — PLAN OF CARE
Patient visible in the milieu, more interactive with peers. Affect blunted, flat. Mood is anxious. Patient is still paranoid and guarded, at times. Patient denies SI, SIB, HI, or hallucinations. Patient is open to CD treatment at this time. Patient had no additional concerns.

## 2019-11-13 NOTE — PROGRESS NOTES
Name: Serenity Jolly  Date: 11/13/2019  Medical Record: [id number]    Envelope Number: [id number] (Repackaged from  #[id number])    List of Contents (List each item separately in new row):   Doxycycline 100mg  Hydroxyzine HCL 10mg  Ibuprofen  Bubble Pack of Cephlexin    Admission:  I am responsible for any personal items that are not sent to the safe or pharmacy.  [facility] is not responsible for loss, theft or damage of any property in my possession.      Patient Signature:  ___________________________________________       Date/Time:__________________________    Staff Signature: __________________________________       Date/Time:__________________________    2nd Staff person, if patient is unable/unwilling to sign:      __________________________________________________________       Date/Time: __________________________      Discharge:  [facility] has returned all of my personal belongings:    Patient Signature: ________________________________________     Date/Time: ____________________________________    Staff Signature: ______________________________________     Date/Time:_____________________________________

## 2019-11-13 NOTE — DISCHARGE SUMMARY
Admit Date:     11/07/2019   Discharge Date:     11/13/2019      More than 35 minutes was spent on this discharge summary, doing the discharge instructions, discharge medications, and discharge mental status examination.      DISCHARGE DIAGNOSES:   Axis I:     1.  Psychosis, not otherwise specified.   2.  Posttraumatic stress disorder (PTSD).   3.  Alcohol use disorder, severe.   4.  Methamphetamine use disorder.      Please see detailed admission note by Dr. Qiu on 11/08/2019.      HOSPITAL COURSE:  During the hospitalization, the patient was detoxed off with MSSA protocol and Valium.  She was put on Zoloft for her PTSD.  She was put on Zyprexa 10 mg and 25 mg.  She was seen in Internal Medicine consult,  completed on  11/08.  During the hospitalization, the patient's psychosis has improved.  She still has some psychosis but for the most part, she is better.  She is more interactive, guarded at times but did not have any suicidal ideation, plan or intent.  She met with the , and the plan is to do treatment at [facility].      DISCHARGE DISPOSITION:  The patient going to [facility].      DISCHARGE MEDICATIONS:  Include Zoloft 25 mg, Zyprexa 10 mg, Risperdal 2.5 mg 3 times a day.  The patient does not have any active suicidal ideation, plan or intent.  The patient is stable to be discharged.               DISCHARGE MENTAL STATUS EXAMINATION:  The patient is alert, oriented x3.  Good fund of knowledge.  Good use of language.  Recent and remote memory, language, fund of knowledge are all adequate.  Euthymic mood congruent affect  Speech normal rate/rhythm linear tp no loose asso,The patient does not have any active suicidal or homicidal ideation.  Does not have any auditory or visual hallucination.  Fair insight/judgment At this time, the patient was stable to be discharged.        Pt was not determined to not be a danger to himself or others. At the current time of discharge, the patient does not  meet criteria for involuntary hospitalization. On the day of discharge, the patient reports that they do not have suicidal or homicidal ideation and would never hurt themselves or others. Steps taken to minimize risk include: assessing patient s behavior and thought process daily during hospital stay, discharging patient with adequate plan for follow up for mental and physical health and discussing safety plan of returning to the hospital should the patient ever have thoughts of harming themselves or others. Therefore, based on all available evidence including the factors cited above, the patient does not appear to be at imminent risk for self-harm, and is appropriate for outpatient level of care.     Educated about side effects/risk vs benefits /alternative including non treatment.Pt consented to be on medication.     .Total time spent on discharge summary more than 35 min  More than  20 min  planning, coordination of care, medication reconciliation and performance of physical exam on day of discharge.Care was coordinated with unit RN and unit therapist       Gui Jennings   Home Medication Instructions MARLEEN:[id number]    Printed on:19 1146   Medication Information                      multivitamin w/minerals (THERA-VIT-M) tablet  Take 1 tablet by mouth daily             OLANZapine (ZYPREXA) 10 MG tablet  Take 1 tablet (10 mg) by mouth At Bedtime             OLANZapine (ZYPREXA) 2.5 MG tablet  Take 1 tablet (2.5 mg) by mouth 3 times daily as needed (paranoia)             sertraline (ZOLOFT) 50 MG tablet  Take 1 tablet (50 mg) by mouth daily               PERLA GR MD             D: 2019   T: 2019   MT:       Name:     JENNINGS GUI   MRN:      [id number]        Account:        [id number]   :      1975           Admit Date:     2019                                  Discharge Date: 2019      Document: [id number]       cc: Allyson Trinidad MD

## 2019-11-13 NOTE — DISCHARGE INSTRUCTIONS
Behavioral Discharge Planning and Instructions  THANK YOU FOR CHOOSING THE [facility]  3A  [phone]    Summary: You were admitted to Station [facility] on 11/7/19  for detoxification from alcohol.  A medical exam was performed that included lab work. You have met with a  and opted to attend treatment at [facility].  Please take care and make your recovery a priority!    Main Diagnosis: Per Dr. Roque Qiu MD;  DIAGNOSES:   Axis I:     1.  Psychosis, not otherwise specified.   2.  Posttraumatic stress disorder.   3.  Alcohol use disorder, severe.   4.  Methamphetamine use disorder, severe.     Recommendation:  Attend a residential or [facility]/ Lodging program and follow all recommendations of your treatment providers      Disposition: [facility]  Admit date/time: 11/13/2019 @ 1 PM      Major Treatments, Procedures and Findings:  You were detoxed from alcohol  using the [facility] protocol.  You have met with a  to develop a treatment plan for discharge.  You have had labs drawn and those results have been reviewed with you. Please take a copy of your lab work with you to your next primary care physician appointment.      Primary Provider:  [facility]    [address] [place] [address]    [phone]   Allyson Muro, [facility]     Please follow up with primary MD  within 30 days for post hospitalization checkup.    Symptoms to Report:  If you experience more anxiety, confusion, sleeplessness, deep sadness or thoughts of suicide, notify your treatment team or notify your primary care physician. IF ANY OF THE SYMPTOMS YOU ARE EXPERIENCING ARE A MEDICAL EMERGENCY CALL 911 IMMEDIATELY.     Lifestyle Adjustment: Adjust your lifestyle to get enough sleep, relaxation, exercise and  good nutrition. Continue to develop healthy coping skills to decrease stress and promote a sober living environment. Do not use alcohol, illegal drugs or  addictive medications other than what is currently prescribed. AA, NA, and  Sponsor are excellent resources for support.     General Medication Instructions:   See your medication sheet(s) for instructions.   Take all medicines as directed.  Make no changes unless your doctor suggests them.   Do not use any drugs not prescribed by your provider.    Avoid alcohol.    DISCHARGE RESOURCES:  -SMART Recovery - self management for addiction recovery:  www.smartrecovery.org    -Pathways ~ A Health Crisis Resource & Support Center: 247.812.6031.  -[facility] [phone]   -[facility] Behavioral Intake 674-046-4232 or 862-517-7857.  -Suicide Awareness Voices of Education (SAVE) (www.save.org): 515-077-FUMO (3534)  -National Suicide Prevention Line (www.mentalhealthmn.org): 544-671-EMAV (3966)  -National [place] on Mental Illness (www.mn.shell.org): 357.246.5963 or 643-857-9799.  -Iwrn6bxyq: text the word LIFE to 73955 for immediate support and crisis intervention  -Mental Health Consumer/Survivor Network of [place] (www.mhcsn.net): 392.885.2385 or 458-496-7475  -Mental Health Association of [place] (www.mentalhealth.org): 198.435.4855 or 018-437-3963     -Substance Abuse and Mental Health Services (www.samhsa.gov)  -Harm Reduction Coalition (www. Harmreduction.org)  -www.prescribetoprevent.org or http://prescribetoprevent.org/video  -Poison control 1-745.610.2724   **[place] Opioid Prevention Coalition: www.opioidcoalition.org    Sober Support Group Information:  AA/NA & Sponsor/Support  -Alcoholics Anonymous (www.alcoholics-anonymous.org): for local information 24 hours/day  -AA Intergroup service office in [place] (http://www.aastpaul.org/) 528.708.6928  -AA Intergroup service office in [facility]: 450.550.7879. (http://www.a[place].org/)  -Narcotics Anonymous (www.nam[place].org) (444) 423-6531   **Sober Fun Activities:  www.sober-activities.[employer]/[facility]//[facility] Recovery Connection ([facility])  [facility] connects people seeking recovery to resources that help foster and sustain long-term recovery.  Whether you are seeking resources for treatment, transportation, housing, job training, education, health care or other pathways to recovery, [facility] is a great place to start.    Phone: 557.706.2744.  www.[place]OP3Nvoice.Prelert (Great listing of all types of recovery and non-recovery related resources)    Any follow up concerns:  Nursing questions call the [address] [phone]  Medical Record call [phone]  Outpatient Behavioral Intake call [phone]  LP+ Wait List/Bed Availability call [phone]    The entire treatment team has appreciated the opportunity to work with you.  We wish you the best in the future and with your lifelong recovery goals. Please bring this discharge folder with you to all follow up appointments.  It contains your lab results, diagnosis, medication list and discharge recommendations.    THANK YOU FOR CHOOSING THE [facility]

## 2019-11-13 NOTE — TELEPHONE ENCOUNTER
"SHILPAAR  Name:   Serenity Jolly   YOB: 1975 Age:  44 year old Gender:  female   Referral Source: Self   Referral GAIL: N/A   Insurance: Blue Cross: [place]/PMAP - No pre-authorization or certification is needed with Blue Cross MA/PMAP for the  program or for [facility] substance abuse treatment programs.    Financial Screening: Financial approval from [facility]'s business office is NOT NEEDED.     Precipitating Event: Treatment due to own awareness of need for help, Treatment due to pressure from family members to get help and Treatment due to pressure from the legal system     DOC: Meth/Amphetamines    Additional abused substances: Marijuana and heroin-no use in over one year     Medical: No chronic health problems     Mental Health: Depression, Anxiety, History of trauma and/or abuse issues, current paranoia, suspicious symptoms and Rule out PTSD due to past abuse     Prior Detox admissions: 3 prior IP detoxification admission(s).    Prior CD treatments: 2 prior CD treatment(s).     Psychosocial history:       2 adult child(alvaro) and 2 minor child(alvaro)    Unhealthy and/or using living environment    Minimal support network, Tendency to isolate from others, Relationship conflict with family members or friends due to substance abuse, Current legal issues, Unemployed and Decreased performance at work or school in part due to substance abuse     [place] Suicide Risk Status:  Past month: 0. - Very Low Risk:  Evaluation Counselors:  Document in Epic / ZeroVMAR to counselor \"Very Low Risk\".      Treatment Counselors:  Reassess upon admission as applicable, assess weekly in progress notes under Dimension 3 and summarize in Discharge / Treatment summary under Dimension 3.    Past 24 hours: 0. - Very Low Risk:  Evaluation Counselors:  Document in Epic / ZeroVMAR to counselor \"Very Low Risk\".      Treatment Counselors:  Reassess upon admission as applicable, assess weekly in progress notes under Dimension 3 " and summarize in Discharge / Treatment summary under Dimension 3.     Additional Info as needed: There was no additional information to provide at this time.

## 2019-11-13 NOTE — PROGRESS NOTES
Pt verbalized complete understanding of all discharge instructions. Pt discharged to   [facility] escorted by staff.

## 2019-11-13 NOTE — PROGRESS NOTES
Name: Serenity Jolly  Date: 11/13/2019  Medical Record: [id number]    Envelope Number: [id number]    List of Contents (List each item separately in new row):   1 Bottle of prenatal vitamins  1 Bag of assorted creams  1 Bag of Pepto Bismal/Glycerin Tabs.      Admission:  I am responsible for any personal items that are not sent to the safe or pharmacy.  [facility] is not responsible for loss, theft or damage of any property in my possession.      Patient Signature:  ___________________________________________       Date/Time:__________________________    Staff Signature: __________________________________       Date/Time:__________________________    [facility] Staff person, if patient is unable/unwilling to sign:      __________________________________________________________       Date/Time: __________________________      Discharge:  [facility] has returned all of my personal belongings:    Patient Signature: ________________________________________     Date/Time: ____________________________________    Staff Signature: ______________________________________     Date/Time:_____________________________________
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
## 2019-11-13 NOTE — PROGRESS NOTES
[facility] Nursing Health Assessment      Vital signs: 3a    There were no vitals taken for this visit.      Transfer from 3a    Counselor: Kaela  Drug of Choice: Meth, alcohol, benzos  Last use: 11/5/2019  Home clinic/MD: DR. Mars, [facility]  Psychiatrist/therapist: none    Medical history/current conditions:  none    H&P Screen:  H&P within the last 90 days: Yes.  Date: November 2019 Location: 3a      Mental Health diagnosis: PTSD, depression anxiety  Medication compliant?: yes  Recent sucidal thoughts? no     When? n/a  Current thought of self-harm? no    Plan? n/a    Pain assessment:   Pt. Experiencing pain at this time?  Yes.  Rating on 0-10 scale: (1-10 scale): 6.  Location: Lower back  Chronic  Result of: Undetermined.       Nursing Assessment Summary:  Patient reports lethargy and low energy at admission.     On-going nursing intervention required?   No    Acute care visit recommended: no

## 2019-11-13 NOTE — PROGRESS NOTES
Progress Note    This patient had a Assessment and Placement Summary Update on 11/12/2019 completed by JONG Self.  This patient was seen for a face to face update of the Assessment and Placement Summary Update on 11/13/2019 by JONG Choudhary.  INSIDE: The patient's Assessment and Placement Summary Update completed on 11/12/2019 is in the patient's electronic medical record in Epic in the Chart Review section under the Notes/Trans Tab.    Alcohol/Drug use since the last CD evaluation (include date of last use):     No additional substances use since the last CD evaluation     Please note any other clinical changes since the last CD evaluation (such as medication changes, additional legal charges, detoxification admissions, overdoses, etc.)     No significant changes since the last CD evaluation       ASAM Dimensions Original scores Current Scores   I.) Intoxication and Withdrawal: 0 0   II.) Biomedical:  0 0   III.) Emotional and Behavioral:  2 2   IV.) Readiness to Change:  2 2   V.) Relapse Potential: 4 4   VI.) Recovery Environmental: 3 3     Please list clinical justifications for the above ASAM score changes since the original comprehensive assessment:     None of the ASAM scores on the six dimensions had changed since the Assessment and Placement Summary Update was completed on 11/12/2019.       Current ABBY: Current UA:     No ABBY as the patient was a direct transfer from 3 A IP detoxification unit at [facility] in [place], [place].     No UA screen as the patient was a direct transfer from 3 A IP detoxification unit at [facility] in [place], [place].        PHQ-9, ZACHERY-7   PHQ-9 on 11/13/2019 ZACHERY-7 on 11/13/2019   The patient's PHQ-9 score was 13 out of 27, indicating moderate depression.   The patient's ZACHERY-7 score was 21 out of 21, indicating severe anxiety.       [place]-Suicide Severity Rating Scale Reassessment   Have you ever wished you were dead or that you could  go to sleep and not wake up?  Past Month:  No     Have you actually had any thoughts of killing yourself?  Past Month:  No     Have you been thinking about how you might do this?     Past Month:  No   Lifetime:  No   Have you had these thoughts and had some intention of acting on them?     Past Month:  No   Lifetime:  No   Have you started to work out the details of how to kill yourself?   Past Month:  No   Lifetime:  No   Do you intend to carry out this plan?   No     When you have the thoughts how long do they last?  The patient denied ever having any suicidal thoughts in life.     Are there things - anyone or anything (i.e. family, Synagogue, pain of death) that stopped you from wanting to die or acting on thoughts of suicide?  Does not apply       2008  [employer]  Used with permission by Nadia Grande, PhD.       Guide to C-SSRS Risk Ratings   NO IDEATION:  with no active thoughts IDEATION: with a wish to die. IDEATION: with active thoughts. Risk Ratings   If Yes No No 0 - Very Low Risk   If NA Yes No 1 - Low Risk   If NA Yes Yes 2 - Low/moderate risk   IDEATION: associated thoughts of methods without intent or plan INTENT: Intent to follow through on suicide PLAN: Plan to follow through on suicide Risk Ratings cont...   If Yes No No 3 - Moderate Risk   If Yes Yes No 4 - High Risk   If Yes Yes Yes 5 - High Risk   The patient's ADDITIONAL RISK FACTORS and lack of PROTECTIVE FACTORS may increase their overall suicide risk ratings.     Additional Risk Factors:    Someone close to the patient (family member/friend) completed a suicide     Significant history of having untreated or poorly treated mental health symptoms     A recent loss that was significant to the patient, i.e. loss of job, loss of home, divorce, break-up, etc.     Significant history of trauma and/or abuse issues     A triggering event(s) leading to humiliation, shame or despair     Significant legal problems  "including being at risk of incarceration   Protective Factors:    Having people in his/her life that would prevent the patient from considering a suicide attempt (i.e. young children, spouse, parents, etc.)     An absence of chronic health problems or stable and well treated chronic health issues     Having easy access to supportive family members     Having a good community support network     Having cultural, Latter day or spiritual beliefs that discourage suicide     Having restricted access to highly lethal means of suicide     Risk Status   0. - Very Low Risk:  Evaluation Counselors:  Document in Epic / SBAR to counselor \"Very Low Risk\".      Treatment Counselors:  Reassess upon admission as applicable, assess weekly in progress notes under Dimension 3 and summarize in Discharge / Treatment summary under Dimension 3.     Additional information to support suicide risk rating: There was no additional information to provide at this time.     "

## 2019-11-13 NOTE — PROGRESS NOTES
Initial Services Plan        Service Initiation Date: 11/13/2019    Immediate health and/or safety concerns: No    Identify health and safety concern(s) below and include plan to address:    None Identified    Treatment suggestions for client during the time between intake (admit date) and completion of the individual treatment plan:     Look for a sober support network, i.e. 12 step, [facility], [facility], etc  Tour the treatment center or outpatient clinic  Introduce yourself to your treatment group. Spend time getting to know your peers  Review your patient or client handbook  Begin working on your treatment goal list    Completed by: JONG Choudhary  Date completed: 11/13/2019 at 12:21 PM

## 2019-11-13 NOTE — TELEPHONE ENCOUNTER
----- Message from Jacqueline Buchanan sent at 11/13/2019 11:45 AM CST -----  Regarding: SCHEDULED LP ADMISISON  [facility]. Scheduled admission for 11/13/19.    Thanks,    Jacqueline Buchanan

## 2019-11-13 NOTE — TELEPHONE ENCOUNTER
This patient was admitted to the [facility] program on 11/13/2019.  Please see the SBAR in the telephone note below completed by JONG Self for details on this patient.

## 2019-11-14 ENCOUNTER — HOSPITAL ENCOUNTER (OUTPATIENT)
Dept: BEHAVIORAL HEALTH | Facility: CLINIC | Age: 44
End: 2019-11-14
Attending: FAMILY MEDICINE
Payer: COMMERCIAL

## 2019-11-14 PROCEDURE — 10020000 ZZH LODGING PLUS FACILITY CHARGE ADULT

## 2019-11-14 PROCEDURE — H2035 A/D TX PROGRAM, PER HOUR: HCPCS | Mod: HQ

## 2019-11-14 PROCEDURE — H2035 A/D TX PROGRAM, PER HOUR: HCPCS

## 2019-11-14 ASSESSMENT — ANXIETY QUESTIONNAIRES: GAD7 TOTAL SCORE: 21

## 2019-11-15 ENCOUNTER — HOSPITAL ENCOUNTER (OUTPATIENT)
Dept: BEHAVIORAL HEALTH | Facility: CLINIC | Age: 44
End: 2019-11-15
Attending: FAMILY MEDICINE
Payer: COMMERCIAL

## 2019-11-15 VITALS
RESPIRATION RATE: 16 BRPM | OXYGEN SATURATION: 98 % | HEART RATE: 85 BPM | DIASTOLIC BLOOD PRESSURE: 71 MMHG | SYSTOLIC BLOOD PRESSURE: 119 MMHG | TEMPERATURE: 96 F

## 2019-11-15 DIAGNOSIS — F19.20 CHEMICAL DEPENDENCY (H): Primary | ICD-10-CM

## 2019-11-15 PROCEDURE — 10020000 ZZH LODGING PLUS FACILITY CHARGE ADULT

## 2019-11-15 PROCEDURE — H2035 A/D TX PROGRAM, PER HOUR: HCPCS

## 2019-11-15 PROCEDURE — H2035 A/D TX PROGRAM, PER HOUR: HCPCS | Mod: HQ

## 2019-11-15 RX ORDER — ARIPIPRAZOLE 2 MG/1
2 TABLET ORAL 2 TIMES DAILY PRN
Qty: 60 TABLET | Refills: 0 | Status: SHIPPED | OUTPATIENT
Start: 2019-11-15 | End: 2019-12-21

## 2019-11-15 RX ORDER — SERTRALINE HYDROCHLORIDE 100 MG/1
100 TABLET, FILM COATED ORAL DAILY
Qty: 30 TABLET | Refills: 0 | Status: ON HOLD | OUTPATIENT
Start: 2019-11-15 | End: 2019-12-26

## 2019-11-15 NOTE — CONSULTS
Pt seen for initial psych consult for 60 min  Blood pressure 119/71, pulse 85, temperature 96  F (35.6  C), temperature source Axillary, resp. rate 16, SpO2 98 %.

## 2019-11-15 NOTE — PROGRESS NOTES
Name: Serenity Jolly  Date: 11/15/2019  Medical Record: [id number]    Envelope Number: [id number]    List of Contents (List each item separately in new row):   1 bottle Sertraline HCl 50 mg tablets    Admission:  I am responsible for any personal items that are not sent to the safe or pharmacy.  [facility] is not responsible for loss, theft or damage of any property in my possession.      Patient Signature:  ___________________________________________       Date/Time:__________________________    Staff Signature: __________________________________       Date/Time:__________________________    2nd Staff person, if patient is unable/unwilling to sign:      __________________________________________________________       Date/Time: __________________________      Discharge:  [facility] has returned all of my personal belongings:    Patient Signature: ________________________________________     Date/Time: ____________________________________    Staff Signature: ______________________________________     Date/Time:_____________________________________

## 2019-11-15 NOTE — CONSULTS
Consult Date:  11/15/2019      INITIAL PSYCHIATRIC CONSULTATION      REASON FOR CONSULTATION:  Severe depression, craving for dual substances.      REQUESTING PHYSICIAN:  Dr. Nile Gomez      IDENTIFYING INFORMATION:  The patient is a 44-year-old  female.  She is .  She has 3 children.  She is on probation.  She was discharged from  on 11/13 and came to [facility].  Since she has been here, she has been struggling with anxiety.      The patient describes that she feels her PTSD more.  She is having nightmares, flashbacks, trust issues, and is jumpy.  She is also having a lot of anxiety.  She feels her heart rate is racing.  She wants to get out.  Feels like there is a heavy weight on her chest.  She also is feeling depressed, isolative, hopeless, helpless, worthless.      She describes that she feels like she is part of a show, everybody has a script, and she is there going by the script and has delusions of reference, delusions of control and delusions of persecution.  She is on Zyprexa 10 mg at bedtime and 2.5 mg 3 times a day; however, she does not take the p.r.n. medications during the day.  She does not have any suicidal ideation, plan or intent.  She says that she has kids and she wants to live.  She does not have any homicidal ideation.      The patient's drug of choice is alcohol, and she is also using amphetamines.  Her U-tox was positive for it.  She was previously on methadone maintenance but got off of it.  She has tolerance to these drugs, withdrawal from them, and progressive use with loss of control.  She has tried to quit unsuccessfully.      PAST PSYCHIATRIC HISTORY:  She was never psychiatrically hospitalized.  She was in several treatments including [facility] in 2015 and 2017 and [facility].  She was at a methadone clinic as described above.  In 2010, she tried to overdose on pills.      PAST MEDICAL HISTORY:  Ten-point review of systems reviewed and is negative.   The patient's vitals are noted in the chart.      FAMILY HISTORY:  Brother has a history of using heroin and marijuana.      SOCIAL HISTORY:  She was born in [place] and lived in [place].  She is an extremely poor historian.      MENTAL STATUS EXAMINATION:  The patient is a 44-year-old  female who appears older than his stated age.  She has adequate grooming and adequate hygiene.  Maintains good eye contact.  , She is cooperative somewhat but still completely paranoid and suspicious.  Mood is anxious.  Affect is congruent.  Speech is less spontaneous, reduced in volume.  Less logical in thinking, no loose associations.  Insight and judgment are limited.  Alert, oriented x 3.  Recent and remote memory, language, and fund of knowledge are all adequate.  Denied any active suicidal or homicidal ideation, denied auditory or visual hallucinations.  She has delusions of persecutions, delusions of reference.      DIAGNOSES:   Axis I:   1. Posttraumatic stress disorder.   2. Psychosis, not otherwise specified.   3. Alcohol use disorder.   4. Methamphetamine use disorder.      RECOMMENDATIONS:  Increase Zoloft to 100 mg.  Continue Zyprexa 10 mg.  The patient will be started on Abilify 2 mg twice a day as needed for her psychotic agitation.  She is willing to stay in treatment and wants to do treatment.  She will be seen again on Tuesday. Denied any active suicidal /homicidal ideation plan intent                 PERLA GR MD             D: 11/15/2019   T: 11/15/2019   MT: ANAMIKA      Name:     GUI JENNINGS   MRN:      [id number]        Account:       [id number]   :      1975           Consult Date:  11/15/2019      Document: [id number]       cc: ZACHERY TOWNSEND MD

## 2019-11-16 ENCOUNTER — HOSPITAL ENCOUNTER (OUTPATIENT)
Dept: BEHAVIORAL HEALTH | Facility: CLINIC | Age: 44
End: 2019-11-16
Attending: FAMILY MEDICINE
Payer: COMMERCIAL

## 2019-11-16 PROCEDURE — 10020000 ZZH LODGING PLUS FACILITY CHARGE ADULT

## 2019-11-16 PROCEDURE — H2035 A/D TX PROGRAM, PER HOUR: HCPCS | Mod: HQ

## 2019-11-16 NOTE — PROGRESS NOTES
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 Patient was seen by psychiatry to address psychosis related to post-acute withdrawal. Patient is able to participate in all treatment programming.    Dimension Two: Biomedical Condition and Complications    Ratin   (Consider the degree to which any physical disorder would interfere with treatment for substance abuse, and the client's ability to tolerate any related discomfort; determine the impact of continued substance use on the unborn child if the client is pregnant)     Patient denies any biomedical concerns that would interfere with treatment participation.Patient is able to seek medical services as needed independently.     Dimension Three: Emotional/Behavioral/Cognitive Conditions & Complications  Ratin  (Determine the degree to which any condition or complications are likely to interfere with treatment for substance abuse or with functioning in significant life areas and the likelihood of risk of harm to self or others)     Patient is currently prescribed medications to manage symptoms of psychosis and PTSD. Patient's sister reported to staff that patient has become increasingly paranoid from continued methamphetamine abuse. Patient appears to have poor insight into how her mental health and substance use are related. Patient's PHQ-9 score was 12/27, indicating moderate depression; her ZACHERY-7 score was 14/21  "indicating moderate anxiety. Patient completed a suicide risk assessment and was rated \"very low-risk.\" Patient denies any current suicidal ideation or thoughts of self-harm.      Dimension Four: Treatment Acceptance/Resistance     Ratin  (Consider the amount of support and encouragement necessary to keep the client involved in treatment)  Patient reports she is unsure if she plans to complete treatment. Patient needs continued encouragement from treatment staff due to current paranoia. Patient appears to be in the contemplation stage of change at this time.     Dimension Five: Continued Use/Relaspe Prevention     Ratin  (Consider the degree to which the client's recognizes relapse issues and has the skills to prevent relapse of either substance use or mental health problems)     Patient reports completing three prior treatments. Patient appears to have limited insight into her relapse patterns or coping skills to address relapse prevention.     Dimension Six: Recovery Environment     Rating: 3  (Consider the degree to which key areas of the client's life are supportive of or antagonistic to treatment participation and recovery)     Patient reports she has been living with her mom and verbalizes this is a safe environment. Patient has support from her sister, she has minimal support within the recovery community. Patient has legal involvement in [facility].     I have reviewed the information on the assessment, psychosocial and medical history and checklist:        it is current  "

## 2019-11-17 ENCOUNTER — HOSPITAL ENCOUNTER (OUTPATIENT)
Dept: BEHAVIORAL HEALTH | Facility: CLINIC | Age: 44
End: 2019-11-17
Attending: FAMILY MEDICINE
Payer: COMMERCIAL

## 2019-11-17 PROCEDURE — H2035 A/D TX PROGRAM, PER HOUR: HCPCS | Mod: HQ

## 2019-11-17 PROCEDURE — 10020000 ZZH LODGING PLUS FACILITY CHARGE ADULT

## 2019-11-17 NOTE — PROGRESS NOTES
Pt came to this counselor, reporting she wanted to leave. She reports the benefits of leaving are getting a job and paying her bills. Pt's boyfriend is visiting, she was provided with a list of resources, making her aware the assessment recommended a lodging program and when probation recommends an assessment, most times one need's to follow recommendations of evaluator.  Pt decided to go outside with boyfriend to smoke before making a final decision. She returned reporting she was leaving and boyfriend appears to support her, when asked if he is in agreement. He replied he is aware of what happens if she parties and if she doesn't.   Nurse notified, she met with LP staff to request items from security. Pt will wait for items on 6th floor and discharge when they arrive.

## 2019-11-21 NOTE — ADDENDUM NOTE
Encounter addended by: Nadia Ramirez [facility] on: 11/21/2019 1:57 PM   Actions taken: Clinical Note Signed

## 2019-11-21 NOTE — PROGRESS NOTES
CHEMICAL DEPENDENCY DISCHARGE SUMMARY    PATIENT NAME: Serenity Jolly    MRN; [id number]   : 1975      EVALUATION COUNSELOR: Jesusita Orozco LADC: This patient had a Rule 25 Assessment on 10/14/19 at [facility] completed by JONG Ventura.      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   LAST USE DATE: Pt reports last date of use as 2019  DAYS OF TREATMENT COMPLETED: 3    PRESENTING INFORMATION:Serenity Jolly is a 44 year old female who speaks English and lives in a home unknown The patient arrived in the ED by private car from home with a complaint of Withdrawal (seeking detox from [facility] - does use meth and alcohol)  .The patient's current symptoms started/worsened 2 day(S) ago and during this time the symptoms have remained the same.    SERVICES PROVIDED:  Our services included assessment, treatment planning and education regarding chemical dependency, mental illness, relationships, and relapse prevention.  The patient also participated in individual therapy, group therapy, recovery oriented workshops, spiritual care counseling, recovery skills training and aftercare planning.    ISSUES ADDRESS IN TREATMENT: Patient was not in treatment programming long enough for counseling staff to complete her treatment plan. Patient was discharged prior to meeting with primary counselor s to discuss treatment goals.This patient had a Assessment and Placement Summary Update on 2019 completed by JONG Self. She  "patientwas seen for a face to face update of the Assessment and Placement Summary Update on 2019 by JONG Choudhary.     DIMENSION 1 - ACUTE INTOXICATION/WITHDRAWAL POTENTIAL  ADMISSION RISK RATIN  DISCHARGE RISK RATIN  Patient reports her last date of use was on 19. Patient was seen by psychiatry to address psychosis related to post-acute withdrawal. Patient is able to participate in all treatment programming.       DIMENSION 2 - BIOMEDICAL COMPLICATIONS AND CONDITIONS  ADMISSION RISK RATIN  DISCHARGE RISK RATIN  Patient denies any biomedical concerns that would interfere with treatment participation.Patient is able to seek medical services as needed independently.     DIMENSION 3 - EMOTIONAL, BEHAVIORAL, COGNITIVE CONDITIONS AND COMPLICATIONS  ADMISSION RISK RATIN  DISCHARGE RISK RATIN  Patient is currently prescribed medications to manage symptoms of psychosis and PTSD. Patient's sister reported to staff that patient has become increasingly paranoid from continued methamphetamine abuse. Patient appears to have poor insight into how her mental health and substance use are related. Patient's PHQ-9 score was 12/27, indicating moderate depression; her ZACHERY-7 score was 14/21 indicating moderate anxiety. Patient completed a suicide risk assessment and was rated \"very low-risk.\" Patient denies any current suicidal ideation or thoughts of self-harm.      DIMENSION 4 - READINESS FOR CHANGE  ADMISSION RISK RATIN  DISCHARGE RISK RATIN  Patient reports she is unsure if she plans to complete treatment. Patient needs continued encouragement from treatment staff due to current paranoia. Patient appears to be in the contemplation stage of change at this time.        DIMENSION 5 - RELAPSE, CONTINUED USE AND CONTINUED PROBLEM POTENTIAL   ADMISSION RISK RATIN  DISCHARGE RISK RATIN  Patient reports completing three prior treatments. Patient appears to have limited insight " into her relapse patterns or coping skills to address relapse prevention.     DIMENSION 6 - RECOVERY ENVIRONMENT  ADMISSION RISK RATING:3  DISCHARGE RISK RATING:3  Patient reports she has been living with her mom and verbalizes this is a safe environment. Patient has support from her sister, she has minimal support within the recovery community. Patient has legal involvement in [facility].     STRENGTHS: Unknown.    LIVING ARRANGEMENTS AT DISCHARGE: Unknown    PROGNOSIS: Patient has an unfavorable prognosis due to non-compliance with treatment programming requirements. This may be upgraded if she follows all aftercare recommendations.     CONTINUING CARE RECOMMENDATIONS AND REFERRALS:    1.     Seek evaluation for [facility] long-term residential treatment program.   2.     Remain abstinent from all mood altering chemicals.  3.     Attend a minimum of two 12-step meetings weekly.  4.     Follow all recommendations of primary healthcare provider.  5.     Continue to practice coping skills for emotional and chemical health relapse prevention.     I have reviewed the information on the assessment, psychosocial and medical history and checklist:        it is current    JONG Kam

## 2019-12-21 ENCOUNTER — HOSPITAL ENCOUNTER (INPATIENT)
Facility: CLINIC | Age: 44
LOS: 5 days | Discharge: HOME | End: 2019-12-26
Attending: EMERGENCY MEDICINE | Admitting: PSYCHIATRY & NEUROLOGY
Payer: COMMERCIAL

## 2019-12-21 DIAGNOSIS — F19.20 CHEMICAL DEPENDENCY (H): ICD-10-CM

## 2019-12-21 DIAGNOSIS — F15.10 METHAMPHETAMINE USE (H): ICD-10-CM

## 2019-12-21 DIAGNOSIS — F29 PSYCHOSIS, UNSPECIFIED PSYCHOSIS TYPE (H): ICD-10-CM

## 2019-12-21 DIAGNOSIS — F41.1 GENERALIZED ANXIETY DISORDER: ICD-10-CM

## 2019-12-21 LAB
ALCOHOL BREATH TEST: 0 (ref 0–0.01)
AMPHETAMINES UR QL SCN: POSITIVE
BARBITURATES UR QL: NEGATIVE
BENZODIAZ UR QL: NEGATIVE
CANNABINOIDS UR QL SCN: NEGATIVE
COCAINE UR QL: NEGATIVE
ETHANOL UR QL SCN: NEGATIVE
HCG UR QL: NEGATIVE
OPIATES UR QL SCN: NEGATIVE

## 2019-12-21 PROCEDURE — 25000132 ZZH RX MED GY IP 250 OP 250 PS 637: Performed by: EMERGENCY MEDICINE

## 2019-12-21 PROCEDURE — 99285 EMERGENCY DEPT VISIT HI MDM: CPT | Mod: 25 | Performed by: EMERGENCY MEDICINE

## 2019-12-21 PROCEDURE — 80320 DRUG SCREEN QUANTALCOHOLS: CPT | Performed by: EMERGENCY MEDICINE

## 2019-12-21 PROCEDURE — 81025 URINE PREGNANCY TEST: CPT | Performed by: EMERGENCY MEDICINE

## 2019-12-21 PROCEDURE — 12400001 ZZH R&B MH UMMC

## 2019-12-21 PROCEDURE — 80307 DRUG TEST PRSMV CHEM ANLYZR: CPT | Performed by: EMERGENCY MEDICINE

## 2019-12-21 PROCEDURE — 90791 PSYCH DIAGNOSTIC EVALUATION: CPT

## 2019-12-21 PROCEDURE — HZ2ZZZZ DETOXIFICATION SERVICES FOR SUBSTANCE ABUSE TREATMENT: ICD-10-PCS | Performed by: PSYCHIATRY & NEUROLOGY

## 2019-12-21 PROCEDURE — 25000132 ZZH RX MED GY IP 250 OP 250 PS 637: Performed by: PSYCHIATRY & NEUROLOGY

## 2019-12-21 PROCEDURE — 99285 EMERGENCY DEPT VISIT HI MDM: CPT | Mod: Z6 | Performed by: EMERGENCY MEDICINE

## 2019-12-21 RX ORDER — HYDROXYZINE HYDROCHLORIDE 25 MG/1
25 TABLET, FILM COATED ORAL EVERY 4 HOURS PRN
Status: DISCONTINUED | OUTPATIENT
Start: 2019-12-21 | End: 2019-12-26 | Stop reason: HOSPADM

## 2019-12-21 RX ORDER — ALUMINA, MAGNESIA, AND SIMETHICONE 2400; 2400; 240 MG/30ML; MG/30ML; MG/30ML
30 SUSPENSION ORAL EVERY 4 HOURS PRN
Status: DISCONTINUED | OUTPATIENT
Start: 2019-12-21 | End: 2019-12-26 | Stop reason: HOSPADM

## 2019-12-21 RX ORDER — TRAZODONE HYDROCHLORIDE 50 MG/1
50 TABLET, FILM COATED ORAL
Status: DISCONTINUED | OUTPATIENT
Start: 2019-12-21 | End: 2019-12-26 | Stop reason: HOSPADM

## 2019-12-21 RX ORDER — OLANZAPINE 2.5 MG/1
2.5 TABLET, FILM COATED ORAL 3 TIMES DAILY PRN
Status: DISCONTINUED | OUTPATIENT
Start: 2019-12-21 | End: 2019-12-26 | Stop reason: HOSPADM

## 2019-12-21 RX ORDER — OLANZAPINE 10 MG/1
10 TABLET ORAL AT BEDTIME
Status: DISCONTINUED | OUTPATIENT
Start: 2019-12-21 | End: 2019-12-22

## 2019-12-21 RX ORDER — BISACODYL 10 MG
10 SUPPOSITORY, RECTAL RECTAL DAILY PRN
Status: DISCONTINUED | OUTPATIENT
Start: 2019-12-21 | End: 2019-12-26 | Stop reason: HOSPADM

## 2019-12-21 RX ORDER — OLANZAPINE 10 MG/1
10 TABLET, ORALLY DISINTEGRATING ORAL ONCE
Status: COMPLETED | OUTPATIENT
Start: 2019-12-21 | End: 2019-12-21

## 2019-12-21 RX ORDER — LORATADINE 10 MG/1
10 TABLET ORAL DAILY PRN
Status: DISCONTINUED | OUTPATIENT
Start: 2019-12-21 | End: 2019-12-26 | Stop reason: HOSPADM

## 2019-12-21 RX ORDER — MULTIPLE VITAMINS W/ MINERALS TAB 9MG-400MCG
1 TAB ORAL DAILY
Status: DISCONTINUED | OUTPATIENT
Start: 2019-12-22 | End: 2019-12-26 | Stop reason: HOSPADM

## 2019-12-21 RX ORDER — LANOLIN ALCOHOL/MO/W.PET/CERES
3 CREAM (GRAM) TOPICAL
Status: DISCONTINUED | OUTPATIENT
Start: 2019-12-21 | End: 2019-12-26 | Stop reason: HOSPADM

## 2019-12-21 RX ORDER — ARIPIPRAZOLE 5 MG/1
2.5 TABLET ORAL 2 TIMES DAILY
Status: ON HOLD | COMMUNITY
End: 2019-12-26

## 2019-12-21 RX ORDER — ACETAMINOPHEN 325 MG/1
650 TABLET ORAL EVERY 4 HOURS PRN
Status: DISCONTINUED | OUTPATIENT
Start: 2019-12-21 | End: 2019-12-26 | Stop reason: HOSPADM

## 2019-12-21 RX ADMIN — OLANZAPINE 10 MG: 10 TABLET, ORALLY DISINTEGRATING ORAL at 12:54

## 2019-12-21 RX ADMIN — OLANZAPINE 10 MG: 10 TABLET, ORALLY DISINTEGRATING ORAL at 02:58

## 2019-12-21 RX ADMIN — OLANZAPINE 10 MG: 10 TABLET, FILM COATED ORAL at 21:09

## 2019-12-21 ASSESSMENT — MIFFLIN-ST. JEOR: SCORE: 1297.7

## 2019-12-21 ASSESSMENT — ACTIVITIES OF DAILY LIVING (ADL)
DRESS: 0-->INDEPENDENT
BATHING: 0-->INDEPENDENT
TRANSFERRING: 0-->INDEPENDENT
RETIRED_COMMUNICATION: 0-->UNDERSTANDS/COMMUNICATES WITHOUT DIFFICULTY
AMBULATION: 0-->INDEPENDENT
SWALLOWING: 0-->SWALLOWS FOODS/LIQUIDS WITHOUT DIFFICULTY
RETIRED_EATING: 0-->INDEPENDENT
TOILETING: 0-->INDEPENDENT
COGNITION: 0 - NO COGNITION ISSUES REPORTED
FALL_HISTORY_WITHIN_LAST_SIX_MONTHS: NO

## 2019-12-21 NOTE — ED NOTES
Pt. was seen by DEC  for a re assessment. Pt. is alert to self and place. She kept the blanket in front of her mouth and had to repeatedly  asked to move it so she could be understood. Pt. states she does not remember last night beyond drinking alcohol. Pt. is still very tired, she had a difficult time participating in the assessment, not understanding the questions or needed them repeated. She denies any suicidal thoughts, can not respond about symptoms of psychosis. She could not confirm other drugs, such as the suspected meth/ampheatimine. She could not identify how much alcohol she has been drinking recently. Pt. states she is experiences symptoms of withdrawal but is not able to describe any. Pt. asked if her family was here, and then states she wants to talk to them and go home with them. She agreed to detox at one point, then changed her mind. She also asked to be sent to another hospital, and when asked why, she responded with to be with her family.     At this time pt.'s judgment and insight appears impaired, and she is unable to plan a discharge. She wants to talk to her family, but could not plan a call. Pt. may need to be seen again later, but if she is able to contact family and they are willing to take her home, that would be acceptable. If she continues to experience confusion and withdrawal symptoms, she may need detox.     1500 Pt.'s sister, Prerna Osborn, [phone] called for an update on pt. She shares that pt. Has been paranoid, that she believes that people are trying to kill her, that she does not recognize her family when they are talking to her, and can not accept they are who they say they are. Following her recent discharge she reverted back to the drug use within the  Week, and that she has not been in her right self since she left. The parents and children are fearful of her in the home, and that the parents will not come and get her, as they want her to get help again.      Case consultation was completed with Dr. Major, pt. Continues to be psychotic in the . The recommendation if for admission to inpatient mental health.

## 2019-12-21 NOTE — ED NOTES
ED to Behavioral Floor Handoff    SITUATION  Serenity Jolly is a 44 year old female who speaks English and lives in a home with family members The patient arrived in the ED by ambulance from home with a complaint of Paranoid (History of meth use) and Anxiety  .The patient's current symptoms started/worsened 1 month(s) ago and during this time the symptoms have increased.   In the ED, pt was diagnosed with   Final diagnoses:   Psychosis, unspecified psychosis type (H)   Methamphetamine use (H)        Initial vitals were: BP: (!) 137/92  Pulse: 99  Temp: 95.4  F (35.2  C)  Resp: 16  SpO2: 100 %   --------  Is the patient diabetic? No   If yes, last blood glucose? --     If yes, was this treated in the ED? --  --------  Is the patient inebriated (ETOH) No or Impaired on other substances? No  MSSA done? N/A  Last MSSA score: --    Were withdrawal symptoms treated? N/A  Does the patient have a seizure history? No. If yes, date of most recent seizure--  --------  Is the patient patient experiencing suicidal ideation? denies current or recent suicidal ideation     Homicidal ideation? denies current or recent homicidal ideation or behaviors.    Self-injurious behavior/urges? denies current or recent self injurious behavior or ideation.  ------  Was pt aggressive in the ED No  Was a code called No  Is the pt now cooperative? Yes  -------  Meds given in ED:   Medications   OLANZapine zydis (zyPREXA) ODT tab 10 mg (10 mg Oral Given 12/21/19 0258)   OLANZapine zydis (zyPREXA) ODT tab 10 mg (10 mg Oral Given 12/21/19 1254)      Family present during ED course? No  Family currently present? No    BACKGROUND  Does the patient have a cognitive impairment or developmental disability? No  Allergies:   Allergies   Allergen Reactions     Sulfa Drugs Hives   .   Social demographics are   Social History     Socioeconomic History     Marital status:      Spouse name: None     Number of children: None     Years of education:  None     Highest education level: None   Occupational History     None   Social Needs     Financial resource strain: None     Food insecurity:     Worry: None     Inability: None     Transportation needs:     Medical: None     Non-medical: None   Tobacco Use     Smoking status: Current Every Day Smoker     Packs/day: 0.50     Last attempt to quit: 2019     Years since quittin.9     Smokeless tobacco: Never Used     Tobacco comment: Declines NRT at admission   Substance and Sexual Activity     Alcohol use: No     Comment: socially     Drug use: Yes     Types: IV, Methamphetamines     Sexual activity: Yes     Partners: Male     Birth control/protection: Pill   Lifestyle     Physical activity:     Days per week: None     Minutes per session: None     Stress: None   Relationships     Social connections:     Talks on phone: None     Gets together: None     Attends Quaker service: None     Active member of club or organization: None     Attends meetings of clubs or organizations: None     Relationship status: None     Intimate partner violence:     Fear of current or ex partner: None     Emotionally abused: None     Physically abused: None     Forced sexual activity: None   Other Topics Concern     Parent/sibling w/ CABG, MI or angioplasty before 65F 55M? Not Asked   Social History Narrative    ** Merged History Encounter **             ASSESSMENT  Labs results   Labs Ordered and Resulted from Time of ED Arrival Up to the Time of Departure from the ED   ALCOHOL BREATH TEST POCT - Normal   DRUG ABUSE SCREEN 6 CHEM DEP URINE ([facility])   HCG QUALITATIVE URINE      Imaging Studies: No results found for this or any previous visit (from the past 24 hour(s)).   Most recent vital signs /65   Pulse 77   Temp 98.1  F (36.7  C) (Oral)   Resp 18   SpO2 100%    Abnormal labs/tests/findings requiring intervention:---   Pain control: pt had none  Nausea control: pt had none    RECOMMENDATION  Are any infection  precautions needed (MRSA, VRE, etc.)? No If yes, what infection? --  ---  Does the patient have mobility issues? independently. If yes, what device does the pt use? ---  ---  Is patient on 72 hour hold or commitment? No If on 72 hour hold, have hold and rights been given to patient? N/A  Are admitting orders written if after 10 p.m. ?N/A  Tasks needing to be completed:---     Karla Mcintyre RN   [facility]-- [phone] [place] [facility]   [phone] [facility]

## 2019-12-21 NOTE — ED NOTES
"Pt not able to understand commands such as using phone to call or urinate in cup for Utox and preg test. Pt states she will leave, writer explained it was cold outside, pt stated \"its fine, I am warm enough.\" without any socks or shoes.    "

## 2019-12-21 NOTE — ED PROVIDER NOTES
"  History     Chief Complaint   Patient presents with     Paranoid     History of meth use     Anxiety     HPI  Serenity Jolly is a 44 year old female with history of psychosis and substance abuse including methamphetamine and alcohol use who presents by ambulance awake and alert but with severely disorganized thought and bizarre behavior. She paces and repeatedly says \"nothing is real\". She does admit to use of methamphetamines IV. She is not able to provide any other reliable coherent history at this time.     I have reviewed the Medications, Allergies, Past Medical and Surgical History, and Social History in the [facility] system.  Past Medical History:   Diagnosis Date     Anxiety      Cerebral infarction (H)     2 heart attacks 4/2016     Depressive disorder      Myocardial infarction (H)      Substance abuse (H)        Review of Systems   Unable to perform ROS: Psychiatric disorder       Physical Exam   BP: (!) 137/92  Pulse: 99  Temp: 95.4  F (35.2  C)  Resp: 16  SpO2: 100 %      Physical Exam  Vitals signs and nursing note reviewed.   Constitutional:       Appearance: Normal appearance. She is not ill-appearing or toxic-appearing.   HENT:      Head: Normocephalic and atraumatic.      Nose: Nose normal.      Mouth/Throat:      Mouth: Mucous membranes are moist.      Pharynx: Oropharynx is clear.   Eyes:      Extraocular Movements: Extraocular movements intact.      Conjunctiva/sclera: Conjunctivae normal.      Pupils: Pupils are equal, round, and reactive to light.   Neck:      Musculoskeletal: Normal range of motion and neck supple.   Cardiovascular:      Rate and Rhythm: Normal rate and regular rhythm.      Pulses: Normal pulses.      Heart sounds: Normal heart sounds.   Pulmonary:      Effort: Pulmonary effort is normal.      Breath sounds: Normal breath sounds.   Abdominal:      Palpations: Abdomen is soft.      Tenderness: There is no abdominal tenderness.   Musculoskeletal: Normal range of motion. "   Skin:     General: Skin is warm and dry.   Neurological:      General: No focal deficit present.      Mental Status: She is alert.      Motor: Motor function is intact.      Coordination: Coordination is intact.      Gait: Gait is intact.      Deep Tendon Reflexes: Reflexes are normal and symmetric.      Comments: Exam limited given inability to answer questions in coherent manner.  Will need mental health  evaluation when able to provide reliable history.   Psychiatric:         Mood and Affect: Affect is blunt and flat.         Speech: She is noncommunicative.         Behavior: Behavior is uncooperative. Behavior is not agitated.         Judgment: Judgment is inappropriate.         ED Course        Procedures             Critical Care time:  none             Labs Ordered and Resulted from Time of ED Arrival Up to the Time of Departure from the ED   ALCOHOL BREATH TEST POCT - Normal   DRUG ABUSE SCREEN 6 CHEM DEP URINE ([facility])   HCG QUALITATIVE URINE            Assessments & Plan (with Medical Decision Making)   Psychotic disorder, likely at least in part due to methamphetamine use. It is not possible to obtain further reliable details of history at this time. Will need further assessment by mental health  when she is able to provide history. Will give olanzapine and reassess. Will sign out to oncoming ED staff.    I have reviewed the nursing notes.    I have reviewed the findings, diagnosis, plan and need for follow up with the patient.    New Prescriptions    No medications on file       Final diagnoses:   Psychosis, unspecified psychosis type (H)   Methamphetamine use (H)       12/21/2019   [facility], [place], EMERGENCY DEPARTMENT     Reinier Rogel MD  12/21/19 0653

## 2019-12-21 NOTE — ED NOTES
"Sign out note: Serenity Jolly is a 44 year old female was signed out to me by Dr. Rogel at 0735 am.  Please see initial dictation for full details and history.  Patient has psychosis and methamphetamine abuse.  She is currently pacing and repeatedly stating \"nothing is real\".  She has been treated with Zyprexa..  Plan at time of sign out is to have a BEC assessment once the patient is able to participate.    Course in the ED:  Alta ,DEC , saw the patient at approximately 1145 AM and provided additional history.  Please see the DEC 's note in Epic dated 12/21/2019 for further details.  The patient is now awake, she knows her name and knows where she is.  She does not remember last night, the last thing she remembers is drinking alcohol.  She is unsure how much alcohol she drank last night.  The patient's urine did test positive for amphetamines.  The patient is unable to tell us what drugs she is using.    The patient denies any suicidal ideation.  She is requesting to be discharged to home with family members.  The patient is not interested in going to detox.  She does report that she is having withdrawal symptoms, but she is not telling us what symptoms she is having.    (I, Mayco Veliz, am serving as a trained medical scribe to document services personally performed by Patt Major MD, based on the provider's statements to me.   I, Patt Major MD, was physically present and have reviewed and verified the accuracy of this note documented by Mayco Veliz.)    I evaluated the patient frequently.  She appears to be confused and is not mentating appropriately.  She appears to be still under the influence of drugs.  She was able to eat a little bit of lunch but was not really tracking.  She was unable to contact her family by phone because she didn't seem to understand how to use the phone. She was unable to cooperate with a urine sample.  She became much more agitated at approximately " 12:45 PM.  An autistic patient was screaming in the ED and the patient thought it was her son and tried to reach him.   She requested medication to calm her down.  I did treat her with additional Zyprexa p.o.  Shortly after she received Zyprexa p.o.  The patient bolted down the hallway.  A code 21 was called.  The patient was placed in seclusion and has since calmed down.     Further history obtained from Alta later in the afternoon is that the patient's sister states that the patient continued to be paranoid, even after discharge.  She started using drugs almost immediately after she was discharged.  The family is afraid of her and her parents will not come and get her.    Given this history, I believe it would be prudent to admit the patient for further evaluation.  He is not mentating appropriately and continues her substance abuse.  She is not safe to be discharged.      Clinical impression: 1. Polysubstance abuse 2. Psychosis NOS        This note was created in part by the use of Dragon voice recognition dictation system. Inadvertent grammatical errors and typographical errors may still exist.  MD Moriah Gonzalez Alda L, MD  12/21/19 2013

## 2019-12-21 NOTE — ED NOTES
Patient ran from staff. When staff approached her down cabrera patient taking socks off and resisting help to walk back to room. Patient eventually brought back to sulcation but still resisting staff. When staff remove hands from holding her up patient turns to run again. Patient placed into sulcation due to elopement. Stating she wants to leave.

## 2019-12-21 NOTE — ED NOTES
"Patient coming out of room. Patient thinks a different patient is her  and we are harming him. Patient does not appear to understand basic redirection. Asking to leave. Patient requesting \"medicine to calm down\". MD notified.  "

## 2019-12-21 NOTE — ED NOTES
Pt laying on floor in room 14, door unlocked. Pt sister called and talking to DEC , stating pt exhibiting paranoid behaviors, thinking people are trying to kill her, and demonstrating weird behavior.   Pt continues to have trouble tracking, to use phone, void in urine cup, but did eat part of her lunch.

## 2019-12-21 NOTE — ED NOTES
Within an hour after restraint an in person face to face assessment was completed at 1305 pm, including an evaluation of the patient's immediate reaction to the intervention, behavioral assessment and review/assessment of history, drugs and medications, recent labs, etc., and behavioral condition.  The patient experienced: No adverse physical outcome from seclusion/restraint initiation.  The intervention of restraint or seclusion needs to continue    Patt Major MD.       Patt Major MD  12/21/19 1305

## 2019-12-21 NOTE — ED NOTES
Bed: ED12  Expected date:   Expected time:   Means of arrival:   Comments:  [facility] 7760  43 y/o F  Mental Health Problem

## 2019-12-22 PROCEDURE — 99223 1ST HOSP IP/OBS HIGH 75: CPT | Mod: AI | Performed by: NURSE PRACTITIONER

## 2019-12-22 PROCEDURE — 25000132 ZZH RX MED GY IP 250 OP 250 PS 637: Performed by: NURSE PRACTITIONER

## 2019-12-22 PROCEDURE — 12400001 ZZH R&B MH UMMC

## 2019-12-22 PROCEDURE — 25000132 ZZH RX MED GY IP 250 OP 250 PS 637: Performed by: PSYCHIATRY & NEUROLOGY

## 2019-12-22 RX ORDER — OLANZAPINE 10 MG/2ML
10 INJECTION, POWDER, FOR SOLUTION INTRAMUSCULAR 3 TIMES DAILY PRN
Status: DISCONTINUED | OUTPATIENT
Start: 2019-12-22 | End: 2019-12-26 | Stop reason: HOSPADM

## 2019-12-22 RX ORDER — FOLIC ACID 1 MG/1
1 TABLET ORAL DAILY
Status: DISCONTINUED | OUTPATIENT
Start: 2019-12-22 | End: 2019-12-26 | Stop reason: HOSPADM

## 2019-12-22 RX ORDER — DIAZEPAM 5 MG
5-20 TABLET ORAL EVERY 30 MIN PRN
Status: DISCONTINUED | OUTPATIENT
Start: 2019-12-22 | End: 2019-12-23

## 2019-12-22 RX ORDER — OLANZAPINE 5 MG/1
5 TABLET ORAL AT BEDTIME
Status: DISCONTINUED | OUTPATIENT
Start: 2019-12-22 | End: 2019-12-23

## 2019-12-22 RX ORDER — OLANZAPINE 5 MG/1
5 TABLET, ORALLY DISINTEGRATING ORAL 3 TIMES DAILY PRN
Status: DISCONTINUED | OUTPATIENT
Start: 2019-12-22 | End: 2019-12-26 | Stop reason: HOSPADM

## 2019-12-22 RX ORDER — LANOLIN ALCOHOL/MO/W.PET/CERES
100 CREAM (GRAM) TOPICAL DAILY
Status: DISCONTINUED | OUTPATIENT
Start: 2019-12-22 | End: 2019-12-26 | Stop reason: HOSPADM

## 2019-12-22 RX ORDER — NICOTINE 21 MG/24HR
1 PATCH, TRANSDERMAL 24 HOURS TRANSDERMAL DAILY
Status: DISCONTINUED | OUTPATIENT
Start: 2019-12-22 | End: 2019-12-26 | Stop reason: HOSPADM

## 2019-12-22 RX ORDER — GABAPENTIN 300 MG/1
300 CAPSULE ORAL 3 TIMES DAILY PRN
Status: DISCONTINUED | OUTPATIENT
Start: 2019-12-22 | End: 2019-12-26 | Stop reason: HOSPADM

## 2019-12-22 RX ORDER — NICOTINE 21 MG/24HR
1 PATCH, TRANSDERMAL 24 HOURS TRANSDERMAL DAILY
Status: DISCONTINUED | OUTPATIENT
Start: 2019-12-22 | End: 2019-12-22

## 2019-12-22 RX ADMIN — NICOTINE 1 PATCH: 14 PATCH, EXTENDED RELEASE TRANSDERMAL at 14:27

## 2019-12-22 RX ADMIN — ACETAMINOPHEN 650 MG: 325 TABLET, FILM COATED ORAL at 14:31

## 2019-12-22 RX ADMIN — MULTIPLE VITAMINS W/ MINERALS TAB 1 TABLET: TAB at 09:44

## 2019-12-22 RX ADMIN — OLANZAPINE 5 MG: 5 TABLET, ORALLY DISINTEGRATING ORAL at 19:33

## 2019-12-22 RX ADMIN — Medication 100 MG: at 14:26

## 2019-12-22 RX ADMIN — OLANZAPINE 5 MG: 5 TABLET, FILM COATED ORAL at 21:50

## 2019-12-22 RX ADMIN — FOLIC ACID 1 MG: 1 TABLET ORAL at 14:26

## 2019-12-22 RX ADMIN — NICOTINE POLACRILEX 2 MG: 2 GUM, CHEWING BUCCAL at 14:27

## 2019-12-22 ASSESSMENT — ACTIVITIES OF DAILY LIVING (ADL)
LAUNDRY: UNABLE TO COMPLETE
DRESS: SCRUBS (BEHAVIORAL HEALTH)
HYGIENE/GROOMING: INDEPENDENT
ORAL_HYGIENE: INDEPENDENT

## 2019-12-22 NOTE — ED PROVIDER NOTES
The pt signed out to me from Dr Major. The pt was initially seen by Dr Rogel this early am.  She remains extremely disorganized. There is now a mental health bed. Will admit and place on hold.     Conor Lopez MD  12/21/19 7032

## 2019-12-22 NOTE — PROGRESS NOTES
Initial Psychosocial Assessment    I have reviewed the chart, met with the patient, and developed Care Plan.  Information for assessment was obtained from chart review only as patient is unable to stay awake to participate.    Presenting Problem:  Patient was admitted on a 72 hour hold with psychosis, possibly drug induced.  She stopped taking her medications about 3 weeks ago and has relapsed on meth per family.    History of Mental Health and Chemical Dependency:  Patient has a history of psychosis and substance abuse.  She was on 3A detox unit in November and transitioned to [facility] program from 11/13 until she requested discharge AMA on 11/17.  This is her first mental health admission. Records show she has had CD treatment in the past at [facility] and [facility].     Family Description (Constellation, Family Psychiatric History):  Patient is  with 4 children.  Brother with substance use issues.      Significant Life Events (Illness, Abuse, Trauma, Death):  Some trauma history likely but unable to confirm.    Living Situation:  With parents and her children.    Educational Background:  Unable to assess.    Occupational History:  Not employed    Financial Status:  Unable to assess    Legal Issues:  Current probation, unclear what charges.    Ethnic/Cultural Issues:  Unable to assess    Spiritual Orientation:  Unable to assess     Service History:  None     Social Functioning (organization, interests):  Unable to assess    Current Treatment Providers are:  PCP is with [facility] [phone]  No mental health providers.    Social Service Assessment/Plan:  Patient will be seen by the on-call psychiatric provider.  Withdrawal protocol, continued close monitoring.  At this time patient is unable to participate so plan is not clear.  Likely recommendation for CD treatment, treatment for psychosis.  She will meet with the treatment team on Monday to further coordinate plan of care as  she is able.  CTC available to assist as needed.

## 2019-12-22 NOTE — PROGRESS NOTES
Pt arrived on the unit at 6:30 pm accompanied by transport staff.  Admitted for evaluation of psychosis following relapse into meth use.  She is alert and oriented to self and place.  Appears guarded and distractible.  Pt said she fears people may be trying to harm her and her family.  While in ED she was given Zyprexa and attempted to elope.  Pt was placed in seclusion.  She was placed on an emergency hold prior to admission.    Appears calmer after eating dinner.  She reports that she stopped taking her medications 3 weeks ago and began using methamphetamine daily.  Last use was yesterday.  Her home medications were verified by pharmacy.  Pt's Abilify and Zoloft were not reordered by on call MD as she has not taken it for 3 weeks.

## 2019-12-22 NOTE — PHARMACY-ADMISSION MEDICATION HISTORY
Admission medication history for the December 21, 2019 admission is complete.     Interview Sources: [facility], Surescripts, Chart Review    Reliability of Source: Moderate - The patient was not responsive when interviewed. Spoke with the patient's sister, who reported she fills her medications at [facility]. Fill history was obtained in [facility] and [facility] [employer].    Medication Adherence: Unknown    Current Outpatient Pharmacy: [address]    Changes made to PTA medication list (reason)  Added: none    Deleted:   -Sertraline 50 mg tablet: Take 50 mg by mouth daily (dose increased to 100 mg daily 11/15/19 per consult note Roque Qiu MD)    Changed:  -Aripiprazole: Take 2 mg by mouth 2 times daily >>> Take 2.5 mg by mouth 2 times daily (Written prescription in chart review from 11/15 indicated directions to take 2 mg twice daily, but the prescription filled on 11/15 in [facility] was for 5 mg tablets with directions to take 0.5 tablet (2.5 mg) twice daily. It is unclear why the filled prescription differs from the note in the patient's chart.)    Additional medication history information:   None    Prior to Admission Medication List:  Prior to Admission medications    Medication Sig Last Dose Taking? Auth Provider   acetaminophen (TYLENOL) 325 MG tablet Take 325-650 mg by mouth every 4 hours as needed for mild pain Unknown  Reported, Patient   alum & mag hydroxide-simethicone (MYLANTA/MAALOX) 200-200-20 MG/5ML SUSP suspension Take 30 mLs by mouth every 6 hours as needed for indigestion Unknown  Reported, Patient   ARIPiprazole (ABILIFY) 5 MG tablet Take 2.5 mg by mouth 2 times daily  Unknown  Unknown, Entered By History   guaiFENesin (ROBITUSSIN) 20 mg/mL SOLN solution Take 10 mLs by mouth every 4 hours as needed for cough Unknown  Reported, Patient   ibuprofen (ADVIL/MOTRIN) 200 MG tablet Take 400 mg by mouth every 6 hours as  needed for mild pain Unknown  Reported, Patient   loratadine (CLARITIN) 10 MG tablet Take 10 mg by mouth daily as needed for allergies Unknown  Reported, Patient   melatonin 3 MG tablet Take 3 mg by mouth nightly as needed for sleep Unknown  Reported, Patient   multivitamin w/minerals (THERA-VIT-M) tablet Take 1 tablet by mouth daily Unknown  Roque Qiu MD   OLANZapine (ZYPREXA) 10 MG tablet Take 1 tablet (10 mg) by mouth At Bedtime Unknown  Roque Qiu MD   OLANZapine (ZYPREXA) 2.5 MG tablet Take 1 tablet (2.5 mg) by mouth 3 times daily as needed (paranoia) Unknown  Roque Qiu MD   phenol-menthol (CEPASTAT) 14.5 MG lozenge Place 1 lozenge inside cheek every 2 hours as needed for moderate pain Unknown  Reported, Patient   senna-docusate (SENOKOT-S/PERICOLACE) 8.6-50 MG tablet Take 2 tablets by mouth daily as needed for constipation Unknown  Reported, Patient   sertraline (ZOLOFT) 100 MG tablet Take 1 tablet (100 mg) by mouth daily Unknown  Roque Qiu MD       Time spent: 20 minutes    Medication history completed by: TAYLOR Purcell3

## 2019-12-22 NOTE — H&P
"DATE OF ADMISSION: 12/21/2019                                     PATIENT'S [id number]   DATE OF SERVICE: 12/22/2019                                           PATIENT'S: 1975  ADMITTING PROVIDER: Ignacio Long MD  ATTENDING PROVIDER: Mark KUMAR CNP  LEGAL STATUS:  Voluntary  SOURCES OF INFORMATION: Information was obtained from the patient and available records.  CHIEF COMPLAIN: \"Paranoia\".  HISTORY F PRESENT ILLNESS: Serenity Jolly is a 44 year old female with history of polysubstance abuse including alcohol, methamphetamines, marijuana, heroin, benzodiazepines, and unspecified psychotic disorder.  The patient was brought to the emergency room by EMS.  She was very disorganized, confused, and not making sense.  She made bizarre statements.  The patient was  in detox in November 2019.  She was discharged to [facility], but self discharged after 5 days.  She relapsed almost immediately.  Her sister reports paranoia and psychosis even when not using drugs.  The patient is a poor historian.  She is not able to respond to most questions.  She is falling asleep while talking.  The patient reports that she is in the hospital due to paranoia. she is afraid that her children will get hurt or she will get killed. She has been paranoid for years.  She was not able to say if the paranoia is related to the meth use. Denies auditory visual hallucinations.  The patient rates the depression as \"not much\".  Her sleep is poor, averaging 3 hours a night.  She is not taking naps during the day.  Her energy is low, memory and concentration impaired.  Her appetite is normal.  Reports psychomotor agitation.  The anxiety is  \"not bad\".  Was not able to respond to questions regarding manic episodes, and panic attacks.  Reports history of PTSD however,did not elaborate.  According to her chart, she has been abused and has been having nightmares, flashbacks, and trust issues.  She reported being very jumpy.  Denies " "suicidal homicidal ideation.  Denies history of OCD, eating disorders, borderline personality disorder.  Denies suicide attempts.  When asked about self injury behaviors, patient responded \"probably\".  Currently does not have a psychiatrist.  She stopped her medications 3 or 4 weeks ago.  Did not think that the medications are helpful.  The patient is unable to identify goal for this hospitalization.  SUBSTANCE USE HISTORY:   The patient is a very long history of polysubstance abuse including marijuana, methamphetamines, alcohol, heroin, and benzodiazepines.  According to her chart, drug of choice is meth and alcohol.  She has been having seizures when using heroin and benzodiazepines.  The patient has relapsed almost immediately after discharge in November.  She has been using meth on a daily basis.  She has been drinking 5 or 6 times a week.  She was not able to identify the amount of alcohol and meth she has been using.  Denies any other drug use.  According to her chart, the patient has build tolerance to all of the tasks she has abused.  She experienced withdrawal symptoms and legal actions.  She is currently on probation for drug possession.    PSYCHIATRIC HISTORY:   The patient has a history of unspecified psychosis possibly related to polysubstance abuse.  Currently does not have a psychiatrist.  During her detox hospitalization in November, she was started on Zyprexa and Zoloft.  Somehow the Zyprexa was changed to Abilify.  The patient has not taken any of these medications in a while.  According to her chart she has been on BuSpar, and Ativan.  This is her first admission for mental illness.  Never attempted suicide.  Self injury behaviors is not clear.  PAST MEDICAL HISTORY:   Past Medical History:   Diagnosis Date     Anxiety      Cerebral infarction (H)     2 heart attacks 4/2016     Depressive disorder      Myocardial infarction (H)      Substance abuse (H)        Past Surgical History:   Procedure " Laterality Date     BREAST SURGERY      breast augmentation     COSMETIC SURGERY       ENT SURGERY       GYN SURGERY      drained R ovary       ALLERGIES:    Allergies   Allergen Reactions     Sulfa Drugs Hives     FAMILY HISTORY:  Patient is not able to respond to this question.  No family history on file.    SOCIAL HISTORY:   Patient is .  She has 4 children.  Her parents take care of her kids.  The patient lives with her parents.  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 Currently on probation for drug possession.   MEDICAL REVIEW OF SYSTEM: Please refer to the review of systems done by Reinier Rogel MD on 12/21/19, which I reviewed and confirmed.   MEDICATIONS PRIOR TO ADMISSION:   Prior to Admission medications    Medication Sig Start Date End Date Taking? Authorizing Provider   acetaminophen (TYLENOL) 325 MG tablet Take 325-650 mg by mouth every 4 hours as needed for mild pain    Reported, Patient   alum & mag hydroxide-simethicone (MYLANTA/MAALOX) 200-200-20 MG/5ML SUSP suspension Take 30 mLs by mouth every 6 hours as needed for indigestion    Reported, Patient   ARIPiprazole (ABILIFY) 5 MG tablet Take 2.5 mg by mouth 2 times daily     Unknown, Entered By History   guaiFENesin (ROBITUSSIN) 20 mg/mL SOLN solution Take 10 mLs by mouth every 4 hours as needed for cough    Reported, Patient   ibuprofen (ADVIL/MOTRIN) 200 MG tablet Take 400 mg by mouth every 6 hours as needed for mild pain    Reported, Patient   loratadine (CLARITIN) 10 MG tablet Take 10 mg by mouth daily as needed for allergies    Reported, Patient   melatonin 3 MG tablet Take 3 mg by mouth nightly as needed for sleep    Reported, Patient   multivitamin w/minerals (THERA-VIT-M) tablet Take 1 tablet by mouth daily 11/13/19   Roque Qiu MD   OLANZapine (ZYPREXA) 10 MG tablet Take 1 tablet (10 mg) by mouth At Bedtime 11/12/19   Roque Qiu MD   OLANZapine (ZYPREXA) 2.5 MG tablet Take 1 tablet  "(2.5 mg) by mouth 3 times daily as needed (paranoia) 11/12/19   Roque Qiu MD   phenol-menthol (CEPASTAT) 14.5 MG lozenge Place 1 lozenge inside cheek every 2 hours as needed for moderate pain    Reported, Patient   senna-docusate (SENOKOT-S/PERICOLACE) 8.6-50 MG tablet Take 2 tablets by mouth daily as needed for constipation    Reported, Patient   sertraline (ZOLOFT) 100 MG tablet Take 1 tablet (100 mg) by mouth daily 11/15/19   Roque Qiu MD     LABORATORY DATA:   Recent Results (from the past 672 hour(s))   Alcohol breath test POCT    Collection Time: 12/21/19  1:43 AM   Result Value Ref Range    Alcohol Breath Test 0.00 0.00 - 0.01   Drug abuse screen 6 urine (chem dep)    Collection Time: 12/21/19  4:51 PM   Result Value Ref Range    Amphetamine Qual Urine Positive (A) NEG^Negative    Barbiturates Qual Urine Negative NEG^Negative    Benzodiazepine Qual Urine Negative NEG^Negative    Cannabinoids Qual Urine Negative NEG^Negative    Cocaine Qual Urine Negative NEG^Negative    Ethanol Qual Urine Negative NEG^Negative    Opiates Qualitative Urine Negative NEG^Negative   HCG qualitative urine (UPT)    Collection Time: 12/21/19  4:51 PM   Result Value Ref Range    HCG Qual Urine Negative NEG^Negative     PHYSICAL EXAMINATON:   Temp: 97.9  F (36.6  C) Temp src: Tympanic BP: 114/80 Pulse: 94   Resp: 16 SpO2: 99 % O2 Device: None (Room air)    5' 6\" 139 lbs 1.6 oz Body mass index is 22.45 kg/m .  MENTAL STATUS EXAM: The patient is a 44 years old, , female who is clean and dressed in hospital scrubs.  She is laying in bed covered with blankets.  She is falling asleep while talking.  She declined to answer a lot of questions.  Eye contact is poor, mood is \"okay\", affect is sleepy, speech is difficult to understand at times as the patient is mumbling while talking, she responds to questions slowly.  Psychomotor behavior is positive for retardation, thought process is linear, no loose " associations, thought content is negative for suicidal homicidal ideation, positive for paranoia, insight and judgment are poor, she is oriented to self, and place, attention span and concentration are difficult to assess, recent and remote memory are difficult to assess, she does have problems expressing herself, fund of knowledge was difficult to assess.    DIAGNOSIS:  1.  Substance-induced psychotic disorder  2.  Methamphetamine use disorder, severe  3.  Alcohol use disorder severe  4.  History of abusing benzodiazepines, heroin, marijuana, and many other substances  5.  Tobacco use disorder  PLAN AND RECOMMENDATIONS: The patient is a 44 years old  female who was admitted with psychosis, and bizarre behavior after relapsing on meth.  She has been drinking 5 to 6 days a week.  She has been using meth on a daily basis in the last 3 weeks. She is not able to quantify her alcohol and drug use.  The patient is not  interested in taking medications.  She was agreeable to the following:   --Start Zyprexa, 5 mg at bedtime.    --She will have PRN Zyprexa p.o. or IM available as well.    --PRN Gabapentin for anxiety and withdrawal.   --Nicotine patch and gum.   --MSSA with Valuim for alcohol withdrawals   --The patient is not interested in chemical dependency treatment.  Her goal is to get back to her children.  --The patient asked to be discharged and when her request was denied, she requested to have Xanax for anxiety or be able to go outside and have a smoke.  The patient later decided that she wants to go to CD treatment right away. Explained that it is not possible to be done today.   --Estimated length of stay 5 to 7 days.    --Disposition, to home.  --The patient was consulted on nature of illness and treatment options. Care was coordinated with the treatment team.  Attestation: Patient has been seen and evaluated by ray KUMAR CNP  12/22/2019  1:51 PM  This note was created with the help of  Dragon dictation system. All grammatical/typing errors or context distortion are unintentional and inherent to software.

## 2019-12-22 NOTE — PROGRESS NOTES
Patient Belonging in Locker:   1. Blue socks  2. Black hair tie  3. Black boots  4. Blue Jeans  5. Gray sweater w/strings  6. Blue scarf.  7. Black bra  8. Cigarettes (6)  9. Loose change ($1.12)  10. Ring (remains with patient)    Patient Belonging Sent to Security:  1. Pocket knife      ...A               Admission:  I am responsible for any personal items that are not sent to the safe or pharmacy.  [facility] is not responsible for loss, theft or damage of any property in my possession.    Signature:  _________________________________ Date: _______  Time: _____                                              Staff Signature:  ____________________________ Date: ________  Time: _____      2nd Staff person, if patient is unable/unwilling to sign:    Signature: ________________________________ Date: ________  Time: _____     Discharge:  [facility] has returned all of my personal belongings:    Signature: _________________________________ Date: ________  Time: _____                                          Staff Signature:  ____________________________ Date: ________  Time: _____

## 2019-12-23 PROCEDURE — 25000132 ZZH RX MED GY IP 250 OP 250 PS 637: Performed by: NURSE PRACTITIONER

## 2019-12-23 PROCEDURE — 12400001 ZZH R&B MH UMMC

## 2019-12-23 PROCEDURE — 99232 SBSQ HOSP IP/OBS MODERATE 35: CPT | Performed by: NURSE PRACTITIONER

## 2019-12-23 PROCEDURE — 25000132 ZZH RX MED GY IP 250 OP 250 PS 637: Performed by: PSYCHIATRY & NEUROLOGY

## 2019-12-23 RX ORDER — IBUPROFEN 600 MG/1
600 TABLET, FILM COATED ORAL EVERY 6 HOURS PRN
Status: DISCONTINUED | OUTPATIENT
Start: 2019-12-23 | End: 2019-12-26 | Stop reason: HOSPADM

## 2019-12-23 RX ORDER — OLANZAPINE 10 MG/1
10 TABLET ORAL AT BEDTIME
Status: DISCONTINUED | OUTPATIENT
Start: 2019-12-23 | End: 2019-12-26 | Stop reason: HOSPADM

## 2019-12-23 RX ADMIN — Medication 100 MG: at 09:38

## 2019-12-23 RX ADMIN — NICOTINE POLACRILEX 2 MG: 2 GUM, CHEWING BUCCAL at 09:37

## 2019-12-23 RX ADMIN — NICOTINE 1 PATCH: 21 PATCH, EXTENDED RELEASE TRANSDERMAL at 09:37

## 2019-12-23 RX ADMIN — MELATONIN TAB 3 MG 3 MG: 3 TAB at 20:11

## 2019-12-23 RX ADMIN — ACETAMINOPHEN 650 MG: 325 TABLET, FILM COATED ORAL at 09:37

## 2019-12-23 RX ADMIN — NICOTINE POLACRILEX 2 MG: 2 GUM, CHEWING BUCCAL at 20:11

## 2019-12-23 RX ADMIN — MULTIPLE VITAMINS W/ MINERALS TAB 1 TABLET: TAB at 09:38

## 2019-12-23 RX ADMIN — FOLIC ACID 1 MG: 1 TABLET ORAL at 09:38

## 2019-12-23 RX ADMIN — IBUPROFEN 600 MG: 600 TABLET ORAL at 20:11

## 2019-12-23 RX ADMIN — OLANZAPINE 10 MG: 10 TABLET, FILM COATED ORAL at 20:11

## 2019-12-23 NOTE — PROGRESS NOTES
"[facility]   Psychiatric Progress Note      Impression:     Serenity \"Shawna\" Rik is a 44-year-old female admitted to [facility] on 12/21/2019.  She was admitted on a 72-hour hold through the ER, brought in via EMS due to psychosis.  She was disorganized and experiencing paranoid thought content.  She was most recently hospitalized on 3A detox in November 2019 and left AMA.  She has a history of abusing cannabis, meth, alcohol, heroin and benzos.  UTOX was positive for amphetamines.  She had not been taking any medications prior to admission.  She was on the MSSA and did not require treatment with Valium.  Scheduled Zyprexa was initiated.  PRNs of Melatonin, Zyprexa, Neurontin and Trazodone were initiated.  She continues to have paranoid and delusional thought content and remains disorganized.           Diagnoses:     Substance-induced psychotic disorder  Methamphetamine use disorder, severe  Alcohol use disorder, severe  History of abusing benzodiazepines, heroin, marijuana, and many other substances  Nicotine use disorder         Plan:     Medications:  Increase Zyprexa to 10 mg at bedtime.  Will discuss the possibility of restarting Zoloft on 12/26.  Continue PRNs of Neurontin, Melatonin, Zyprexa and Trazodone.    Reordered labs for tomorrow; she dislikes needles and is unsure whether she will allow her blood to be drawn.      Discontinue MSSA.      She lives with her parents and children.  She has a PCP.  She would benefit from residential CD treatment however states she is only willing to go to outpatient CD treatment; options for this are limited in the [facility] where she resides.        Clinical Global Impressions  First:  Considering your total clinical experience with this particular patient population, how severe are the patient's symptoms at this time?: 7 (12/23/19 9616)  Compared to the patient's condition at the START of treatment, this " patient's condition is:: 4 (12/23/19 0726)  Most recent:  Considering your total clinical experience with this particular patient population, how severe are the patient's symptoms at this time?: 7 (12/23/19 0726)  Compared to the patient's condition at the START of treatment, this patient's condition is:: 4 (12/23/19 0726)      Attestation:  Patient has been seen and evaluated by me, STACY Paniagua CNP  The patient was counseled on nature of illness and treatment plan/options  Care was coordinated with treatment team          Interim History:     The patient's care was discussed with the treatment team and chart notes were reviewed.  Pt was documented as sleeping 7 and 6.5 hours during the weekend overnight shifts.  Pt's MSSA scores have ranged from 2 to 6 and she has not required treatment with Valium.  Pt refused labs yesterday and today.  States she doesn't like needles, yet also mentioned recent IV drug use.  Discussed reordering labs for tomorrow and including hepatitis C and HIV.  She was unsure whether she would agree to this.  Pt states she wants to stop using but that she is unwilling to go to residential treatment because she wants to see her children daily.  She lives in [place], where CD treatment resources are scarce.  She characterizes her mood as somewhat depressed and anxious.  She denies suicidal ideation.  She believes she is receiving vague messages from the Internet and that people can read her mind.  She is also fearful that she, her children, and other family members will be harmed, but is unsure of who would harm them.  She states that she doesn't believe she is having any hallucinations but isn't sure.  Pt appeared disorganized.  Pt agreed to increase Zyprexa.           Medications:     Current Facility-Administered Medications   Medication     acetaminophen (TYLENOL) tablet 650 mg     alum & mag hydroxide-simethicone (MYLANTA ES/MAALOX  ES) suspension 30 mL     bisacodyl  "(DULCOLAX) Suppository 10 mg     diazepam (VALIUM) tablet 5-20 mg     folic acid (FOLVITE) tablet 1 mg     gabapentin (NEURONTIN) capsule 300 mg     hydrOXYzine (ATARAX) tablet 25 mg     ibuprofen (ADVIL/MOTRIN) tablet 600 mg     loratadine (CLARITIN) tablet 10 mg     magnesium hydroxide (MILK OF MAGNESIA) suspension 30 mL     melatonin tablet 3 mg     multivitamin w/minerals (THERA-VIT-M) tablet 1 tablet     nicotine (NICODERM CQ) 21 MG/24HR 24 hr patch 1 patch     nicotine (NICORETTE) gum 2 mg     nicotine Patch in Place     OLANZapine zydis (zyPREXA) ODT tab 5 mg    Or     OLANZapine (zyPREXA) injection 10 mg     OLANZapine (zyPREXA) tablet 10 mg     OLANZapine (zyPREXA) tablet 2.5 mg     traZODone (DESYREL) tablet 50 mg     vitamin B1 (THIAMINE) tablet 100 mg     Facility-Administered Medications Ordered in Other Encounters   Medication     Self Administer Medications: Behavioral Services             Allergies:     Allergies   Allergen Reactions     Sulfa Drugs Hives            Psychiatric Examination:     /82   Pulse 97   Temp 97.7  F (36.5  C) (Oral)   Resp 16   Ht 1.676 m (5' 6\")   Wt 63.1 kg (139 lb 1.6 oz)   SpO2 99%   BMI 22.45 kg/m      Appearance:  awake, alert and adequately groomed  Attitude:  cooperative  Eye Contact:  good  Mood:  somewhat depressed, anxious  Affect:  appropriate and in normal range  Speech:  clear, coherent  Psychomotor Behavior:  no evidence of tardive dyskinesia, dystonia, or tics  Thought Process:  disorganized  Associations:  no loose associations  Thought Content:  no evidence of suicidal ideation or homicidal ideation, paranoia and delusional thought content are evident, unsure whether she has been having hallucinations  Insight:  limited  Judgment:  limited  Oriented to:  date, time, person, and place  Attention Span and Concentration:  limited  Recent and Remote Memory:  some impairment  Language:  intact  Fund of Knowledge:  appropriate  Muscle Strength and " Tone: normal  Gait and Station:  normal         Labs:     No results found for this or any previous visit (from the past 24 hour(s)).

## 2019-12-23 NOTE — PROGRESS NOTES
BEHAVIORAL TEAM DISCUSSION    Participants: Silvia Palmer, CATALINO; CLIFFORD German; christi RN  Progress: pt was admitted on a 72 HH over the weekend due to psychosis, most likely drug induced.  She relapsed on meth per collateral report  Anticipated length of stay: TBD  Continued Stay Criteria/Rationale: pt remains disorganized and with impaired judgment  Medical/Physical: see chart  Precautions:   Behavioral Orders   Procedures     Code 1 - Restrict to Unit     Elopement precautions     Routine Programming     As clinically indicated     Status 15     Every 15 minutes.     Plan: assess, monitor and stabilize.  Pt has recently quit CDtx AMA and may need updated rule 25  Rationale for change in precautions or plan: new admission

## 2019-12-23 NOTE — PROGRESS NOTES
"Pt denies SI/SIB. Writer asked PT about AH/VH pt answered \" I don't know\". Pt was staring at the ceiling during check in not maintaining eye contact. Pt is not sure why she is admitted at the hospital and was taking guesses. Pt stated that she ate half of her dinner and requested some snack an hour after dinner was served. Pt's mood is calm. Pt is easily distracted. Pt stayed in her room majority of the evening. Pt showered.          12/22/19 1900   Behavioral Health   Hallucinations denies / not responding to hallucinations   Thinking poor concentration;distractable;confused   Orientation person: oriented;place: oriented   Memory short term   Insight poor   Judgement impaired   Eye Contact into space   Affect blunted, flat   Mood mood is calm;anxious   Physical Appearance/Attire attire appropriate to age and situation   Hygiene other (see comment)  (Fair)   Suicidality other (see comments)  (Pt denies)   1. Wish to be Dead (Recent) No   2. Non-Specific Active Suicidal Thoughts (Recent) No   3. Active Sucidal Ideation with any Methods (Not Plan) Without Intent to Act (Recent) No   4. Active Suicidal Ideation with Some Intent to Act, Without Specific Plan (Recent) No   5. Active Suicidal Ideation with Specific Plan and Intent (Recent) No   Self Injury other (see comment)  (Denies)   Elopement   (None observed/stated)   Activity withdrawn;isolative   Speech pressured;clear   Medication Sensitivity no observed side effects;no stated side effects   Psychomotor / Gait balanced;steady   Psycho Education   Type of Intervention 1:1 intervention   Response participates with encouragement   Hours 0.5   Treatment Detail Check In   Activities of Daily Living   Hygiene/Grooming independent   Oral Hygiene independent   Dress scrubs (behavioral health)   Laundry unable to complete   Room Organization independent     "

## 2019-12-23 NOTE — PLAN OF CARE
"48 hour assessment    Admission date 12/21/2019 .    Admitting dx:Methamphetamine use (H) [F15.10]  Psychosis, unspecified psychosis type (H) [F29]     /87   Pulse 82   Temp 97.6  F (36.4  C) (Tympanic)   Resp 16   Ht 1.676 m (5' 6\")   Wt 63.1 kg (139 lb 1.6 oz)   SpO2 97%   BMI 22.45 kg/m         Pt has been isolative and withdrawn. Pt has not been social with peers or staff, out for meals and medications. Pt took a shower Denied goal for today. States she was brought in by ambulance due to her father (who she lives with) called them.  Denies SI and SIB. Denies hallucinations. MSSA was 6 and 4 and has been discontinued. Pt states she is unemployed, but hopes to get a new job soon. Poor eye contract, pt never once looked at writer during several encounters.   Slow responses to questions. Medication complaint. No groups. PRN tylenol given per pts request with some relief to her chronic low back pain.  States she has trouble sleeping, including falling asleep and staying asleep. Educated pt on the numerous interventions that are available to her to sleep. She denied them. Pt appears confused at times.   "

## 2019-12-23 NOTE — PROGRESS NOTES
"Pt has been calm and cooperative.  Appears guarded, disorganized and distractible during interactions.  Denies SI/HI.   Approached staff to request medication for anxiety but was unable to recall which medication even though we talked about it earlier in the shift.  MSSA score was 4.  Pt not exhibiting overt withdrawal symptoms.  She reports feeling \"fine\" with her new roommate.    "

## 2019-12-24 LAB
ALBUMIN SERPL-MCNC: 3.4 G/DL (ref 3.4–5)
ALP SERPL-CCNC: 63 U/L (ref 40–150)
ALT SERPL W P-5'-P-CCNC: 17 U/L (ref 0–50)
ANION GAP SERPL CALCULATED.3IONS-SCNC: 7 MMOL/L (ref 3–14)
AST SERPL W P-5'-P-CCNC: 12 U/L (ref 0–45)
BASOPHILS # BLD AUTO: 0 10E9/L (ref 0–0.2)
BASOPHILS NFR BLD AUTO: 0.9 %
BILIRUB SERPL-MCNC: 0.2 MG/DL (ref 0.2–1.3)
BUN SERPL-MCNC: 9 MG/DL (ref 7–30)
CALCIUM SERPL-MCNC: 8.2 MG/DL (ref 8.5–10.1)
CHLORIDE SERPL-SCNC: 107 MMOL/L (ref 94–109)
CHOLEST SERPL-MCNC: 174 MG/DL
CO2 SERPL-SCNC: 27 MMOL/L (ref 20–32)
CREAT SERPL-MCNC: 0.64 MG/DL (ref 0.52–1.04)
DIFFERENTIAL METHOD BLD: ABNORMAL
EOSINOPHIL # BLD AUTO: 0.3 10E9/L (ref 0–0.7)
EOSINOPHIL NFR BLD AUTO: 6 %
ERYTHROCYTE [DISTWIDTH] IN BLOOD BY AUTOMATED COUNT: 12.6 % (ref 10–15)
GFR SERPL CREATININE-BSD FRML MDRD: >90 ML/MIN/{1.73_M2}
GLUCOSE SERPL-MCNC: 89 MG/DL (ref 70–99)
HCT VFR BLD AUTO: 39.1 % (ref 35–47)
HCV AB SERPL QL IA: NONREACTIVE
HDLC SERPL-MCNC: 53 MG/DL
HGB BLD-MCNC: 12.7 G/DL (ref 11.7–15.7)
HIV 1+2 AB+HIV1 P24 AG SERPL QL IA: NONREACTIVE
IMM GRANULOCYTES # BLD: 0 10E9/L (ref 0–0.4)
IMM GRANULOCYTES NFR BLD: 0 %
LDLC SERPL CALC-MCNC: 108 MG/DL
LYMPHOCYTES # BLD AUTO: 1.7 10E9/L (ref 0.8–5.3)
LYMPHOCYTES NFR BLD AUTO: 39.4 %
MCH RBC QN AUTO: 32.6 PG (ref 26.5–33)
MCHC RBC AUTO-ENTMCNC: 32.5 G/DL (ref 31.5–36.5)
MCV RBC AUTO: 101 FL (ref 78–100)
MONOCYTES # BLD AUTO: 0.3 10E9/L (ref 0–1.3)
MONOCYTES NFR BLD AUTO: 7.9 %
NEUTROPHILS # BLD AUTO: 2 10E9/L (ref 1.6–8.3)
NEUTROPHILS NFR BLD AUTO: 45.8 %
NONHDLC SERPL-MCNC: 121 MG/DL
NRBC # BLD AUTO: 0 10*3/UL
NRBC BLD AUTO-RTO: 0 /100
PLATELET # BLD AUTO: 249 10E9/L (ref 150–450)
POTASSIUM SERPL-SCNC: 3.9 MMOL/L (ref 3.4–5.3)
PROT SERPL-MCNC: 6.3 G/DL (ref 6.8–8.8)
RBC # BLD AUTO: 3.89 10E12/L (ref 3.8–5.2)
SODIUM SERPL-SCNC: 141 MMOL/L (ref 133–144)
T4 FREE SERPL-MCNC: 1.07 NG/DL (ref 0.76–1.46)
TRIGL SERPL-MCNC: 64 MG/DL
TSH SERPL DL<=0.005 MIU/L-ACNC: 0.32 MU/L (ref 0.4–4)
WBC # BLD AUTO: 4.3 10E9/L (ref 4–11)

## 2019-12-24 PROCEDURE — 36415 COLL VENOUS BLD VENIPUNCTURE: CPT | Performed by: NURSE PRACTITIONER

## 2019-12-24 PROCEDURE — 12400001 ZZH R&B MH UMMC

## 2019-12-24 PROCEDURE — 86803 HEPATITIS C AB TEST: CPT | Performed by: NURSE PRACTITIONER

## 2019-12-24 PROCEDURE — 80053 COMPREHEN METABOLIC PANEL: CPT | Performed by: NURSE PRACTITIONER

## 2019-12-24 PROCEDURE — 84439 ASSAY OF FREE THYROXINE: CPT | Performed by: NURSE PRACTITIONER

## 2019-12-24 PROCEDURE — 25000132 ZZH RX MED GY IP 250 OP 250 PS 637: Performed by: PSYCHIATRY & NEUROLOGY

## 2019-12-24 PROCEDURE — 25000132 ZZH RX MED GY IP 250 OP 250 PS 637: Performed by: NURSE PRACTITIONER

## 2019-12-24 PROCEDURE — 80061 LIPID PANEL: CPT | Performed by: NURSE PRACTITIONER

## 2019-12-24 PROCEDURE — 87389 HIV-1 AG W/HIV-1&-2 AB AG IA: CPT | Performed by: NURSE PRACTITIONER

## 2019-12-24 PROCEDURE — 85025 COMPLETE CBC W/AUTO DIFF WBC: CPT | Performed by: NURSE PRACTITIONER

## 2019-12-24 PROCEDURE — 84443 ASSAY THYROID STIM HORMONE: CPT | Performed by: NURSE PRACTITIONER

## 2019-12-24 RX ADMIN — ACETAMINOPHEN 650 MG: 325 TABLET, FILM COATED ORAL at 20:40

## 2019-12-24 RX ADMIN — Medication 100 MG: at 08:59

## 2019-12-24 RX ADMIN — ACETAMINOPHEN 650 MG: 325 TABLET, FILM COATED ORAL at 09:01

## 2019-12-24 RX ADMIN — FOLIC ACID 1 MG: 1 TABLET ORAL at 08:59

## 2019-12-24 RX ADMIN — NICOTINE 1 PATCH: 21 PATCH, EXTENDED RELEASE TRANSDERMAL at 09:00

## 2019-12-24 RX ADMIN — TRAZODONE HYDROCHLORIDE 50 MG: 50 TABLET ORAL at 20:40

## 2019-12-24 RX ADMIN — MULTIPLE VITAMINS W/ MINERALS TAB 1 TABLET: TAB at 08:59

## 2019-12-24 RX ADMIN — OLANZAPINE 10 MG: 10 TABLET, FILM COATED ORAL at 21:23

## 2019-12-24 RX ADMIN — IBUPROFEN 600 MG: 600 TABLET ORAL at 13:32

## 2019-12-24 RX ADMIN — MELATONIN TAB 3 MG 3 MG: 3 TAB at 20:40

## 2019-12-24 ASSESSMENT — ACTIVITIES OF DAILY LIVING (ADL)
LAUNDRY: WITH SUPERVISION
ORAL_HYGIENE: INDEPENDENT
DRESS: INDEPENDENT
ORAL_HYGIENE: INDEPENDENT
LAUNDRY: WITH SUPERVISION
HYGIENE/GROOMING: INDEPENDENT
DRESS: INDEPENDENT
HYGIENE/GROOMING: INDEPENDENT

## 2019-12-24 ASSESSMENT — MIFFLIN-ST. JEOR: SCORE: 1319.93

## 2019-12-24 NOTE — PROGRESS NOTES
Pt in [facility] majority of shift  Pt interacted with peers and staff appropriately  Pt ate dinner and watched tv   Pt did not attend group  Pt denies SI/SIB/ and hallucinations  Pt endorses anxiety and a desire to leave  Pt independent with ADLs         12/23/19 2000   Behavioral Health   Hallucinations denies / not responding to hallucinations   Thinking poor concentration;distractable   Orientation person: oriented;place: oriented;date: oriented;time: oriented   Memory baseline memory   Insight poor   Judgement impaired   Eye Contact into space;at floor   Affect blunted, flat   Mood mood is calm   Physical Appearance/Attire appears stated age   1. Wish to be Dead (Recent) No   2. Non-Specific Active Suicidal Thoughts (Recent) No   3. Active Sucidal Ideation with any Methods (Not Plan) Without Intent to Act (Recent) No   4. Active Suicidal Ideation with Some Intent to Act, Without Specific Plan (Recent) No   5. Active Suicidal Ideation with Specific Plan and Intent (Recent) No   Activity isolative;withdrawn   Psychomotor / Gait balanced;steady   Psycho Education   Type of Intervention 1:1 intervention   Response observes from a distance   Hours 0.5   Suicide Risk Assessment   Do you take chances with your safety (drugs/alcohol, neglecting health issues, driving unsafely, unsafe sex)? No

## 2019-12-24 NOTE — PROGRESS NOTES
"Father of pt called to get an update.  He reports that pt has been calling him to come and get her.  He had questions about her recovery and visiting hours.  Father can be reached at [phone], name is Skinny.    Father reports that she 'has been an opiate abuser for some time and recently meth\".    "

## 2019-12-24 NOTE — PROGRESS NOTES
" 12/24/19 2100   Art Therapy   Type of Intervention structured groups   Response participates with encouragement   Hours 1   Treatment Detail    (Art Therapy- holiday fiber art / mixed media collage/ hot indy and wishes)   Goal- cope, express, regulate and reflect through Art Therapy directives.     Outcome- Pt reported feeling \" OK\". She had a very flat affect. She did say when asked what her wishes were, she said \" I have quite a few.\"   Patients in the group expressed having hot chocolate would make their holiday feel better, writer arranged that and making wish boxes for the afternoon.    She declined hot chocolate but did make a wish box. .           "

## 2019-12-24 NOTE — PROGRESS NOTES
Pt was isolative and withdrawn for the first half of this shift resting in her room. Pt later came out to the lounge and made some phone calls to her dad. Pt requested and took a shower, ate lunch and spent some time in the milieu watching TV. Pt would like to know when she could  be discharged.Calm and cooperative on a approach and during interactions. No further concerns.       12/24/19 1324   Behavioral Health   Hallucinations denies / not responding to hallucinations   Thinking poor concentration   Orientation person: oriented;place: oriented;date: oriented;time: oriented   Memory baseline memory   Insight insight appropriate to situation   Judgement impaired   Eye Contact at examiner   Affect blunted, flat   Mood mood is calm   Hygiene well groomed   Suicidality other (see comments)  (Pt denies)   1. Wish to be Dead (Recent) No   Wish to be Dead Description (Recent)   (None states or observed.)   2. Non-Specific Active Suicidal Thoughts (Recent) No   Self Injury other (see comment)  (None stated or observed.)   Activity isolative;withdrawn   Speech clear;coherent   Medication Sensitivity no stated side effects;no observed side effects   Psychomotor / Gait balanced;steady   Psycho Education   Type of Intervention 1:1 intervention   Response participates, initiates socially appropriate   Hours 0.5   Treatment Detail   (check in / observation )   Activities of Daily Living   Hygiene/Grooming independent   Oral Hygiene independent   Dress independent   Laundry with supervision   Room Organization independent

## 2019-12-25 PROCEDURE — 25000132 ZZH RX MED GY IP 250 OP 250 PS 637: Performed by: PSYCHIATRY & NEUROLOGY

## 2019-12-25 PROCEDURE — 25000132 ZZH RX MED GY IP 250 OP 250 PS 637: Performed by: NURSE PRACTITIONER

## 2019-12-25 PROCEDURE — 12400001 ZZH R&B MH UMMC

## 2019-12-25 RX ADMIN — NICOTINE POLACRILEX 2 MG: 2 GUM, CHEWING BUCCAL at 10:35

## 2019-12-25 RX ADMIN — Medication 100 MG: at 08:14

## 2019-12-25 RX ADMIN — NICOTINE 1 PATCH: 21 PATCH, EXTENDED RELEASE TRANSDERMAL at 08:14

## 2019-12-25 RX ADMIN — MELATONIN TAB 3 MG 3 MG: 3 TAB at 21:33

## 2019-12-25 RX ADMIN — MULTIPLE VITAMINS W/ MINERALS TAB 1 TABLET: TAB at 08:14

## 2019-12-25 RX ADMIN — IBUPROFEN 600 MG: 600 TABLET ORAL at 10:35

## 2019-12-25 RX ADMIN — TRAZODONE HYDROCHLORIDE 50 MG: 50 TABLET ORAL at 21:33

## 2019-12-25 RX ADMIN — IBUPROFEN 600 MG: 600 TABLET ORAL at 21:33

## 2019-12-25 RX ADMIN — OLANZAPINE 10 MG: 10 TABLET, FILM COATED ORAL at 21:33

## 2019-12-25 RX ADMIN — NICOTINE POLACRILEX 2 MG: 2 GUM, CHEWING BUCCAL at 21:34

## 2019-12-25 RX ADMIN — FOLIC ACID 1 MG: 1 TABLET ORAL at 08:14

## 2019-12-25 ASSESSMENT — ACTIVITIES OF DAILY LIVING (ADL)
HYGIENE/GROOMING: INDEPENDENT
ORAL_HYGIENE: INDEPENDENT
DRESS: INDEPENDENT
LAUNDRY: WITH SUPERVISION

## 2019-12-25 NOTE — PLAN OF CARE
Pt was visible in milieu, minimally social with peers but watched tv in the lounge. Pt received prn ibuprofen for some lower back pain. Affect was blunted, flat. Mood was calm. No SI/SIB noted. Pt is wanting to be discharged soon. Will continue to monitor.

## 2019-12-25 NOTE — PLAN OF CARE
"Patient has divided her time between her room and the lounge, medication compliant, attends select groups, however, some times doesn't participate or leaves early.  Report her mood as \"could be better could be worse\" but did not wish to elaborate on that statement.  Did request and receive Tylenol with HS meds for chronic low back pain.  Denies any other complaints.  Eye contact is good this evening.  Denies SI/SIB AH/VH.  "

## 2019-12-26 VITALS
HEIGHT: 66 IN | RESPIRATION RATE: 16 BRPM | DIASTOLIC BLOOD PRESSURE: 83 MMHG | WEIGHT: 154.9 LBS | SYSTOLIC BLOOD PRESSURE: 118 MMHG | TEMPERATURE: 98.9 F | OXYGEN SATURATION: 99 % | HEART RATE: 87 BPM | BODY MASS INDEX: 24.89 KG/M2

## 2019-12-26 PROCEDURE — 99239 HOSP IP/OBS DSCHRG MGMT >30: CPT | Performed by: NURSE PRACTITIONER

## 2019-12-26 PROCEDURE — 25000132 ZZH RX MED GY IP 250 OP 250 PS 637: Performed by: NURSE PRACTITIONER

## 2019-12-26 PROCEDURE — 99207 ZZC CDG-CHARGE REQUIRED MANUAL ENTRY: CPT | Performed by: NURSE PRACTITIONER

## 2019-12-26 PROCEDURE — 25000132 ZZH RX MED GY IP 250 OP 250 PS 637: Performed by: PSYCHIATRY & NEUROLOGY

## 2019-12-26 RX ORDER — OLANZAPINE 10 MG/1
10 TABLET ORAL AT BEDTIME
Qty: 30 TABLET | Refills: 1 | Status: SHIPPED | OUTPATIENT
Start: 2019-12-26 | End: 2023-02-20

## 2019-12-26 RX ORDER — SERTRALINE HYDROCHLORIDE 100 MG/1
TABLET, FILM COATED ORAL
Start: 2019-12-26 | End: 2023-02-20

## 2019-12-26 RX ADMIN — FOLIC ACID 1 MG: 1 TABLET ORAL at 09:12

## 2019-12-26 RX ADMIN — Medication 100 MG: at 09:13

## 2019-12-26 RX ADMIN — MULTIPLE VITAMINS W/ MINERALS TAB 1 TABLET: TAB at 09:12

## 2019-12-26 RX ADMIN — NICOTINE 1 PATCH: 21 PATCH, EXTENDED RELEASE TRANSDERMAL at 09:13

## 2019-12-26 RX ADMIN — IBUPROFEN 600 MG: 600 TABLET ORAL at 09:16

## 2019-12-26 ASSESSMENT — ACTIVITIES OF DAILY LIVING (ADL)
HYGIENE/GROOMING: SHOWER;INDEPENDENT
DRESS: INDEPENDENT
ORAL_HYGIENE: INDEPENDENT
LAUNDRY: WITH SUPERVISION

## 2019-12-26 ASSESSMENT — MIFFLIN-ST. JEOR: SCORE: 1369.37

## 2019-12-26 NOTE — PLAN OF CARE
"Patient alert and oriented today to person, place, time, situation. Stated mood \"fine\". Affect blunted. She has been out in the milieu. Ate breakfast and showered. Social with peers. Less irritable. No SI or self harm ideation. She denied any medication side effects. Is now considering CD treatment. She signed in voluntary and 72 hold was discontinued. Patient has orders to discharge to home. See AVS for discharge plan. Reviewed all discharge instructions, medications and follow-up plan with patient. Patient agrees with plan to discharge.     "

## 2019-12-26 NOTE — DISCHARGE INSTRUCTIONS
Behavioral Discharge Planning and Instructions      Summary:  You were admitted on 12/21/2019  due to Psychotic Symptomology and Chemical Use Issues.  You were treated by Silvia Palmer NP and discharged on 12/26/19 from [facility] Home      Principal Diagnosis: Substance-induced psychotic disorder  Methamphetamine use disorder, severe      Health Care Follow-up Appointments:   You were encouraged to follow up with your primary care provider and to follow up with chemically dependency treatment.  Please complete the Rule 25 assessment form provided to you and either send it in or bring it to the following address to engage in further servies:    [address], Room 901 -sh- [address] [place] [address]     Attend all scheduled appointments with your outpatient providers. Call at least 24 hours in advance if you need to reschedule an appointment to ensure continued access to your outpatient providers.   Major Treatments, Procedures and Findings:  You were provided with: a psychiatric assessment, assessed for medical stability, medication evaluation and/or management and milieu management    Symptoms to Report: feeling more aggressive, increased confusion, losing more sleep or mood getting worse    Early warning signs can include: increased depression or anxiety increased thoughts or behaviors of suicide or self-harm  increased unusual thinking, such as paranoia or hearing voices    Safety and Wellness:  Take all medicines as directed.  Make no changes unless your doctor suggests them.      Follow treatment recommendations.  Refrain from alcohol and non-prescribed drugs.  If there is a concern for safety, call 911.    Resources:   Crisis Intervention: 334.446.9640 or 930-028-4628 (TTY: 167.219.6322).  Call anytime for help.  National [employer]a on Mental Illness (www.[place].shell.org): 905.475.6950 or 296-149-4268.  Suicide Awareness  "Voices of Education (SAVE) (www.save.org): 301-372-WUTY (8983)  National Suicide Prevention Line (www.mentalhealthmn.org): 418-238-IKBK (1642)  Mental Health Consumer/Survivor Network of [place] (www.mhcsn.net): 493.451.9050 or 730-834-1121  Mental Health Association of [place] (www.mentalhealth.org): 125.505.3792 or 864-431-4768  Self- Management and Recovery Training., SMART-- Toll free: 871.546.7612  www.Koa.la  Text 4 Life: txt \"LIFE\" to 88484 for immediate support and crisis intervention  Crisis text line: Text \"[place]\" to 787213. Free, confidential, 24/7.  Crisis Intervention: 365.619.4447 or 599-545-6528. Call anytime for help.   [facility] Crisis: 6-178-478-9820       The treatment team has appreciated the opportunity to work with you.     If you have any questions or concerns our unit number is [phone]        "

## 2019-12-26 NOTE — PROGRESS NOTES
Patient isolative to room for a large portion of shift, though she did come out for group and to watch a little television. She was visited by a male friend for 45 minutes. She is pleasant upon approach. It seemed as though she made some of her needs known, asking for a yoga mat and a radio headset. It appears as though she did a little stretching. During group, she got up and left partway through the group without explanation and left her project. She checked in with writer and her answers were short. Writer didn't get through all his questions as she said she was tired. Denies SI/SIB.        12/25/19 6644   Behavioral Health   Hallucinations other (see comment)  ([place])   Thinking distractable   Orientation person: oriented;place: oriented   Memory   ([place])   Insight   ([place])   Judgement impaired   Eye Contact at examiner;into space   Affect blunted, flat   Mood mood is calm   Physical Appearance/Attire other (see comment)  (acceptable)   Hygiene other (see comment)  (acceptable)   Suicidality other (see comments)  (denies)   1. Wish to be Dead (Recent) No   2. Non-Specific Active Suicidal Thoughts (Recent) No   Change in Protective Factors? No   Enviromental Risk Factors None   Self Injury other (see comment)  (denies)   Elopement Statements about wanting to leave   Activity isolative;withdrawn   Speech clear;other (see comments)  (delayed response)   Psychomotor / Gait balanced;steady   Psycho Education   Type of Intervention 1:1 intervention   Response participates with encouragement   Hours 0.5   Treatment Detail check-in

## 2019-12-26 NOTE — DISCHARGE SUMMARY
"Psychiatric Discharge Summary    Serenity Jolly MRN# [id number]   Age: 44 year old YOB: 1975     Date of Admission:  12/21/2019  Date of Discharge:  12/26/2019  Admitting Physician:  Dean Burger MD  Discharge Physician:  STACY Paniagua CNP (Contact: [phone])         Event Leading to Hospitalization:      Serenity \"Shawna\" Rik is a 44-year-old female admitted to [address] on 12/21/2019.  She was admitted on a 72-hour hold through the ER, brought in via EMS due to psychosis.  She was disorganized and experiencing paranoid thought content.  She was most recently hospitalized on 3A detox in November 2019 and left A.  She has a history of abusing cannabis, meth, alcohol, heroin and benzos.  UTOX was positive for amphetamines.  She had not been taking any medications prior to admission.      From H&P 12/22/2019:    Serenity Jolly is a 44 year old female with history of polysubstance abuse including alcohol, methamphetamines, marijuana, heroin, benzodiazepines, and unspecified psychotic disorder.  The patient was brought to the emergency room by EMS.  She was very disorganized, confused, and not making sense.  She made bizarre statements.  The patient was  in detox in November 2019.  She was discharged to [facility], but self discharged after 5 days.  She relapsed almost immediately.  Her sister reports paranoia and psychosis even when not using drugs.    The patient is a poor historian.  She is not able to respond to most questions.  She is falling asleep while talking.  The patient reports that she is in the hospital due to paranoia. she is afraid that her children will get hurt or she will get killed. She has been paranoid for years.  She was not able to say if the paranoia is related to the meth use. Denies auditory visual hallucinations.  The patient rates the depression as \"not much\".  Her sleep is poor, averaging 3 hours a night.  She is " "not taking naps during the day.  Her energy is low, memory and concentration impaired.  Her appetite is normal.  Reports psychomotor agitation.  The anxiety is  \"not bad\".  Was not able to respond to questions regarding manic episodes, and panic attacks.  Reports history of PTSD however,did not elaborate.  According to her chart, she has been abused and has been having nightmares, flashbacks, and trust issues.  She reported being very jumpy.  Denies suicidal homicidal ideation.  Denies history of OCD, eating disorders, borderline personality disorder.  Denies suicide attempts.  When asked about self injury behaviors, patient responded \"probably\".  Currently does not have a psychiatrist.  She stopped her medications 3 or 4 weeks ago.  Did not think that the medications are helpful.  The patient is unable to identify goal for     See full admission note by STACY Starks DNP on 12/22/2019 for details.           Diagnoses:     Substance-induced psychotic disorder  Methamphetamine use disorder, severe  Alcohol use disorder, severe  History of abusing benzodiazepines, heroin, marijuana, and many other substances  Nicotine use disorder         Labs:      Ref. Range 12/21/2019 01:43 12/21/2019 16:51 12/24/2019 07:34   Sodium Latest Ref Range: 133 - 144 mmol/L   141   Potassium Latest Ref Range: 3.4 - 5.3 mmol/L   3.9   Chloride Latest Ref Range: 94 - 109 mmol/L   107   Carbon Dioxide Latest Ref Range: 20 - 32 mmol/L   27   Urea Nitrogen Latest Ref Range: 7 - 30 mg/dL   9   Creatinine Latest Ref Range: 0.52 - 1.04 mg/dL   0.64   GFR Estimate Latest Ref Range: >60 mL/min/1.73_m2   >90   GFR Estimate If Black Latest Ref Range: >60 mL/min/1.73_m2   >90   Calcium Latest Ref Range: 8.5 - 10.1 mg/dL   8.2 (L)   Anion Gap Latest Ref Range: 3 - 14 mmol/L   7   Albumin Latest Ref Range: 3.4 - 5.0 g/dL   3.4   Protein Total Latest Ref Range: 6.8 - 8.8 g/dL   6.3 (L)   Bilirubin Total Latest Ref Range: 0.2 - 1.3 mg/dL   0.2 "   Alkaline Phosphatase Latest Ref Range: 40 - 150 U/L   63   ALT Latest Ref Range: 0 - 50 U/L   17   AST Latest Ref Range: 0 - 45 U/L   12   Cholesterol Latest Ref Range: <200 mg/dL   174   HCG Qual Urine Latest Ref Range: NEG^Negative   Negative    HDL Cholesterol Latest Ref Range: >49 mg/dL   53   LDL Cholesterol Calculated Latest Ref Range: <100 mg/dL   108 (H)   Non HDL Cholesterol Latest Ref Range: <130 mg/dL   121   T4 Free Latest Ref Range: 0.76 - 1.46 ng/dL   1.07   Triglycerides Latest Ref Range: <150 mg/dL   64   TSH Latest Ref Range: 0.40 - 4.00 mU/L   0.32 (L)   Glucose Latest Ref Range: 70 - 99 mg/dL   89   WBC Latest Ref Range: 4.0 - 11.0 10e9/L   4.3   Hemoglobin Latest Ref Range: 11.7 - 15.7 g/dL   12.7   Hematocrit Latest Ref Range: 35.0 - 47.0 %   39.1   Platelet Count Latest Ref Range: 150 - 450 10e9/L   249   RBC Count Latest Ref Range: 3.8 - 5.2 10e12/L   3.89   MCV Latest Ref Range: 78 - 100 fl   101 (H)   MCH Latest Ref Range: 26.5 - 33.0 pg   32.6   MCHC Latest Ref Range: 31.5 - 36.5 g/dL   32.5   RDW Latest Ref Range: 10.0 - 15.0 %   12.6   Diff Method Unknown   Automated Method   % Neutrophils Latest Units: %   45.8   % Lymphocytes Latest Units: %   39.4   % Monocytes Latest Units: %   7.9   % Eosinophils Latest Units: %   6.0   % Basophils Latest Units: %   0.9   % Immature Granulocytes Latest Units: %   0.0   Nucleated RBCs Latest Ref Range: 0 /100   0   Absolute Neutrophil Latest Ref Range: 1.6 - 8.3 10e9/L   2.0   Absolute Lymphocytes Latest Ref Range: 0.8 - 5.3 10e9/L   1.7   Absolute Monocytes Latest Ref Range: 0.0 - 1.3 10e9/L   0.3   Absolute Eosinophils Latest Ref Range: 0.0 - 0.7 10e9/L   0.3   Absolute Basophils Latest Ref Range: 0.0 - 0.2 10e9/L   0.0   Abs Immature Granulocytes Latest Ref Range: 0 - 0.4 10e9/L   0.0   Absolute Nucleated RBC Unknown   0.0   Hepatitis C Antibody Latest Ref Range: NR^Nonreactive    Nonreactive   HIV Antigen Antibody Combo Latest Ref Range:  NR^Nonreactive       Nonreactive   Alcohol Breath Test Latest Ref Range: 0.00 - 0.01  0.00     Amphetamine Qual Urine Latest Ref Range: NEG^Negative   Positive (A)    Cocaine Qual Urine Latest Ref Range: NEG^Negative   Negative    Opiates Qualitative Urine Latest Ref Range: NEG^Negative   Negative    Cannabinoids Qual Urine Latest Ref Range: NEG^Negative   Negative    Barbiturates Qual Urine Latest Ref Range: NEG^Negative   Negative    Benzodiazepine Qual Urine Latest Ref Range: NEG^Negative   Negative    Ethanol Qual Urine Latest Ref Range: NEG^Negative   Negative             Consults:     No consultations were requested during this admission.         Hospital Course:     Serenity Jolly was admitted to [facility] with attending Dean Burger MD, under the direct care of Silvia Palmer NP on a 72 hour mental health hold. The patient was placed under status 15 (15 minute checks) to ensure patient safety.     MSSA protocol was initiated due to the patient's history of alcohol abuse and concern for withdrawal symptoms.  She did not require treatment with Valium, and the MSSA was discontinued.      Zyprexa was initiated.  PRN Melatonin was continued.  She was advised to restart Zoloft upon discharge.  She tolerated her medications well, without complaints of side effects.      Serenity Jolly initially spent much of her time in her room, but as she improved she did participate in groups and was visible in the milieu.  Behavior was calm and cooperative.  She was strongly advised to consider residential CD treatment but declined, stating she wanted to be able to spend time with her children daily.  She resides in [place] where there are few resources for outpatient treatment.  On the day of discharge she indicated she might be willing to go to residential treatment and was provided with resources.      The patient's symptoms of psychosis improved.  She denied auditory hallucinations.  Thoughts were better  organized.  She denied ideas of reference.  She ate and slept well.  She denied suicidal ideation.  She reported her mood was mildly depressed.      Serenity Jolly's 72-hour hold was discontinued after she no longer met criteria, and she was released to home with her parents and children.  At the time of discharge Serenity Jolly was determined to not be a danger to herself or others.          Discharge Medications:      Serenity Jolly   Home Medication Instructions MARLEEN:[id number]    Printed on:12/26/19 1211   Medication Information                      acetaminophen (TYLENOL) 325 MG tablet  Take 325-650 mg by mouth every 4 hours as needed for mild pain             alum & mag hydroxide-simethicone (MYLANTA/MAALOX) 200-200-20 MG/5ML SUSP suspension  Take 30 mLs by mouth every 6 hours as needed for indigestion             ibuprofen (ADVIL/MOTRIN) 200 MG tablet  Take 400 mg by mouth every 6 hours as needed for mild pain             loratadine (CLARITIN) 10 MG tablet  Take 10 mg by mouth daily as needed for allergies             melatonin 3 MG tablet  Take 3 mg by mouth nightly as needed for sleep             multivitamin w/minerals (THERA-VIT-M) tablet  Take 1 tablet by mouth daily             OLANZapine (ZYPREXA) 10 MG tablet  Take 1 tablet (10 mg) by mouth At Bedtime             senna-docusate (SENOKOT-S/PERICOLACE) 8.6-50 MG tablet  Take 2 tablets by mouth daily as needed for constipation             sertraline (ZOLOFT) 100 MG tablet  Take 1/2 tablet (50 mg ) by mouth daily x 1 week and then take 1 tablet (100 mg) daily.                      Psychiatric Examination:     Appearance:  awake, alert and adequately groomed  Attitude:  cooperative  Eye Contact:  good  Mood:  somewhat depressed  Affect:  appropriate and in normal range  Speech:  clear, coherent  Psychomotor Behavior:  no evidence of tardive dyskinesia, dystonia, or tics  Thought Process:  mostly linear, goal-oriented  Associations:  no  "loose associations  Thought Content:  no evidence of suicidal ideation or homicidal ideation, denies hallucinations, denies ideas of reference  Insight:  fair  Judgment:  fair  Oriented to:  date, time, person, and place  Attention Span and Concentration:  fair, improved  Recent and Remote Memory:  fair  Language:  intact  Fund of Knowledge:  appropriate  Muscle Strength and Tone: normal  Gait and Station:  normal    /78   Pulse 80   Temp 97.9  F (36.6  C) (Oral)   Resp 16   Ht 1.676 m (5' 6\")   Wt 65.3 kg (144 lb)   SpO2 99%   BMI 23.24 kg/m           Discharge Plan:     Per Discharge AVS:      Health Care Follow-up Appointments:     You were encouraged to follow up with your primary care provider and to follow up with chemically dependency treatment.  Please complete the Rule 25 assessment form provided to you and either send it in or bring it to the following address to engage in further servies:    [address], Room 757 -pl- 100[address] [place] [address]     A 30-day supply of Zyprexa was e-prescribed at [facility] in [place].  She has an ample supply of other medications.  She was advised to take her medications as prescribed and to abstain from alcohol and illicit substances.        Clinical Global Impressions  First:  Considering your total clinical experience with this particular patient population, how severe are the patient's symptoms at this time?: 7 (12/23/19 0726)  Compared to the patient's condition at the START of treatment, this patient's condition is:: 4 (12/23/19 0726)  Most recent:  Considering your total clinical experience with this particular patient population, how severe are the patient's symptoms at this time?: 4 (12/26/19 0819)  Compared to the patient's condition at the START of treatment, this patient's condition is:: 2 (12/26/19 0819)        Attestation:  The patient has been seen and " evaluated by me, STACY Paniagua CNP   Discharge time > 30 minutes

## 2019-12-26 NOTE — PROGRESS NOTES
12/25/19 2100   Art Therapy   Type of Intervention structured groups   Response Encouragement    Hours 1   Treatment Detail    (Art Therapy-  favian trees   Goal- cope, express, regulate and reflect through Art Therapy directives.     Outcome- Pt had a positive group. She left before the end of group without explanation and left her project. It was a nice detailed tree with hearts.tree. Her joys: Kids, grandkids, sun and ocean. Her affect was flat and blunted, she was quiet, but it was slightly brighter than yesterday.

## 2020-12-01 ENCOUNTER — HOSPITAL ENCOUNTER (EMERGENCY)
Facility: CLINIC | Age: 45
Discharge: HOME | End: 2020-12-01
Attending: NURSE PRACTITIONER | Admitting: NURSE PRACTITIONER
Payer: COMMERCIAL

## 2020-12-01 ENCOUNTER — APPOINTMENT (OUTPATIENT)
Dept: GENERAL RADIOLOGY | Facility: CLINIC | Age: 45
End: 2020-12-01
Attending: NURSE PRACTITIONER
Payer: COMMERCIAL

## 2020-12-01 VITALS
HEIGHT: 65 IN | DIASTOLIC BLOOD PRESSURE: 82 MMHG | BODY MASS INDEX: 22.82 KG/M2 | TEMPERATURE: 97.4 F | OXYGEN SATURATION: 100 % | SYSTOLIC BLOOD PRESSURE: 132 MMHG | HEART RATE: 88 BPM | WEIGHT: 137 LBS

## 2020-12-01 DIAGNOSIS — S81.011A KNEE LACERATION, RIGHT, INITIAL ENCOUNTER: ICD-10-CM

## 2020-12-01 DIAGNOSIS — S80.01XA CONTUSION OF RIGHT KNEE, INITIAL ENCOUNTER: ICD-10-CM

## 2020-12-01 PROCEDURE — G0463 HOSPITAL OUTPT CLINIC VISIT: HCPCS | Mod: 25 | Performed by: NURSE PRACTITIONER

## 2020-12-01 PROCEDURE — 250N000013 HC RX MED GY IP 250 OP 250 PS 637: Performed by: NURSE PRACTITIONER

## 2020-12-01 PROCEDURE — 99214 OFFICE O/P EST MOD 30 MIN: CPT | Mod: 25 | Performed by: NURSE PRACTITIONER

## 2020-12-01 PROCEDURE — 12034 INTMD RPR S/TR/EXT 7.6-12.5: CPT | Performed by: NURSE PRACTITIONER

## 2020-12-01 PROCEDURE — 73560 X-RAY EXAM OF KNEE 1 OR 2: CPT | Mod: RT

## 2020-12-01 RX ORDER — OXYCODONE AND ACETAMINOPHEN 5; 325 MG/1; MG/1
1 TABLET ORAL ONCE
Status: COMPLETED | OUTPATIENT
Start: 2020-12-01 | End: 2020-12-01

## 2020-12-01 RX ADMIN — OXYCODONE HYDROCHLORIDE AND ACETAMINOPHEN 1 TABLET: 5; 325 TABLET ORAL at 16:28

## 2020-12-01 ASSESSMENT — ENCOUNTER SYMPTOMS
NEUROLOGICAL NEGATIVE: 1
WOUND: 1
RESPIRATORY NEGATIVE: 1
BACK PAIN: 1
CARDIOVASCULAR NEGATIVE: 1

## 2020-12-01 ASSESSMENT — MIFFLIN-ST. JEOR: SCORE: 1267.31

## 2020-12-01 NOTE — ED PROVIDER NOTES
History     Chief Complaint   Patient presents with     Laceration     laceration to rt lateral knee     HPI  Serenity Jolly is a 45 year old female with history of psychosis, chemical dependency (heroin and etoh abuse), anxiety, and chronic back pain who presents to urgent care for evaluation of right lateral knee laceration. Patient was helping her father tear down a shed and a piece of metal/wall from the shed fell and clipped her in the right side of her knee/leg causing laceration and pain.Tetanus is UTD (2019).    Allergies:  Allergies   Allergen Reactions     Sulfa Drugs Hives       Problem List:    Patient Active Problem List    Diagnosis Date Noted     Psychosis (H) 2019     Priority: Medium     Chemical dependency (H) 2019     Priority: Medium     Alcohol abuse 2019     Priority: Medium     Heroin abuse (H) 2017     Priority: Medium     Ovarian cyst, bilateral 2015     Priority: Medium     Generalized anxiety disorder 2015     Priority: Medium     Diagnosis updated by automated process. Provider to review and confirm.          Past Medical History:    Past Medical History:   Diagnosis Date     Anxiety      Cerebral infarction (H)      Depressive disorder      Myocardial infarction (H)      Substance abuse (H)        Past Surgical History:    Past Surgical History:   Procedure Laterality Date     BREAST SURGERY      breast augmentation     COSMETIC SURGERY       ENT SURGERY       GYN SURGERY      drained R ovary       Family History:    History reviewed. No pertinent family history.    Social History:  Marital Status:   [4]  Social History     Tobacco Use     Smoking status: Current Every Day Smoker     Packs/day: 0.50     Last attempt to quit: 2019     Years since quittin.9     Smokeless tobacco: Never Used     Tobacco comment: Declines NRT at admission   Substance Use Topics     Alcohol use: No     Comment: socially     Drug use: Yes     Types:  "IV, Methamphetamines        Medications:         acetaminophen (TYLENOL) 325 MG tablet       alum & mag hydroxide-simethicone (MYLANTA/MAALOX) 200-200-20 MG/5ML SUSP suspension       ibuprofen (ADVIL/MOTRIN) 200 MG tablet       loratadine (CLARITIN) 10 MG tablet       melatonin 3 MG tablet       multivitamin w/minerals (THERA-VIT-M) tablet       OLANZapine (ZYPREXA) 10 MG tablet       senna-docusate (SENOKOT-S/PERICOLACE) 8.6-50 MG tablet       sertraline (ZOLOFT) 100 MG tablet          Review of Systems   Respiratory: Negative.    Cardiovascular: Negative.    Musculoskeletal: Positive for back pain (chronic).   Skin: Positive for wound.   Neurological: Negative.    As mentioned above in the history present illness. All other systems were reviewed and are negative.      Physical Exam   BP: 132/82  Pulse: 88  Temp: 97.4  F (36.3  C)  Height: 165.1 cm (5' 5\")  Weight: 62.1 kg (137 lb)  SpO2: 100 %      Physical Exam  Constitutional:       General: She is in acute distress.      Appearance: She is not ill-appearing.   Cardiovascular:      Rate and Rhythm: Normal rate.   Pulmonary:      Effort: Pulmonary effort is normal.   Musculoskeletal:      Right knee: She exhibits laceration (lateral right knee). She exhibits no swelling and no deformity.        Legs:    Skin:     Findings: Laceration (V shaped flap laceration 7cm x 5 cm to the lateral right knee. Depth down to the muscle) present.   Neurological:      General: No focal deficit present.      Mental Status: She is alert and oriented to person, place, and time.         [facility]     -Laceration Repair    Date/Time: 12/1/2020 4:35 PM  Performed by: Stacia Ng APRN CNP  Authorized by: Stacia Ng APRN CNP       ANESTHESIA (see MAR for exact dosages):     Anesthesia method:  Local infiltration    Local anesthetic:  Lidocaine 1% WITH epi and lidocaine 1% w/o epi (total 18 ml lidocaine--9 ml with epi " and 9 ml w/o epi)  LACERATION DETAILS     Location:  Leg    Leg location:  R knee    Length (cm):  12    REPAIR TYPE:     Repair type:  Intermediate      EXPLORATION:     Wound exploration: wound explored through full range of motion and entire depth of wound probed and visualized      Wound extent: no signs of injury      Contaminated: no      TREATMENT:     Area cleansed with:  Saline    Amount of cleaning:  Extensive    Irrigation solution:  Sterile saline    Irrigation volume:  500    Irrigation method:  Syringe    Visualized foreign bodies/material removed: no      SUBCUTANEOUS REPAIR     Suture size:  3-0    Suture material:  Vicryl    Suture technique:  Simple interrupted    Number of sutures:  2    SKIN REPAIR     Repair method:  Sutures    Suture size:  3-0    Suture material:  Nylon    Number of sutures:  10    APPROXIMATION     Approximation:  Close    POST-PROCEDURE DETAILS     Dressing:  Antibiotic ointment and adhesive bandage      PROCEDURE Describe Procedure: Patient tearful through entire procedure. I did additional incremental injections of lidocaine. Patient needing to change positions during the procedure due to her chronic back pain.                  Results for orders placed or performed during the hospital encounter of 12/01/20 (from the past 24 hour(s))   XR Knee Right 1/2 Views    Narrative    XR KNEE RT 1 /2 VW 12/1/2020 4:48 PM     HISTORY: heavy item fell and hit her lateral knee- laceration and  lateral pain.      Impression    IMPRESSION: No radiopaque foreign body. No apparent joint effusion or  fracture.    THOMAS TIJERINA MD       Medications   oxyCODONE-acetaminophen (PERCOCET) 5-325 MG per tablet 1 tablet (1 tablet Oral Given 12/1/20 1628)       Assessments & Plan (with Medical Decision Making)   Laceration as noted above. Tetanus is UTD. Repaired as noted above with 2 buried vicryl sutures to bring the subcutaneous tissue together and 10 Ethilon sutures. Xray is negative for acute  osseous abnormality.   Plan:    Ok to shower, but then dry the wound well and cover with bandage.  Change dressing twice a day until sutures are removed.  Sutures out in 10 days.  Ice packs.  Tylenol 650 mg every 4-6 hours as needed for pain.  Ibuprofen 400-600 mg every 6-8 hours as needed for pain  (take with food, stop if causing stomach pains.)  Return for increased redness, swelling, or drainage. (signs of infection)      I have reviewed the nursing notes.    I have reviewed the findings, diagnosis, plan and need for follow up with the patient.      New Prescriptions    No medications on file       Final diagnoses:   Knee laceration, right, initial encounter   Contusion of right knee, initial encounter       12/1/2020   [facility] EMERGENCY DEPT     Berenice, STACY Mitchell CNP  12/01/20 1655

## 2020-12-01 NOTE — ED AVS SNAPSHOT
[facility] Emergency Dept  [address] [address]  Phone: [phone]  Fax: [phone]                                    Serenity Jolly   MRN: [id number]    Department: [facility] Emergency Dept   Date of Visit: 12/1/2020           After Visit Summary Signature Page    I have received my discharge instructions, and my questions have been answered. I have discussed any challenges I see with this plan with the nurse or doctor.    ..........................................................................................................................................  Patient/Patient Representative Signature      ..........................................................................................................................................  Patient Representative Print Name and Relationship to Patient    ..................................................               ................................................  Date                                   Time    ..........................................................................................................................................  Reviewed by Signature/Title    ...................................................              ..............................................  Date                                               Time          22EPIC Rev 08/18

## 2020-12-01 NOTE — DISCHARGE INSTRUCTIONS
Ok to shower, but then dry the wound well and cover with bandage.  Change dressing twice a day until sutures are removed.  Sutures out in 10 days.  Ice packs.  Tylenol 650 mg every 4-6 hours as needed for pain.  Ibuprofen 400-600 mg every 6-8 hours as needed for pain  (take with food, stop if causing stomach pains.)  Return for increased redness, swelling, or drainage. (signs of infection)

## 2021-01-27 ENCOUNTER — HOSPITAL ENCOUNTER (EMERGENCY)
Facility: CLINIC | Age: 46
Discharge: HOME | End: 2021-01-27
Attending: NURSE PRACTITIONER | Admitting: NURSE PRACTITIONER
Payer: COMMERCIAL

## 2021-01-27 VITALS
SYSTOLIC BLOOD PRESSURE: 114 MMHG | HEIGHT: 66 IN | DIASTOLIC BLOOD PRESSURE: 80 MMHG | HEART RATE: 74 BPM | TEMPERATURE: 97.6 F | OXYGEN SATURATION: 98 % | WEIGHT: 140 LBS | BODY MASS INDEX: 22.5 KG/M2 | RESPIRATION RATE: 12 BRPM

## 2021-01-27 DIAGNOSIS — R07.9 CHEST PAIN, UNSPECIFIED TYPE: ICD-10-CM

## 2021-01-27 DIAGNOSIS — Z51.89 VISIT FOR WOUND CHECK: ICD-10-CM

## 2021-01-27 LAB
ANION GAP SERPL CALCULATED.3IONS-SCNC: 1 MMOL/L (ref 3–14)
BASOPHILS # BLD AUTO: 0.1 10E9/L (ref 0–0.2)
BASOPHILS NFR BLD AUTO: 1.5 %
BUN SERPL-MCNC: 15 MG/DL (ref 7–30)
CALCIUM SERPL-MCNC: 9.3 MG/DL (ref 8.5–10.1)
CHLORIDE SERPL-SCNC: 105 MMOL/L (ref 94–109)
CO2 SERPL-SCNC: 32 MMOL/L (ref 20–32)
CREAT SERPL-MCNC: 0.6 MG/DL (ref 0.52–1.04)
DIFFERENTIAL METHOD BLD: NORMAL
EOSINOPHIL # BLD AUTO: 0.2 10E9/L (ref 0–0.7)
EOSINOPHIL NFR BLD AUTO: 3.4 %
ERYTHROCYTE [DISTWIDTH] IN BLOOD BY AUTOMATED COUNT: 12 % (ref 10–15)
GFR SERPL CREATININE-BSD FRML MDRD: >90 ML/MIN/{1.73_M2}
GLUCOSE SERPL-MCNC: 89 MG/DL (ref 70–99)
HCT VFR BLD AUTO: 39.4 % (ref 35–47)
HGB BLD-MCNC: 12.8 G/DL (ref 11.7–15.7)
IMM GRANULOCYTES # BLD: 0 10E9/L (ref 0–0.4)
IMM GRANULOCYTES NFR BLD: 0.1 %
LYMPHOCYTES # BLD AUTO: 2.1 10E9/L (ref 0.8–5.3)
LYMPHOCYTES NFR BLD AUTO: 31.7 %
MCH RBC QN AUTO: 31.8 PG (ref 26.5–33)
MCHC RBC AUTO-ENTMCNC: 32.5 G/DL (ref 31.5–36.5)
MCV RBC AUTO: 98 FL (ref 78–100)
MONOCYTES # BLD AUTO: 0.5 10E9/L (ref 0–1.3)
MONOCYTES NFR BLD AUTO: 7 %
NEUTROPHILS # BLD AUTO: 3.8 10E9/L (ref 1.6–8.3)
NEUTROPHILS NFR BLD AUTO: 56.3 %
NRBC # BLD AUTO: 0 10*3/UL
NRBC BLD AUTO-RTO: 0 /100
PLATELET # BLD AUTO: 286 10E9/L (ref 150–450)
POTASSIUM SERPL-SCNC: 4.2 MMOL/L (ref 3.4–5.3)
RBC # BLD AUTO: 4.02 10E12/L (ref 3.8–5.2)
SODIUM SERPL-SCNC: 138 MMOL/L (ref 133–144)
TROPONIN I SERPL-MCNC: <0.015 UG/L (ref 0–0.04)
WBC # BLD AUTO: 6.8 10E9/L (ref 4–11)

## 2021-01-27 PROCEDURE — 93010 ELECTROCARDIOGRAM REPORT: CPT | Performed by: NURSE PRACTITIONER

## 2021-01-27 PROCEDURE — 99284 EMERGENCY DEPT VISIT MOD MDM: CPT | Performed by: NURSE PRACTITIONER

## 2021-01-27 PROCEDURE — 93005 ELECTROCARDIOGRAM TRACING: CPT | Performed by: NURSE PRACTITIONER

## 2021-01-27 PROCEDURE — 84484 ASSAY OF TROPONIN QUANT: CPT | Performed by: NURSE PRACTITIONER

## 2021-01-27 PROCEDURE — 80048 BASIC METABOLIC PNL TOTAL CA: CPT | Performed by: NURSE PRACTITIONER

## 2021-01-27 PROCEDURE — 85025 COMPLETE CBC W/AUTO DIFF WBC: CPT | Performed by: NURSE PRACTITIONER

## 2021-01-27 ASSESSMENT — ENCOUNTER SYMPTOMS
APPETITE CHANGE: 0
CONSTIPATION: 0
SHORTNESS OF BREATH: 0
CHILLS: 0
FATIGUE: 0
FEVER: 0
BACK PAIN: 1
ABDOMINAL PAIN: 0
NAUSEA: 0
COUGH: 0
BLOOD IN STOOL: 0
VOMITING: 0
DIARRHEA: 0

## 2021-01-27 ASSESSMENT — MIFFLIN-ST. JEOR: SCORE: 1296.79

## 2021-01-27 NOTE — ED PROVIDER NOTES
"  History     Chief Complaint   Patient presents with     Chest Pain     ongoing chest pain for 1 week. central/left side, chills      Cellulitis     cellulitis in right leg, staff infection, not healing, onging since Dec.     HPI  Serenity Jolly is a 45 year old female with history of CHF (with normalized EF 65% in 2016), ischemic chest pain (hx of cocaine), heroin abuse, polysubstance abuse (currently IV drug user), psychosis, general anxiety disorder who presents to the emergency department for evaluation of intermittent chest pain.  Patient reports having chest pain on and off ongoing for a \"long time\", but has been more frequent over the last week.  Pain is located on the left central chest region.  Patient is not having chest pain at this time.  Describes pain as sharp.Last IV drug use was 1-2 weeks ago, patient is vague about this and not sure what she is using but thinks it might be methamphetamine.      Additionally, patient complains of right lateral knee pain over a laceration that she suffered last month that developed infection shortly after sutures were removed 4 weeks ago. Pt was initially started on keflex but wound cx grew out resistance and patient was changed to a 7-day course of Levaquin.  Patient reports the area is still painful.    Allergies:  Allergies   Allergen Reactions     Sulfa Drugs Hives       Problem List:    Patient Active Problem List    Diagnosis Date Noted     Psychosis (H) 12/21/2019     Priority: Medium     Chemical dependency (H) 11/13/2019     Priority: Medium     Alcohol abuse 11/07/2019     Priority: Medium     Heroin abuse (H) 09/13/2017     Priority: Medium     Ovarian cyst, bilateral 07/27/2015     Priority: Medium     Generalized anxiety disorder 07/27/2015     Priority: Medium     Diagnosis updated by automated process. Provider to review and confirm.          Past Medical History:    Past Medical History:   Diagnosis Date     Anxiety      Cerebral infarction (H)  " "    Depressive disorder      Myocardial infarction (H)      Substance abuse (H)        Past Surgical History:    Past Surgical History:   Procedure Laterality Date     BREAST SURGERY      breast augmentation     COSMETIC SURGERY       ENT SURGERY       GYN SURGERY      drained R ovary       Family History:    No family history on file.    Social History:  Marital Status:   [4]  Social History     Tobacco Use     Smoking status: Current Every Day Smoker     Packs/day: 0.50     Last attempt to quit: 2019     Years since quittin.0     Smokeless tobacco: Never Used     Tobacco comment: Declines NRT at admission   Substance Use Topics     Alcohol use: No     Comment: socially     Drug use: Yes     Types: IV, Methamphetamines        Medications:         acetaminophen (TYLENOL) 325 MG tablet       alum & mag hydroxide-simethicone (MYLANTA/MAALOX) 200-200-20 MG/5ML SUSP suspension       ibuprofen (ADVIL/MOTRIN) 200 MG tablet       loratadine (CLARITIN) 10 MG tablet       melatonin 3 MG tablet       multivitamin w/minerals (THERA-VIT-M) tablet       OLANZapine (ZYPREXA) 10 MG tablet       senna-docusate (SENOKOT-S/PERICOLACE) 8.6-50 MG tablet       sertraline (ZOLOFT) 100 MG tablet          Review of Systems   Constitutional: Negative for appetite change, chills, fatigue and fever.   HENT: Negative for congestion.    Respiratory: Negative for cough and shortness of breath.    Cardiovascular: Positive for chest pain.   Gastrointestinal: Negative for abdominal pain, blood in stool, constipation, diarrhea, nausea and vomiting.   Genitourinary: Negative.    Musculoskeletal: Positive for back pain (chronic low back pain).   Skin: Negative.    All other systems reviewed and are negative.      Physical Exam   BP: 117/87  Pulse: 90  Temp: 97.6  F (36.4  C)  Resp: 18  Height: 167.6 cm (5' 6\")  Weight: 63.5 kg (140 lb)  SpO2: 100 %      Physical Exam  Constitutional:       General: She is not in acute distress.     " Appearance: She is well-developed. She is not ill-appearing.   HENT:      Head: Normocephalic and atraumatic.      Nose: Nose normal.      Mouth/Throat:      Mouth: Mucous membranes are moist.   Eyes:      General: No scleral icterus.     Conjunctiva/sclera: Conjunctivae normal.   Cardiovascular:      Rate and Rhythm: Normal rate and regular rhythm.   Pulmonary:      Effort: Pulmonary effort is normal.      Breath sounds: Normal breath sounds.   Chest:      Chest wall: No mass, tenderness or crepitus.   Abdominal:      General: There is no distension.      Palpations: Abdomen is soft.      Tenderness: There is no abdominal tenderness.   Musculoskeletal: Normal range of motion.   Skin:     General: Skin is warm and dry.      Comments: Right lateral knee:  --healing laceration. There is a 1 x 1.5 cm area where the wound had gaped open. This area is pink, normal granulation tissue. No necrotic tissue, no purulent drainage. No surrounding erythema or swelling.  Appears to be healing nicely.  Tenderness with palpation, no fluctuance.   Neurological:      General: No focal deficit present.      Mental Status: She is alert and oriented to person, place, and time.         ED Course        Procedures               EKG Interpretation:      Interpreted by Stacia Ng, STACY BAE and Dr. Whipple.  Time reviewed:1444   Symptoms at time of EKG: None   Rhythm: Normal sinus   Rate: Normal  Axis: Normal  Ectopy: None  Conduction: Normal  ST Segments/ T Waves: No ST-T wave changes and No acute ischemic changes  Q Waves: None  Comparison to prior: Unchanged from 10/13/2018    Clinical Impression: normal EKG       Results for orders placed or performed during the hospital encounter of 01/27/21 (from the past 24 hour(s))   CBC with platelets differential   Result Value Ref Range    WBC 6.8 4.0 - 11.0 10e9/L    RBC Count 4.02 3.8 - 5.2 10e12/L    Hemoglobin 12.8 11.7 - 15.7 g/dL    Hematocrit 39.4 35.0 - 47.0 %    MCV 98 78 -  "100 fl    MCH 31.8 26.5 - 33.0 pg    MCHC 32.5 31.5 - 36.5 g/dL    RDW 12.0 10.0 - 15.0 %    Platelet Count 286 150 - 450 10e9/L    Diff Method Automated Method     % Neutrophils 56.3 %    % Lymphocytes 31.7 %    % Monocytes 7.0 %    % Eosinophils 3.4 %    % Basophils 1.5 %    % Immature Granulocytes 0.1 %    Nucleated RBCs 0 0 /100    Absolute Neutrophil 3.8 1.6 - 8.3 10e9/L    Absolute Lymphocytes 2.1 0.8 - 5.3 10e9/L    Absolute Monocytes 0.5 0.0 - 1.3 10e9/L    Absolute Eosinophils 0.2 0.0 - 0.7 10e9/L    Absolute Basophils 0.1 0.0 - 0.2 10e9/L    Abs Immature Granulocytes 0.0 0 - 0.4 10e9/L    Absolute Nucleated RBC 0.0    Basic metabolic panel   Result Value Ref Range    Sodium 138 133 - 144 mmol/L    Potassium 4.2 3.4 - 5.3 mmol/L    Chloride 105 94 - 109 mmol/L    Carbon Dioxide 32 20 - 32 mmol/L    Anion Gap 1 (L) 3 - 14 mmol/L    Glucose 89 70 - 99 mg/dL    Urea Nitrogen 15 7 - 30 mg/dL    Creatinine 0.60 0.52 - 1.04 mg/dL    GFR Estimate >90 >60 mL/min/[1.73_m2]    GFR Estimate If Black >90 >60 mL/min/[1.73_m2]    Calcium 9.3 8.5 - 10.1 mg/dL   Troponin I   Result Value Ref Range    Troponin I ES <0.015 0.000 - 0.045 ug/L       Medications - No data to display    Assessments & Plan (with Medical Decision Making)      45 year old female with history of CHF (with normalized EF 65% in 2016), ischemic chest pain (hx of cocaine), heroin abuse, polysubstance abuse (currently IV drug user), psychosis, general anxiety disorder who presents to the emergency department for evaluation of intermittent chest pain.  Patient reports having chest pain on and off ongoing for a \"long time\", but has been more frequent over the last week.  Pain is located on the left central chest region.  Patient is not having chest pain at this time.  Describes pain as sharp. Complaining of pain over the lateral right knee where she has a right knee wound/laceration that occurred early last month and developed an infection, treated with " Levaquin. Last IV drug use was 1-2 weeks ago, patient is vague about this and not sure what she is using but thinks it might be methamphetamine.    On exam patient is alert and oriented.  No acute distress.  Afebrile.  Normotensive.  No tachycardia.  Lung sounds are CTA without increased work of breathing.  No tachypnea.  No hypoxia.  Tenderness with palpation to the right lateral knee where a laceration occurred early last month.  The area appears to be healing well.  No evidence of secondary infection.  EKG is normal.  Labs are unremarkable.  I have low suspicion for cardiac cause for her pain.  Plan:  Your right knee wound appears to be healing well. Continue to monitor for any surrounding redness, swelling, or discharge.   Your EKG is normal.  Your look good.  Follow-up for recheck with your regular provider in clinic.  Return increased chest pain, shortness of breath, vomiting, or any new symptoms of concern.    I have reviewed the nursing notes.    I have reviewed the findings, diagnosis, plan and need for follow up with the patient.      Discharge Medication List as of 1/27/2021  2:55 PM          Final diagnoses:   Chest pain, unspecified type   Visit for wound check       1/27/2021   [facility] EMERGENCY DEPT     Stacia Ng APRN CNP  01/27/21 [id number]       Stacia Ng APRN CNP  01/27/21 2991

## 2021-01-27 NOTE — ED NOTES
Pt c/o cp on/off for past few weeks.  No soa, dizziness, n/v/d.  No fevers.  Pt has hx of CHF and MI.  Pt also wants MD to look at scar on rt side of knee that has been infected and not healing according to pt. Pt on 2 different abx for it.  Area on leg slightly painful.  Pt admits to IV drug use and last use was 1 week ago.

## 2021-01-27 NOTE — DISCHARGE INSTRUCTIONS
Your right knee wound appears to be healing well. Continue to monitor for any surrounding redness, swelling, or discharge.   Your EKG is normal.  Your look good.  Follow-up for recheck with your regular provider in clinic.  Return increased chest pain, shortness of breath, vomiting, or any new symptoms of concern.

## 2022-02-23 ENCOUNTER — HOSPITAL ENCOUNTER (EMERGENCY)
Facility: CLINIC | Age: 47
Discharge: HOME | End: 2022-02-23
Attending: EMERGENCY MEDICINE | Admitting: EMERGENCY MEDICINE
Payer: COMMERCIAL

## 2022-02-23 ENCOUNTER — APPOINTMENT (OUTPATIENT)
Dept: GENERAL RADIOLOGY | Facility: CLINIC | Age: 47
End: 2022-02-23
Attending: EMERGENCY MEDICINE
Payer: COMMERCIAL

## 2022-02-23 VITALS
TEMPERATURE: 97.5 F | RESPIRATION RATE: 12 BRPM | HEIGHT: 65 IN | DIASTOLIC BLOOD PRESSURE: 74 MMHG | SYSTOLIC BLOOD PRESSURE: 132 MMHG | OXYGEN SATURATION: 99 % | BODY MASS INDEX: 26.33 KG/M2 | WEIGHT: 158 LBS | HEART RATE: 70 BPM

## 2022-02-23 DIAGNOSIS — R74.8 ELEVATED LIPASE: ICD-10-CM

## 2022-02-23 DIAGNOSIS — R07.9 ACUTE CHEST PAIN: ICD-10-CM

## 2022-02-23 LAB
ALBUMIN SERPL-MCNC: 4.5 G/DL (ref 3.4–5)
ALP SERPL-CCNC: 80 U/L (ref 40–150)
ALT SERPL W P-5'-P-CCNC: 20 U/L (ref 0–50)
ANION GAP SERPL CALCULATED.3IONS-SCNC: 6 MMOL/L (ref 3–14)
AST SERPL W P-5'-P-CCNC: 10 U/L (ref 0–45)
BASOPHILS # BLD AUTO: 0.1 10E3/UL (ref 0–0.2)
BASOPHILS NFR BLD AUTO: 1 %
BILIRUB SERPL-MCNC: 0.3 MG/DL (ref 0.2–1.3)
BUN SERPL-MCNC: 13 MG/DL (ref 7–30)
CALCIUM SERPL-MCNC: 9.4 MG/DL (ref 8.5–10.1)
CHLORIDE BLD-SCNC: 105 MMOL/L (ref 94–109)
CO2 SERPL-SCNC: 31 MMOL/L (ref 20–32)
CREAT SERPL-MCNC: 0.69 MG/DL (ref 0.52–1.04)
EOSINOPHIL # BLD AUTO: 0.4 10E3/UL (ref 0–0.7)
EOSINOPHIL NFR BLD AUTO: 6 %
ERYTHROCYTE [DISTWIDTH] IN BLOOD BY AUTOMATED COUNT: 12.2 % (ref 10–15)
GFR SERPL CREATININE-BSD FRML MDRD: >90 ML/MIN/1.73M2
GLUCOSE BLD-MCNC: 91 MG/DL (ref 70–99)
HCT VFR BLD AUTO: 40.8 % (ref 35–47)
HGB BLD-MCNC: 13.6 G/DL (ref 11.7–15.7)
HOLD SPECIMEN: NORMAL
IMM GRANULOCYTES # BLD: 0 10E3/UL
IMM GRANULOCYTES NFR BLD: 0 %
LIPASE SERPL-CCNC: 928 U/L (ref 73–393)
LYMPHOCYTES # BLD AUTO: 2.5 10E3/UL (ref 0.8–5.3)
LYMPHOCYTES NFR BLD AUTO: 36 %
MCH RBC QN AUTO: 32.3 PG (ref 26.5–33)
MCHC RBC AUTO-ENTMCNC: 33.3 G/DL (ref 31.5–36.5)
MCV RBC AUTO: 97 FL (ref 78–100)
MONOCYTES # BLD AUTO: 0.5 10E3/UL (ref 0–1.3)
MONOCYTES NFR BLD AUTO: 8 %
NEUTROPHILS # BLD AUTO: 3.4 10E3/UL (ref 1.6–8.3)
NEUTROPHILS NFR BLD AUTO: 49 %
NRBC # BLD AUTO: 0 10E3/UL
NRBC BLD AUTO-RTO: 0 /100
PLATELET # BLD AUTO: 331 10E3/UL (ref 150–450)
POTASSIUM BLD-SCNC: 3.7 MMOL/L (ref 3.4–5.3)
PROT SERPL-MCNC: 8 G/DL (ref 6.8–8.8)
RBC # BLD AUTO: 4.21 10E6/UL (ref 3.8–5.2)
SODIUM SERPL-SCNC: 142 MMOL/L (ref 133–144)
TROPONIN I SERPL HS-MCNC: <3 NG/L
WBC # BLD AUTO: 6.9 10E3/UL (ref 4–11)

## 2022-02-23 PROCEDURE — 71046 X-RAY EXAM CHEST 2 VIEWS: CPT

## 2022-02-23 PROCEDURE — 99285 EMERGENCY DEPT VISIT HI MDM: CPT | Mod: 25 | Performed by: EMERGENCY MEDICINE

## 2022-02-23 PROCEDURE — 82040 ASSAY OF SERUM ALBUMIN: CPT | Performed by: EMERGENCY MEDICINE

## 2022-02-23 PROCEDURE — 85004 AUTOMATED DIFF WBC COUNT: CPT | Performed by: EMERGENCY MEDICINE

## 2022-02-23 PROCEDURE — 36415 COLL VENOUS BLD VENIPUNCTURE: CPT | Performed by: EMERGENCY MEDICINE

## 2022-02-23 PROCEDURE — 84484 ASSAY OF TROPONIN QUANT: CPT | Performed by: EMERGENCY MEDICINE

## 2022-02-23 PROCEDURE — 80053 COMPREHEN METABOLIC PANEL: CPT | Performed by: EMERGENCY MEDICINE

## 2022-02-23 PROCEDURE — 83690 ASSAY OF LIPASE: CPT | Performed by: EMERGENCY MEDICINE

## 2022-02-23 PROCEDURE — 93010 ELECTROCARDIOGRAM REPORT: CPT | Performed by: EMERGENCY MEDICINE

## 2022-02-23 PROCEDURE — 93005 ELECTROCARDIOGRAM TRACING: CPT | Performed by: EMERGENCY MEDICINE

## 2022-02-24 NOTE — ED TRIAGE NOTES
Pt here with left sided chest pain that radiates to her neck and shoulder since last Monday 2/14 rated 4/10. Hx 2 prior heart attacks.

## 2022-02-24 NOTE — DISCHARGE INSTRUCTIONS
Follow-up with your primary care doctor in the next 2-5 days and have your lipase level rechecked for possible pancreatitis, further evaluation should be performed if this remains elevated.    Use Ibuprofen 400 mg to 600 mg daily for chest pain possibly due to costochondritis.    Return as needed for new or worsening symptoms, or new problems or concerns.

## 2022-02-24 NOTE — ED NOTES
"Pt c/o left sided chest pain for past week that radiates into neck.  On/off soa and cp.  No nausea or vomiting.  No recent illness or fevers. Walking up stairs makes the pain a little worse.  Hx of 2 MI's in the past due to \"drug use.\"  Pt states \"I have been clear a little over a year.  "

## 2022-03-09 NOTE — ED PROVIDER NOTES
History     Chief Complaint   Patient presents with     Chest Pain     HPI  Serenity Jolly is a 47 year old female who reports a prior history of heart attack in [place] many years ago who presents emergency department with daily left-sided chest pain present since 2/14/2022, 9 days ago.  Poorly described sharp aching chest pain primarily in the left lower anterior chest wall below the breast is reproducible to palpation.  Pain  does not radiate and is without aggravating relieving factors.  No pain relief with Tylenol she denies shortness of breath, cough, hemoptysis or leg pain or leg swelling.  No abdominal pain or back or flank pain.    She does not recall any prior history of interventions for prior heart attack in [place] many years ago and when asked about intervention such as angioplasty, stenting, angiogram, stress testing, etc. she does not recall having any of these performed.  Care Everywhere EMR reports a history of ischemic chest pain, ACS, elevated troponin and abnormal EKG with chronic CHF from April 2016.  EF was decreased to 35% but returned to normal for 11/16.  She has a history of polysubstance abuse including alcohol heroin and cocaine abuse, but states she has been sober and abstinent from drug use for a very long time, extended period of time, and has had no recent drug or alcohol use.  No other pertinent history or acute complaints or concerns.    Previous Records Reviewed:  Telephone Encounter - Marquita Black RN - 02/23/2022 1:35 PM   Formatting of this note might be different from the original.  Called patient to follow up on chest pain. Patient reports she developed mild left sided chest pain on 2/14/2022. She is occasionally feeling that pain radiate into her left arm and shoulder. She does admit to occasional lightheadedness but denies nausea, vomiting, shortness of breath, diaphoresis, cough and fever. Advised patient with her medical history she needs to be seen in the ER for  "further evaluation. Patient stated, \"I know. I am just trying to ignore it.\" Reiterated the importance of being seen in the ER for further evaluation. She is agreeable with plan and will go today.    Allergies  Reconcile with Patient's Chart  Active Allergy Reactions Severity Noted Date Comments   Sulfa (Sulfonamide Antibiotics) Hives   07/19/2010       Medications  Reconcile with Patient's Chart  Medication Sig Dispensed Refills Start Date End Date Status   acetaminophen (TYLENOL) 325 mg tablet    Indications: Pain Take 2 tablets by mouth every 6 hours if needed (pain). Max acetaminophen dose: 4000mg in 24 hrs. 60 tablet   2 04/14/2020   Active   ibuprofen (ADVIL; MOTRIN) 800 mg tablet    Indications: Pain take one tablet every 6 hours as needed for pain 60 tablet   0 06/02/2020   Active   nitroglycerin (NITROSTAT) 0.4 mg sublingual tablet    Indications: Acute coronary syndrome (HC) DISSOLVE ONE TABLET UNDER THE TONGUE EVERY 5 MINUTES AS NEEDED FOR CHEST PAIN. DO NOT EXCEED A TOTAL OF 3 DOSES IN 15 MINUTES 25 tablet   0 01/28/2021   Active   PARoxetine (PAXIL) 10 mg tablet    Indications: ZACHERY (generalized anxiety disorder) Take 1 Tablet (10 mg) by mouth every morning. 90 Tablet   0 01/05/2022   Active   nicotine (Nicotrol) 10 mg inhaler    Indications: Tobacco abuse disorder Inhale 10 mg by mouth every hour while awake as needed for Nicotine Craving. 168 Each   0 01/05/2022   Active   triamcinolone (ARISTOCORT; KENALOG) 0.1 % cream    Indications: Lesion of skin of scalp Apply topically to affected area(s) 2 times daily. 30 g   0 01/05/2022   Active   clindamycin 1% (CLEOCIN-T) 1 % lotion    Indications: Nasal lesion Apply topically to affected area(s) 2 times daily. 60 mL   1 01/13/2022   Active   clobetasol cream 0.05% (TEMOVATE) 0.05 % cream    Indications: Alopecia areata Apply topically to affected area(s) 2 times daily. 45 g   1 01/18/2022   Active       Active Problems  Reconcile with Patient's " Chart  Problem Noted Date   Major depressive disorder, recurrent, severe without psychotic features 01/05/2022   Psychosis 12/21/2019   Alcohol abuse 11/07/2019   Aspirin intolerance 03/19/2019   Heroin abuse 09/13/2017   Ischemic chest pain 04/11/2016   History of cocaine abuse 04/11/2016   Acute coronary syndrome 04/11/2016   Elevated troponin 04/11/2016   Abnormal EKG 04/11/2016   Chronic systolic CHF (congestive heart failure) 04/11/2016   Overview:     Formatting of this note might be different from the original.  Ef 35 % One week ago But normal 4/11/2016     Ovarian cyst, bilateral 07/27/2015   Generalized anxiety disorder 07/27/2015   Overview:     Formatting of this note might be different from the original.  Overview:   Diagnosis updated by automated process. Provider to review and confirm.     Urinary tract infection, site not specified 07/24/2010   Other acne 07/24/2010   Opioid type dependence, unspecified 08/29/2005   Injury of peroneal tendon of left foot     Strain of left ankle      Encounters  - from Last 3 Months    Date Type Specialty Care Team Description   02/18/2022 Nurse Triage Family Practice Allyson Muro [facility]   Chest Pain   02/16/2022 Office Visit Dermatology Saurav Salomon MD   Derm Problem   02/16/2022 Travel         02/12/2022 Travel         02/11/2022 Telephone Family Practice [facility]   Referral (COLONOSCOPY)   02/10/2022 Travel         01/18/2022 Telephone Dermatology Saurav Salomon MD   Results   01/13/2022 Office Visit Dermatology Saurav Salomon MD   Derm Problem   01/13/2022 Travel         01/05/2022 Office Visit Family Practice [facility]   Hair/Scalp Problem (2 bald spots on scalp.); Nicotine Dependence (Discuss options to quit vaping.); Weight (Weight gain. 13lbs in the last 6 months); Medication List Update (DC Atrovent per patient.); Immunization/Injection   01/05/2022 Travel            Immunizations  Reconcile with Patient's Chart    Name Administration Dates Next Due   Hepatitis B (Adult) 01/05/2022, 03/02/2021     MMR 08/30/2018     Td (Age >=7 Years) 11/23/1999     Td, Preservative Free (age >= 7 Years) 11/23/1999     Tdap 09/24/2019, 04/27/2016       Surgical History      Surgery Date Site/Laterality Comments   BREAST AUGMENTATION 1/1/2006 - 12/31/2006        REPAIR LATERAL ANKLE LIGAMENT (ATFL) AND REPAIR OF PERONEAL TENDON LATERAL LEFT ANKLE 11/30/2017 Left Dr. Daniel Davis       Medical History      Medical History Date Comments   Anxiety       Left ankle pain       Opioid dependence (HC)       Urinary tract infection       Ischemic chest pain (HC)       History of cocaine abuse (HC)       Acute coronary syndrome (HC)       Elevated troponin       Abnormal EKG       Chronic systolic CHF (congestive heart failure) (HC)       Heroin abuse (HC)       Myocardial infarction (HC)         Family History      Medical History Relation Name Comments   Other Father    no heart disease   Other Mother    no heart disease    Cancer-breast No Family History        Cancer-ovarian No Family History          Relation Name Status Comments   Father    Alive     Mother    Alive       Social History      Tobacco Use Types Packs/Day Years Used Date   Former Smoker Cigarettes 0.25 3 Quit: 04/16/2018   Smokeless Tobacco: Never Used           Tobacco Cessation: Counseling Given: Yes  Comments: Uses a vape currently     Alcohol Use Standard Drinks/Week Comments   Not Currently 2 (1 standard drink = 0.6 oz pure alcohol) has been in recent rehab December 31,2015 through January 30,2016         Allergies:  Allergies   Allergen Reactions     Sulfa Drugs Hives       Problem List:    Patient Active Problem List    Diagnosis Date Noted     Psychosis (H) 12/21/2019     Priority: Medium     Chemical dependency (H) 11/13/2019     Priority: Medium     Alcohol abuse 11/07/2019     Priority: Medium     Heroin abuse (H)  "09/13/2017     Priority: Medium     Ovarian cyst, bilateral 07/27/2015     Priority: Medium     Generalized anxiety disorder 07/27/2015     Priority: Medium     Diagnosis updated by automated process. Provider to review and confirm.          Past Medical History:    Past Medical History:   Diagnosis Date     Anxiety      Cerebral infarction (H)      Depressive disorder      Myocardial infarction (H)      Substance abuse (H)        Past Surgical History:    Past Surgical History:   Procedure Laterality Date     BREAST SURGERY      breast augmentation     COSMETIC SURGERY       ENT SURGERY       GYN SURGERY      drained R ovary       Family History:    History reviewed. No pertinent family history.    Social History:  Marital Status:  Single [1]  Social History     Tobacco Use     Smoking status: Current Every Day Smoker     Packs/day: 0.50     Last attempt to quit: 1/1/2019     Years since quitting: 3.1     Smokeless tobacco: Never Used     Tobacco comment: Declines NRT at admission   Substance Use Topics     Alcohol use: No     Comment: socially     Drug use: Yes     Types: IV, Methamphetamines        Medications:    acetaminophen (TYLENOL) 325 MG tablet  alum & mag hydroxide-simethicone (MYLANTA/MAALOX) 200-200-20 MG/5ML SUSP suspension  ibuprofen (ADVIL/MOTRIN) 200 MG tablet  loratadine (CLARITIN) 10 MG tablet  melatonin 3 MG tablet  multivitamin w/minerals (THERA-VIT-M) tablet  OLANZapine (ZYPREXA) 10 MG tablet  senna-docusate (SENOKOT-S/PERICOLACE) 8.6-50 MG tablet  sertraline (ZOLOFT) 100 MG tablet      Review of Systems  As mentioned above in the history present illness.  All other systems were reviewed and are negative.    Physical Exam   BP: (!) 153/95  Pulse: 93  Temp: 97.5  F (36.4  C)  Resp: 18  Height: 165.1 cm (5' 5\")  Weight: 71.7 kg (158 lb)  SpO2: 98 %      Physical Exam  Vitals and nursing note reviewed.   Constitutional:       General: She is not in acute distress.     Appearance: Normal " appearance. She is well-developed. She is not ill-appearing or diaphoretic.   HENT:      Head: Normocephalic and atraumatic.      Right Ear: External ear normal.      Left Ear: External ear normal.   Eyes:      General: No scleral icterus.     Extraocular Movements: Extraocular movements intact.      Conjunctiva/sclera: Conjunctivae normal.   Neck:      Trachea: No tracheal deviation.   Cardiovascular:      Rate and Rhythm: Normal rate and regular rhythm.      Pulses: Normal pulses.      Heart sounds: Normal heart sounds. No murmur heard.    No friction rub. No gallop.   Pulmonary:      Effort: Pulmonary effort is normal. No respiratory distress.      Breath sounds: Normal breath sounds. No decreased breath sounds, wheezing, rhonchi or rales.   Chest:      Chest wall: Tenderness (Mild chest wall tenderness, reproducible to palpation, as diagrammed.) present. No deformity, swelling, crepitus or edema.       Abdominal:      General: There is no distension.      Palpations: Abdomen is soft.      Tenderness: There is no abdominal tenderness.   Musculoskeletal:         General: No swelling or tenderness. Normal range of motion.      Cervical back: Normal range of motion and neck supple.      Right lower leg: No edema.      Left lower leg: No edema.   Skin:     General: Skin is warm and dry.      Coloration: Skin is not pale.      Findings: No erythema or rash.   Neurological:      General: No focal deficit present.      Mental Status: She is alert and oriented to person, place, and time.   Psychiatric:         Mood and Affect: Mood normal.         Behavior: Behavior normal.         ED Course             Procedures              EKG Interpretation:      Interpreted by Chuck Garcia MD  Time reviewed: Upon completion  Symptoms at time of EKG: Chest pain  Rhythm: normal sinus   Rate: normal  Axis: normal  Ectopy: none  Conduction: normal  ST Segments/ T Waves: No ST-T wave changes  Q Waves: none  Comparison to prior:  1/27/21  Clinical Impression: Normal EKG         Results for orders placed or performed during the hospital encounter of 02/23/22 (from the past 24 hour(s))   CBC with platelets differential    Narrative    The following orders were created for panel order CBC with platelets differential.  Procedure                               Abnormality         Status                     ---------                               -----------         ------                     CBC with platelets and d...[[id number]]                      Final result                 Please view results for these tests on the individual orders.   Comprehensive metabolic panel   Result Value Ref Range    Sodium 142 133 - 144 mmol/L    Potassium 3.7 3.4 - 5.3 mmol/L    Chloride 105 94 - 109 mmol/L    Carbon Dioxide (CO2) 31 20 - 32 mmol/L    Anion Gap 6 3 - 14 mmol/L    Urea Nitrogen 13 7 - 30 mg/dL    Creatinine 0.69 0.52 - 1.04 mg/dL    Calcium 9.4 8.5 - 10.1 mg/dL    Glucose 91 70 - 99 mg/dL    Alkaline Phosphatase 80 40 - 150 U/L    AST 10 0 - 45 U/L    ALT 20 0 - 50 U/L    Protein Total 8.0 6.8 - 8.8 g/dL    Albumin 4.5 3.4 - 5.0 g/dL    Bilirubin Total 0.3 0.2 - 1.3 mg/dL    GFR Estimate >90 >60 mL/min/1.73m2   Lipase   Result Value Ref Range    Lipase 928 (H) 73 - 393 U/L   CBC with platelets and differential   Result Value Ref Range    WBC Count 6.9 4.0 - 11.0 10e3/uL    RBC Count 4.21 3.80 - 5.20 10e6/uL    Hemoglobin 13.6 11.7 - 15.7 g/dL    Hematocrit 40.8 35.0 - 47.0 %    MCV 97 78 - 100 fL    MCH 32.3 26.5 - 33.0 pg    MCHC 33.3 31.5 - 36.5 g/dL    RDW 12.2 10.0 - 15.0 %    Platelet Count 331 150 - 450 10e3/uL    % Neutrophils 49 %    % Lymphocytes 36 %    % Monocytes 8 %    % Eosinophils 6 %    % Basophils 1 %    % Immature Granulocytes 0 %    NRBCs per 100 WBC 0 <1 /100    Absolute Neutrophils 3.4 1.6 - 8.3 10e3/uL    Absolute Lymphocytes 2.5 0.8 - 5.3 10e3/uL    Absolute Monocytes 0.5 0.0 - 1.3 10e3/uL    Absolute Eosinophils 0.4 0.0 -  0.7 10e3/uL    Absolute Basophils 0.1 0.0 - 0.2 10e3/uL    Absolute Immature Granulocytes 0.0 <=0.4 10e3/uL    Absolute NRBCs 0.0 10e3/uL   Troponin I   Result Value Ref Range    Troponin I High Sensitivity <3 <54 ng/L   XR Chest 2 Views    Narrative    EXAM: XR CHEST 2 VW  LOCATION: [facility]  DATE/TIME: 2/23/2022 9:26 PM    INDICATION: Left sided chest pain  COMPARISON: None.      Impression    IMPRESSION: Normal 2 views of the chest.     I independently reviewed the X-rays: Agree with the Radiologist's interpretation.      Medications - No data to display    We discussed her laboratory evaluation elevated lipase level and she was reexamined and has no epigastric or upper abdominal pain.  She denies alcohol use.  She does not want to get a right upper quadrant ultrasound study today.    Assessments & Plan (with Medical Decision Making)   47-year-old female with history of alcohol, cocaine and heroin abuse, ischemic chest pain, ACS, elevated troponin and abnormal EKG with CHF in April 2016 when EF was decreased to 35% but returned to normal 4/11/16, who presents 9 days of daily intermittent left-sided chest pain. She has been sober and abstinent from drug use for a very long time, extended period of time, and has had no recent drug or alcohol use.  She has reproducible chest wall pain suggestive of costochondritis with normal EKG and troponin, and normal chest x-ray.  Laboratory evaluation remarkable for a relatively mildly elevated lipase of 928, less than 3X normal, but she has no abdominal pain on serial exams and she declined right upper quadrant ultrasound evaluation.  Do not feel that a CT study is emergently evaluated, and she does not want to have further imaging evaluation today.  Her examination not consistent with acute pancreatitis and repeat abdominal exam showed no abdominal pain.  This could be chronic or medication induced.  I reviewed this with her and clearly  recommended that she have a repeat lipase and clinic follow-up in the next 2-5 days, with plan to return immediately should she develop any epigastric or abdominal pain, or nausea or vomiting.  She expressed understanding of this and agreed to do so.  She was provided instructions for supportive care and will return as needed for worsened condition or worsening symptoms, or new problems or concerns.    I have reviewed the nursing notes.    I have reviewed the findings, diagnosis, plan and need for follow up with the patient.    Discharge Medication List as of 2/23/2022 10:15 PM          Final diagnoses:   Acute chest pain       2/23/2022   [facility] EMERGENCY DEPT     Chuck Garcia MD  02/23/22 4810     no 5

## 2022-05-13 NOTE — ED NOTES
"One attempt to draw labs and was unsuccessful and pt is refusing to let me try again, I talked with her in length about how important this was to make sure she is not having trouble with her heart, she states \"I know but you are not doing it again\" provider aware    " Form received in Medical Release for processing.  Please note that it takes 7-10 business days for completion.  Emailed authorization to patient for signature. Emailed to: [email or web address]

## 2022-08-07 ENCOUNTER — HEALTH MAINTENANCE LETTER (OUTPATIENT)
Age: 47
End: 2022-08-07

## 2022-10-23 ENCOUNTER — HEALTH MAINTENANCE LETTER (OUTPATIENT)
Age: 47
End: 2022-10-23

## 2023-01-11 ENCOUNTER — MEDICAL CORRESPONDENCE (OUTPATIENT)
Dept: HEALTH INFORMATION MANAGEMENT | Facility: CLINIC | Age: 48
End: 2023-01-11

## 2023-01-30 ENCOUNTER — HOSPITAL ENCOUNTER (INPATIENT)
Facility: HOSPITAL | Age: 48
Setting detail: SURGERY ADMIT
End: 2023-01-30
Attending: ORTHOPAEDIC SURGERY | Admitting: ORTHOPAEDIC SURGERY
Payer: COMMERCIAL

## 2023-02-20 ENCOUNTER — ANESTHESIA EVENT (OUTPATIENT)
Dept: SURGERY | Facility: CLINIC | Age: 48
End: 2023-02-20
Payer: COMMERCIAL

## 2023-02-20 RX ORDER — CEFAZOLIN SODIUM/WATER 2 G/20 ML
2 SYRINGE (ML) INTRAVENOUS
Status: CANCELLED | OUTPATIENT
Start: 2023-02-27

## 2023-02-20 RX ORDER — CEFAZOLIN SODIUM/WATER 2 G/20 ML
2 SYRINGE (ML) INTRAVENOUS SEE ADMIN INSTRUCTIONS
Status: CANCELLED | OUTPATIENT
Start: 2023-02-27

## 2023-02-20 RX ORDER — PAROXETINE 10 MG/1
10 TABLET, FILM COATED ORAL EVERY MORNING
COMMUNITY
End: 2023-02-20 | Stop reason: HOSPADM

## 2023-02-20 RX ORDER — CLOBETASOL PROPIONATE 0.5 MG/G
CREAM TOPICAL 2 TIMES DAILY
COMMUNITY
End: 2023-02-20 | Stop reason: HOSPADM

## 2023-02-20 RX ORDER — NITROGLYCERIN 0.3 MG/1
0.3 TABLET SUBLINGUAL EVERY 5 MIN PRN
COMMUNITY
End: 2023-02-20 | Stop reason: HOSPADM

## 2023-02-20 RX ORDER — GABAPENTIN 100 MG/1
100 CAPSULE ORAL
COMMUNITY
End: 2023-02-20 | Stop reason: HOSPADM

## 2023-02-20 RX ORDER — ONDANSETRON 4 MG/1
4 TABLET, ORALLY DISINTEGRATING ORAL EVERY 8 HOURS PRN
COMMUNITY
End: 2023-02-20 | Stop reason: HOSPADM

## 2023-02-20 RX ORDER — GABAPENTIN 300 MG/1
300 CAPSULE ORAL
Status: CANCELLED | OUTPATIENT
Start: 2023-02-20

## 2023-02-20 RX ORDER — HYDROXYZINE HYDROCHLORIDE 25 MG/1
25 TABLET, FILM COATED ORAL EVERY 6 HOURS PRN
COMMUNITY
End: 2023-02-20 | Stop reason: HOSPADM

## 2023-02-21 ENCOUNTER — ANESTHESIA (OUTPATIENT)
Dept: SURGERY | Facility: CLINIC | Age: 48
End: 2023-02-21
Payer: COMMERCIAL

## 2023-02-23 RX ORDER — PAROXETINE 10 MG/1
10 TABLET, FILM COATED ORAL EVERY MORNING
COMMUNITY

## 2023-02-27 ENCOUNTER — APPOINTMENT (OUTPATIENT)
Dept: RADIOLOGY | Facility: CLINIC | Age: 48
End: 2023-02-27
Attending: ORTHOPAEDIC SURGERY
Payer: COMMERCIAL

## 2023-02-27 ENCOUNTER — HOSPITAL ENCOUNTER (INPATIENT)
Facility: CLINIC | Age: 48
LOS: 4 days | Discharge: HOME | End: 2023-03-03
Attending: ORTHOPAEDIC SURGERY | Admitting: ORTHOPAEDIC SURGERY
Payer: COMMERCIAL

## 2023-02-27 DIAGNOSIS — M54.50 LUMBAR BACK PAIN: Primary | ICD-10-CM

## 2023-02-27 DIAGNOSIS — G89.18 ACUTE POST-OPERATIVE PAIN: ICD-10-CM

## 2023-02-27 LAB
GLUCOSE BLDC GLUCOMTR-MCNC: 105 MG/DL (ref 70–99)
HCG UR QL: NEGATIVE

## 2023-02-27 PROCEDURE — 710N000010 HC RECOVERY PHASE 1, LEVEL 2, PER MIN: Performed by: ORTHOPAEDIC SURGERY

## 2023-02-27 PROCEDURE — 250N000009 HC RX 250: Performed by: PHYSICIAN ASSISTANT

## 2023-02-27 PROCEDURE — 81025 URINE PREGNANCY TEST: CPT | Performed by: ORTHOPAEDIC SURGERY

## 2023-02-27 PROCEDURE — 01NB0ZZ RELEASE LUMBAR NERVE, OPEN APPROACH: ICD-10-PCS | Performed by: ORTHOPAEDIC SURGERY

## 2023-02-27 PROCEDURE — 250N000011 HC RX IP 250 OP 636: Performed by: ANESTHESIOLOGY

## 2023-02-27 PROCEDURE — 250N000005 HC OR RX SURGIFLO HEMOSTATIC MATRIX 10ML 199102S OPNP: Performed by: ORTHOPAEDIC SURGERY

## 2023-02-27 PROCEDURE — 250N000011 HC RX IP 250 OP 636: Performed by: NURSE ANESTHETIST, CERTIFIED REGISTERED

## 2023-02-27 PROCEDURE — C1713 ANCHOR/SCREW BN/BN,TIS/BN: HCPCS | Performed by: ORTHOPAEDIC SURGERY

## 2023-02-27 PROCEDURE — 87081 CULTURE SCREEN ONLY: CPT | Performed by: ORTHOPAEDIC SURGERY

## 2023-02-27 PROCEDURE — 999N000141 HC STATISTIC PRE-PROCEDURE NURSING ASSESSMENT: Performed by: ORTHOPAEDIC SURGERY

## 2023-02-27 PROCEDURE — 272N000001 HC OR GENERAL SUPPLY STERILE: Performed by: ORTHOPAEDIC SURGERY

## 2023-02-27 PROCEDURE — 99222 1ST HOSP IP/OBS MODERATE 55: CPT | Performed by: FAMILY MEDICINE

## 2023-02-27 PROCEDURE — P9045 ALBUMIN (HUMAN), 5%, 250 ML: HCPCS | Performed by: NURSE ANESTHETIST, CERTIFIED REGISTERED

## 2023-02-27 PROCEDURE — 258N000003 HC RX IP 258 OP 636: Performed by: PHYSICIAN ASSISTANT

## 2023-02-27 PROCEDURE — 250N000011 HC RX IP 250 OP 636: Performed by: ORTHOPAEDIC SURGERY

## 2023-02-27 PROCEDURE — C1762 CONN TISS, HUMAN(INC FASCIA): HCPCS | Performed by: ORTHOPAEDIC SURGERY

## 2023-02-27 PROCEDURE — 0SG00AJ FUSION OF LUMBAR VERTEBRAL JOINT WITH INTERBODY FUSION DEVICE, POSTERIOR APPROACH, ANTERIOR COLUMN, OPEN APPROACH: ICD-10-PCS | Performed by: ORTHOPAEDIC SURGERY

## 2023-02-27 PROCEDURE — 999N000182 XR SURGERY CARM FLUORO GREATER THAN 5 MIN: Mod: TC

## 2023-02-27 PROCEDURE — 370N000017 HC ANESTHESIA TECHNICAL FEE, PER MIN: Performed by: ORTHOPAEDIC SURGERY

## 2023-02-27 PROCEDURE — 250N000009 HC RX 250: Performed by: ORTHOPAEDIC SURGERY

## 2023-02-27 PROCEDURE — 360N000085 HC SURGERY LEVEL 5 W/ FLUORO, PER MIN: Performed by: ORTHOPAEDIC SURGERY

## 2023-02-27 PROCEDURE — 278N000051 HC OR IMPLANT GENERAL: Performed by: ORTHOPAEDIC SURGERY

## 2023-02-27 PROCEDURE — 120N000001 HC R&B MED SURG/OB

## 2023-02-27 PROCEDURE — 8E0WXBG COMPUTER ASSISTED PROCEDURE OF TRUNK REGION, WITH COMPUTERIZED TOMOGRAPHY: ICD-10-PCS | Performed by: ORTHOPAEDIC SURGERY

## 2023-02-27 PROCEDURE — 250N000013 HC RX MED GY IP 250 OP 250 PS 637: Performed by: PHYSICIAN ASSISTANT

## 2023-02-27 PROCEDURE — 250N000011 HC RX IP 250 OP 636: Performed by: PHYSICIAN ASSISTANT

## 2023-02-27 PROCEDURE — 250N000009 HC RX 250: Performed by: NURSE ANESTHETIST, CERTIFIED REGISTERED

## 2023-02-27 PROCEDURE — 0SG0071 FUSION OF LUMBAR VERTEBRAL JOINT WITH AUTOLOGOUS TISSUE SUBSTITUTE, POSTERIOR APPROACH, POSTERIOR COLUMN, OPEN APPROACH: ICD-10-PCS | Performed by: ORTHOPAEDIC SURGERY

## 2023-02-27 PROCEDURE — 0SB20ZZ EXCISION OF LUMBAR VERTEBRAL DISC, OPEN APPROACH: ICD-10-PCS | Performed by: ORTHOPAEDIC SURGERY

## 2023-02-27 PROCEDURE — 258N000003 HC RX IP 258 OP 636: Performed by: ORTHOPAEDIC SURGERY

## 2023-02-27 PROCEDURE — 258N000003 HC RX IP 258 OP 636: Performed by: ANESTHESIOLOGY

## 2023-02-27 PROCEDURE — 258N000003 HC RX IP 258 OP 636: Performed by: NURSE ANESTHETIST, CERTIFIED REGISTERED

## 2023-02-27 PROCEDURE — 250N000025 HC SEVOFLURANE, PER MIN: Performed by: ORTHOPAEDIC SURGERY

## 2023-02-27 DEVICE — IMP ROD MEDT 3.5CM 1475501030: Type: IMPLANTABLE DEVICE | Site: SPINE LUMBAR | Status: FUNCTIONAL

## 2023-02-27 DEVICE — IMP SCR MEDT BREAK-OFF SET SOLERA 4.75MM TI 5440030: Type: IMPLANTABLE DEVICE | Site: SPINE LUMBAR | Status: FUNCTIONAL

## 2023-02-27 DEVICE — IMP SPI INTERBODY MEDT CATALYFT PL SHORT 7MM 6068073: Type: IMPLANTABLE DEVICE | Site: SPINE LUMBAR | Status: FUNCTIONAL

## 2023-02-27 DEVICE — IMP SCR MEDT 4.75MM SOLERA 6.5X55MM MA 54840006555: Type: IMPLANTABLE DEVICE | Site: SPINE LUMBAR | Status: FUNCTIONAL

## 2023-02-27 DEVICE — DBM 7509045 MAG SINGLE 1.75 X 5CM
Type: IMPLANTABLE DEVICE | Site: SPINE LUMBAR | Status: FUNCTIONAL
Brand: MAGNIFUSE® BONE GRAFT

## 2023-02-27 DEVICE — IMP SCR MEDT 4.75MM SOLERA 6.5X40MM MA 54840006540: Type: IMPLANTABLE DEVICE | Site: SPINE LUMBAR | Status: FUNCTIONAL

## 2023-02-27 RX ORDER — BUPIVACAINE HYDROCHLORIDE AND EPINEPHRINE 2.5; 5 MG/ML; UG/ML
INJECTION, SOLUTION EPIDURAL; INFILTRATION; INTRACAUDAL; PERINEURAL
Status: DISCONTINUED
Start: 2023-02-27 | End: 2023-02-27 | Stop reason: HOSPADM

## 2023-02-27 RX ORDER — BUPIVACAINE HYDROCHLORIDE AND EPINEPHRINE 2.5; 5 MG/ML; UG/ML
INJECTION, SOLUTION EPIDURAL; INFILTRATION; INTRACAUDAL; PERINEURAL PRN
Status: DISCONTINUED | OUTPATIENT
Start: 2023-02-27 | End: 2023-02-27 | Stop reason: HOSPADM

## 2023-02-27 RX ORDER — OXYCODONE HYDROCHLORIDE 5 MG/1
5 TABLET ORAL EVERY 4 HOURS PRN
Status: DISCONTINUED | OUTPATIENT
Start: 2023-02-27 | End: 2023-02-28

## 2023-02-27 RX ORDER — HYDROMORPHONE HCL IN WATER/PF 6 MG/30 ML
0.4 PATIENT CONTROLLED ANALGESIA SYRINGE INTRAVENOUS EVERY 5 MIN PRN
Status: DISCONTINUED | OUTPATIENT
Start: 2023-02-27 | End: 2023-02-27 | Stop reason: HOSPADM

## 2023-02-27 RX ORDER — CEFAZOLIN SODIUM 1 G/3ML
1 INJECTION, POWDER, FOR SOLUTION INTRAMUSCULAR; INTRAVENOUS EVERY 8 HOURS
Status: COMPLETED | OUTPATIENT
Start: 2023-02-27 | End: 2023-02-28

## 2023-02-27 RX ORDER — ONDANSETRON 4 MG/1
4 TABLET, ORALLY DISINTEGRATING ORAL EVERY 8 HOURS PRN
COMMUNITY

## 2023-02-27 RX ORDER — FENTANYL CITRATE 50 UG/ML
25 INJECTION, SOLUTION INTRAMUSCULAR; INTRAVENOUS EVERY 5 MIN PRN
Status: DISCONTINUED | OUTPATIENT
Start: 2023-02-27 | End: 2023-02-27 | Stop reason: HOSPADM

## 2023-02-27 RX ORDER — ONDANSETRON 4 MG/1
4 TABLET, ORALLY DISINTEGRATING ORAL EVERY 8 HOURS PRN
Status: DISCONTINUED | OUTPATIENT
Start: 2023-02-27 | End: 2023-02-27

## 2023-02-27 RX ORDER — HYDROMORPHONE HCL IN WATER/PF 6 MG/30 ML
0.2 PATIENT CONTROLLED ANALGESIA SYRINGE INTRAVENOUS
Status: DISCONTINUED | OUTPATIENT
Start: 2023-02-27 | End: 2023-02-28

## 2023-02-27 RX ORDER — ONDANSETRON 2 MG/ML
INJECTION INTRAMUSCULAR; INTRAVENOUS PRN
Status: DISCONTINUED | OUTPATIENT
Start: 2023-02-27 | End: 2023-02-27

## 2023-02-27 RX ORDER — OXYCODONE HYDROCHLORIDE 5 MG/1
10 TABLET ORAL EVERY 4 HOURS PRN
Status: DISCONTINUED | OUTPATIENT
Start: 2023-02-27 | End: 2023-02-28

## 2023-02-27 RX ORDER — SODIUM CHLORIDE, SODIUM LACTATE, POTASSIUM CHLORIDE, CALCIUM CHLORIDE 600; 310; 30; 20 MG/100ML; MG/100ML; MG/100ML; MG/100ML
INJECTION, SOLUTION INTRAVENOUS CONTINUOUS
Status: DISCONTINUED | OUTPATIENT
Start: 2023-02-27 | End: 2023-02-27 | Stop reason: HOSPADM

## 2023-02-27 RX ORDER — PAROXETINE 10 MG/1
10 TABLET, FILM COATED ORAL EVERY MORNING
Status: DISCONTINUED | OUTPATIENT
Start: 2023-02-28 | End: 2023-03-03 | Stop reason: HOSPADM

## 2023-02-27 RX ORDER — BUPIVACAINE HYDROCHLORIDE AND EPINEPHRINE 2.5; 5 MG/ML; UG/ML
INJECTION, SOLUTION EPIDURAL; INFILTRATION; INTRACAUDAL; PERINEURAL
Status: DISCONTINUED
Start: 2023-02-27 | End: 2023-02-27 | Stop reason: WASHOUT

## 2023-02-27 RX ORDER — ONDANSETRON 2 MG/ML
4 INJECTION INTRAMUSCULAR; INTRAVENOUS EVERY 30 MIN PRN
Status: DISCONTINUED | OUTPATIENT
Start: 2023-02-27 | End: 2023-02-27 | Stop reason: HOSPADM

## 2023-02-27 RX ORDER — ONDANSETRON 2 MG/ML
4 INJECTION INTRAMUSCULAR; INTRAVENOUS EVERY 6 HOURS PRN
Status: DISCONTINUED | OUTPATIENT
Start: 2023-02-27 | End: 2023-03-03 | Stop reason: HOSPADM

## 2023-02-27 RX ORDER — FENTANYL CITRATE 50 UG/ML
50 INJECTION, SOLUTION INTRAMUSCULAR; INTRAVENOUS EVERY 5 MIN PRN
Status: DISCONTINUED | OUTPATIENT
Start: 2023-02-27 | End: 2023-02-27 | Stop reason: HOSPADM

## 2023-02-27 RX ORDER — LIDOCAINE 40 MG/G
CREAM TOPICAL
Status: DISCONTINUED | OUTPATIENT
Start: 2023-02-27 | End: 2023-02-27 | Stop reason: HOSPADM

## 2023-02-27 RX ORDER — MULTIVITAMIN,THERAPEUTIC
1 TABLET ORAL DAILY
Status: DISCONTINUED | OUTPATIENT
Start: 2023-02-28 | End: 2023-03-03 | Stop reason: HOSPADM

## 2023-02-27 RX ORDER — MAGNESIUM HYDROXIDE/ALUMINUM HYDROXICE/SIMETHICONE 120; 1200; 1200 MG/30ML; MG/30ML; MG/30ML
30 SUSPENSION ORAL EVERY 6 HOURS PRN
Status: DISCONTINUED | OUTPATIENT
Start: 2023-02-27 | End: 2023-03-03 | Stop reason: HOSPADM

## 2023-02-27 RX ORDER — HYDROMORPHONE HCL IN WATER/PF 6 MG/30 ML
0.2 PATIENT CONTROLLED ANALGESIA SYRINGE INTRAVENOUS EVERY 5 MIN PRN
Status: DISCONTINUED | OUTPATIENT
Start: 2023-02-27 | End: 2023-02-27 | Stop reason: HOSPADM

## 2023-02-27 RX ORDER — LIDOCAINE HYDROCHLORIDE 10 MG/ML
INJECTION, SOLUTION INFILTRATION; PERINEURAL PRN
Status: DISCONTINUED | OUTPATIENT
Start: 2023-02-27 | End: 2023-02-27

## 2023-02-27 RX ORDER — PROCHLORPERAZINE MALEATE 10 MG
10 TABLET ORAL EVERY 6 HOURS PRN
Status: DISCONTINUED | OUTPATIENT
Start: 2023-02-27 | End: 2023-03-03 | Stop reason: HOSPADM

## 2023-02-27 RX ORDER — POLYETHYLENE GLYCOL 3350 17 G/17G
17 POWDER, FOR SOLUTION ORAL DAILY
Status: DISCONTINUED | OUTPATIENT
Start: 2023-02-28 | End: 2023-03-03 | Stop reason: HOSPADM

## 2023-02-27 RX ORDER — AMOXICILLIN 250 MG
1 CAPSULE ORAL 2 TIMES DAILY
Status: DISCONTINUED | OUTPATIENT
Start: 2023-02-27 | End: 2023-03-03 | Stop reason: HOSPADM

## 2023-02-27 RX ORDER — MELOXICAM 15 MG/1
15 TABLET ORAL DAILY
Status: ON HOLD | COMMUNITY
End: 2023-03-02

## 2023-02-27 RX ORDER — GLYCOPYRROLATE 0.2 MG/ML
INJECTION, SOLUTION INTRAMUSCULAR; INTRAVENOUS PRN
Status: DISCONTINUED | OUTPATIENT
Start: 2023-02-27 | End: 2023-02-27

## 2023-02-27 RX ORDER — LORATADINE 10 MG/1
10 TABLET ORAL DAILY PRN
Status: DISCONTINUED | OUTPATIENT
Start: 2023-02-27 | End: 2023-02-27

## 2023-02-27 RX ORDER — ONDANSETRON 4 MG/1
4 TABLET, ORALLY DISINTEGRATING ORAL EVERY 30 MIN PRN
Status: DISCONTINUED | OUTPATIENT
Start: 2023-02-27 | End: 2023-02-27 | Stop reason: HOSPADM

## 2023-02-27 RX ORDER — HYDROXYZINE HYDROCHLORIDE 25 MG/1
25 TABLET, FILM COATED ORAL EVERY 4 HOURS PRN
Status: DISCONTINUED | OUTPATIENT
Start: 2023-02-27 | End: 2023-02-28

## 2023-02-27 RX ORDER — CEFAZOLIN SODIUM/WATER 2 G/20 ML
2 SYRINGE (ML) INTRAVENOUS
Status: COMPLETED | OUTPATIENT
Start: 2023-02-27 | End: 2023-02-27

## 2023-02-27 RX ORDER — LORATADINE 10 MG/1
10 TABLET ORAL DAILY PRN
Status: DISCONTINUED | OUTPATIENT
Start: 2023-02-27 | End: 2023-03-03 | Stop reason: HOSPADM

## 2023-02-27 RX ORDER — HYDROXYZINE HYDROCHLORIDE 25 MG/1
25 TABLET, FILM COATED ORAL EVERY 6 HOURS PRN
Status: DISCONTINUED | OUTPATIENT
Start: 2023-02-27 | End: 2023-02-27

## 2023-02-27 RX ORDER — GABAPENTIN 100 MG/1
100 CAPSULE ORAL 3 TIMES DAILY PRN
Status: DISCONTINUED | OUTPATIENT
Start: 2023-02-27 | End: 2023-03-01

## 2023-02-27 RX ORDER — HYDROMORPHONE HCL IN WATER/PF 6 MG/30 ML
0.4 PATIENT CONTROLLED ANALGESIA SYRINGE INTRAVENOUS
Status: DISCONTINUED | OUTPATIENT
Start: 2023-02-27 | End: 2023-02-28

## 2023-02-27 RX ORDER — DEXAMETHASONE SODIUM PHOSPHATE 10 MG/ML
INJECTION, SOLUTION INTRAMUSCULAR; INTRAVENOUS PRN
Status: DISCONTINUED | OUTPATIENT
Start: 2023-02-27 | End: 2023-02-27

## 2023-02-27 RX ORDER — BISACODYL 10 MG
10 SUPPOSITORY, RECTAL RECTAL DAILY PRN
Status: DISCONTINUED | OUTPATIENT
Start: 2023-02-27 | End: 2023-03-03 | Stop reason: HOSPADM

## 2023-02-27 RX ORDER — GABAPENTIN 300 MG/1
300 CAPSULE ORAL
Status: COMPLETED | OUTPATIENT
Start: 2023-02-27 | End: 2023-02-27

## 2023-02-27 RX ORDER — METHOCARBAMOL 750 MG/1
750 TABLET, FILM COATED ORAL EVERY 6 HOURS PRN
Status: DISCONTINUED | OUTPATIENT
Start: 2023-02-27 | End: 2023-02-28

## 2023-02-27 RX ORDER — VANCOMYCIN HYDROCHLORIDE 1 G/20ML
INJECTION, POWDER, LYOPHILIZED, FOR SOLUTION INTRAVENOUS
Status: DISCONTINUED
Start: 2023-02-27 | End: 2023-02-27 | Stop reason: HOSPADM

## 2023-02-27 RX ORDER — FENTANYL CITRATE 50 UG/ML
INJECTION, SOLUTION INTRAMUSCULAR; INTRAVENOUS PRN
Status: DISCONTINUED | OUTPATIENT
Start: 2023-02-27 | End: 2023-02-27

## 2023-02-27 RX ORDER — CEFAZOLIN SODIUM/WATER 2 G/20 ML
2 SYRINGE (ML) INTRAVENOUS SEE ADMIN INSTRUCTIONS
Status: DISCONTINUED | OUTPATIENT
Start: 2023-02-27 | End: 2023-02-27 | Stop reason: HOSPADM

## 2023-02-27 RX ORDER — PROPOFOL 10 MG/ML
INJECTION, EMULSION INTRAVENOUS PRN
Status: DISCONTINUED | OUTPATIENT
Start: 2023-02-27 | End: 2023-02-27

## 2023-02-27 RX ORDER — GABAPENTIN 100 MG/1
100 CAPSULE ORAL 3 TIMES DAILY PRN
COMMUNITY

## 2023-02-27 RX ORDER — SODIUM CHLORIDE 9 MG/ML
INJECTION, SOLUTION INTRAVENOUS CONTINUOUS
Status: DISCONTINUED | OUTPATIENT
Start: 2023-02-27 | End: 2023-03-03 | Stop reason: HOSPADM

## 2023-02-27 RX ORDER — HYDROXYZINE HYDROCHLORIDE 25 MG/1
25 TABLET, FILM COATED ORAL EVERY 6 HOURS PRN
Status: ON HOLD | COMMUNITY
End: 2023-03-02

## 2023-02-27 RX ORDER — HYDROXYZINE HYDROCHLORIDE 50 MG/ML
25 INJECTION, SOLUTION INTRAMUSCULAR EVERY 4 HOURS PRN
Status: DISCONTINUED | OUTPATIENT
Start: 2023-02-27 | End: 2023-02-28

## 2023-02-27 RX ORDER — ONDANSETRON 4 MG/1
4 TABLET, ORALLY DISINTEGRATING ORAL EVERY 6 HOURS PRN
Status: DISCONTINUED | OUTPATIENT
Start: 2023-02-27 | End: 2023-03-03 | Stop reason: HOSPADM

## 2023-02-27 RX ORDER — HYDROXYZINE HYDROCHLORIDE 25 MG/1
25 TABLET, FILM COATED ORAL EVERY 6 HOURS PRN
Status: DISCONTINUED | OUTPATIENT
Start: 2023-02-27 | End: 2023-02-28

## 2023-02-27 RX ORDER — VANCOMYCIN HYDROCHLORIDE 1 G/20ML
INJECTION, POWDER, LYOPHILIZED, FOR SOLUTION INTRAVENOUS PRN
Status: DISCONTINUED | OUTPATIENT
Start: 2023-02-27 | End: 2023-02-27 | Stop reason: HOSPADM

## 2023-02-27 RX ADMIN — PHENYLEPHRINE HYDROCHLORIDE 100 MCG: 10 INJECTION INTRAVENOUS at 14:00

## 2023-02-27 RX ADMIN — MIDAZOLAM 2 MG: 1 INJECTION INTRAMUSCULAR; INTRAVENOUS at 11:53

## 2023-02-27 RX ADMIN — SUGAMMADEX 200 MG: 100 INJECTION, SOLUTION INTRAVENOUS at 15:30

## 2023-02-27 RX ADMIN — SODIUM CHLORIDE, POTASSIUM CHLORIDE, SODIUM LACTATE AND CALCIUM CHLORIDE: 600; 310; 30; 20 INJECTION, SOLUTION INTRAVENOUS at 13:14

## 2023-02-27 RX ADMIN — ROCURONIUM BROMIDE 50 MG: 10 INJECTION, SOLUTION INTRAVENOUS at 11:59

## 2023-02-27 RX ADMIN — HYDROMORPHONE HYDROCHLORIDE 0.2 MG: 0.2 INJECTION, SOLUTION INTRAMUSCULAR; INTRAVENOUS; SUBCUTANEOUS at 19:01

## 2023-02-27 RX ADMIN — HYDROMORPHONE HYDROCHLORIDE 0.4 MG: 0.2 INJECTION, SOLUTION INTRAMUSCULAR; INTRAVENOUS; SUBCUTANEOUS at 16:32

## 2023-02-27 RX ADMIN — SODIUM CHLORIDE: 9 INJECTION, SOLUTION INTRAVENOUS at 21:10

## 2023-02-27 RX ADMIN — GLYCOPYRROLATE 0.2 MG: 0.2 INJECTION INTRAMUSCULAR; INTRAVENOUS at 12:06

## 2023-02-27 RX ADMIN — SODIUM CHLORIDE, POTASSIUM CHLORIDE, SODIUM LACTATE AND CALCIUM CHLORIDE: 600; 310; 30; 20 INJECTION, SOLUTION INTRAVENOUS at 10:41

## 2023-02-27 RX ADMIN — PROPOFOL 200 MG: 10 INJECTION, EMULSION INTRAVENOUS at 11:59

## 2023-02-27 RX ADMIN — FENTANYL CITRATE 100 MCG: 50 INJECTION, SOLUTION INTRAMUSCULAR; INTRAVENOUS at 11:59

## 2023-02-27 RX ADMIN — OXYCODONE HYDROCHLORIDE 5 MG: 5 TABLET ORAL at 19:00

## 2023-02-27 RX ADMIN — METHOCARBAMOL 750 MG: 750 TABLET, FILM COATED ORAL at 19:43

## 2023-02-27 RX ADMIN — PHENYLEPHRINE HYDROCHLORIDE 0.5 MCG/KG/MIN: 10 INJECTION INTRAVENOUS at 12:49

## 2023-02-27 RX ADMIN — CEFAZOLIN 1 G: 1 INJECTION, POWDER, FOR SOLUTION INTRAMUSCULAR; INTRAVENOUS at 21:09

## 2023-02-27 RX ADMIN — FENTANYL CITRATE 50 MCG: 50 INJECTION, SOLUTION INTRAMUSCULAR; INTRAVENOUS at 16:18

## 2023-02-27 RX ADMIN — HYDROMORPHONE HYDROCHLORIDE 1 MG: 1 INJECTION, SOLUTION INTRAMUSCULAR; INTRAVENOUS; SUBCUTANEOUS at 15:09

## 2023-02-27 RX ADMIN — GABAPENTIN 300 MG: 300 CAPSULE ORAL at 10:43

## 2023-02-27 RX ADMIN — ONDANSETRON 4 MG: 2 INJECTION INTRAMUSCULAR; INTRAVENOUS at 15:30

## 2023-02-27 RX ADMIN — HYDROMORPHONE HYDROCHLORIDE 0.4 MG: 0.2 INJECTION, SOLUTION INTRAMUSCULAR; INTRAVENOUS; SUBCUTANEOUS at 21:09

## 2023-02-27 RX ADMIN — PHENYLEPHRINE HYDROCHLORIDE 100 MCG: 10 INJECTION INTRAVENOUS at 14:28

## 2023-02-27 RX ADMIN — PHENYLEPHRINE HYDROCHLORIDE 200 MCG: 10 INJECTION INTRAVENOUS at 12:06

## 2023-02-27 RX ADMIN — PHENYLEPHRINE HYDROCHLORIDE 100 MCG: 10 INJECTION INTRAVENOUS at 13:21

## 2023-02-27 RX ADMIN — DEXMEDETOMIDINE HYDROCHLORIDE 0.5 MCG/KG/HR: 100 INJECTION, SOLUTION INTRAVENOUS at 12:12

## 2023-02-27 RX ADMIN — LIDOCAINE HYDROCHLORIDE 2 ML: 10 INJECTION, SOLUTION INFILTRATION; PERINEURAL at 11:59

## 2023-02-27 RX ADMIN — ROCURONIUM BROMIDE 20 MG: 10 INJECTION, SOLUTION INTRAVENOUS at 12:46

## 2023-02-27 RX ADMIN — ALBUMIN (HUMAN): 12.5 SOLUTION INTRAVENOUS at 14:03

## 2023-02-27 RX ADMIN — OXYCODONE HYDROCHLORIDE 5 MG: 5 TABLET ORAL at 19:40

## 2023-02-27 RX ADMIN — DEXAMETHASONE SODIUM PHOSPHATE 10 MG: 10 INJECTION, SOLUTION INTRAMUSCULAR; INTRAVENOUS at 11:59

## 2023-02-27 RX ADMIN — SODIUM CHLORIDE, POTASSIUM CHLORIDE, SODIUM LACTATE AND CALCIUM CHLORIDE: 600; 310; 30; 20 INJECTION, SOLUTION INTRAVENOUS at 12:12

## 2023-02-27 RX ADMIN — ROCURONIUM BROMIDE 30 MG: 10 INJECTION, SOLUTION INTRAVENOUS at 13:26

## 2023-02-27 RX ADMIN — SENNOSIDES AND DOCUSATE SODIUM 1 TABLET: 50; 8.6 TABLET ORAL at 21:09

## 2023-02-27 RX ADMIN — Medication 2 G: at 11:55

## 2023-02-27 RX ADMIN — PHENYLEPHRINE HYDROCHLORIDE 200 MCG: 10 INJECTION INTRAVENOUS at 12:28

## 2023-02-27 RX ADMIN — TRANEXAMIC ACID 705 MG: 1 INJECTION, SOLUTION INTRAVENOUS at 13:03

## 2023-02-27 RX ADMIN — FENTANYL CITRATE 50 MCG: 50 INJECTION, SOLUTION INTRAMUSCULAR; INTRAVENOUS at 16:24

## 2023-02-27 ASSESSMENT — ACTIVITIES OF DAILY LIVING (ADL)
ADLS_ACUITY_SCORE: 37
ADLS_ACUITY_SCORE: 22

## 2023-02-27 ASSESSMENT — LIFESTYLE VARIABLES: TOBACCO_USE: 1

## 2023-02-27 NOTE — PHARMACY-ADMISSION MEDICATION HISTORY
Pharmacy Note - Admission Medication History    Pertinent Provider Information:    ______________________________________________________________________    Prior To Admission (PTA) med list completed and updated in EMR.       PTA Med List   Medication Sig Last Dose     acetaminophen (TYLENOL) 325 MG tablet Take 325-650 mg by mouth every 4 hours as needed for mild pain 2/26/2023     alum & mag hydroxide-simethicone (MYLANTA/MAALOX) 200-200-20 MG/5ML SUSP suspension Take 30 mLs by mouth every 6 hours as needed for indigestion Past Week     gabapentin (NEURONTIN) 100 MG capsule Take 100 mg by mouth 3 times daily as needed Past Month     hydrOXYzine (ATARAX) 25 MG tablet Take 25 mg by mouth every 6 hours as needed for itching 2/26/2023     ibuprofen (ADVIL/MOTRIN) 200 MG tablet Take 400 mg by mouth every 6 hours as needed for mild pain 2/16/2023     loratadine (CLARITIN) 10 MG tablet Take 10 mg by mouth daily as needed for allergies Past Week     melatonin 3 MG tablet Take 3 mg by mouth nightly as needed for sleep Past Week     meloxicam (MOBIC) 15 MG tablet Take 15 mg by mouth daily 2/20/2023     multivitamin w/minerals (THERA-VIT-M) tablet Take 1 tablet by mouth daily 2/16/2023     ondansetron (ZOFRAN ODT) 4 MG ODT tab Take 4 mg by mouth every 8 hours as needed for nausea Past Week     PARoxetine (PAXIL) 10 MG tablet Take 10 mg by mouth every morning 2/27/2023 at am       Information source(s): [facility]    Method of interview communication: in-person    Patient was asked about OTC/herbal products specifically.  PTA med list reflects this.    Based on the pharmacist's assessment, the PTA med list information appears reliable    Medication Affordability:       Allergies were reviewed, assessed, and updated with the patient.      Patient did not bring any medications to the hospital and can't retrieve from home. No multi-dose medications are available for use during hospital stay.       Thank you for the opportunity to participate in the care of this patient.      Santos Lieberman [facility]     2/27/2023     10:37 AM

## 2023-02-27 NOTE — ANESTHESIA PREPROCEDURE EVALUATION
Anesthesia Pre-Procedure Evaluation    Patient: Serenity Jolly   MRN: [id number] : 1975        Procedure : Procedure(s):  RIGHT LUMBAR 4-5 TRANSFORAMINAL LUMBAR INTERBODY FUSION  WITH LEFT LUMBAR 4-5 FACETECTOMY USING STEALTH NAVIGATION          Past Medical History:   Diagnosis Date     Acute coronary syndrome (H)      Alcohol abuse      Anxiety      Arthritis      Cerebral infarction (H)     2 heart attacks 2016     Chronic low back pain      Chronic systolic CHF (congestive heart failure) (H)      Depressive disorder      Heroin abuse (H)      History of cocaine abuse (H)      Motion sickness      Myocardial infarction (H)      Ovarian cyst      Psychosis (H)      Spinal stenosis      Spondylolisthesis of lumbar region      Substance abuse (H)       Past Surgical History:   Procedure Laterality Date     BREAST SURGERY      breast augmentation     COSMETIC SURGERY       ENT SURGERY       GYN SURGERY      drained R ovary     REPAIR LATERAL ANKLE LIGAMENT (ATFL) AND REPAIR OF PERONEAL TENDON LATERAL LEFT ANKLE   2017      Allergies   Allergen Reactions     Sulfa Drugs Hives      Social History     Tobacco Use     Smoking status: Former     Packs/day: 0.50     Types: Cigarettes     Quit date: 2019     Years since quittin.1     Smokeless tobacco: Never     Tobacco comments:     Declines NRT at admission   Substance Use Topics     Alcohol use: Not Currently     Alcohol/week: 2.0 standard drinks     Types: 2 Standard drinks or equivalent per week     Comment: sober      Wt Readings from Last 1 Encounters:   23 70.5 kg (155 lb 6.4 oz)        Anesthesia Evaluation   Pt has had prior anesthetic.     No history of anesthetic complications       ROS/MED HX  ENT/Pulmonary:     (+) tobacco use (quit ),     Neurologic: Comment: Spinal stenosis    (+) CVA,     Cardiovascular:     (+) ---past MI (, in setting of cocaine use) --CHF (last echo normal systolic function. Denies any new symptoms  since then, no angina, syncope, orthopnea, MEJIA.)     METS/Exercise Tolerance:     Hematologic:  - neg hematologic  ROS     Musculoskeletal:       GI/Hepatic:  - neg GI/hepatic ROS     Renal/Genitourinary:  - neg Renal ROS     Endo:  - neg endo ROS     Psychiatric/Substance Use:     (+) alcohol abuse : cocaine and heroin, sober for >2 yrs.    Infectious Disease:       Malignancy:       Other:      (+) , H/O Chronic Pain,        Physical Exam    Airway        Mallampati: II   TM distance: > 3 FB   Neck ROM: full   Mouth opening: > 3 cm    Respiratory Devices and Support         Dental       (+) Modest Abnormalities - crowns, retainers, 1 or 2 missing teeth      Cardiovascular          Rhythm and rate: regular and normal     Pulmonary           breath sounds clear to auscultation           OUTSIDE LABS:  CBC:   Lab Results   Component Value Date    WBC 6.9 02/23/2022    WBC 6.8 01/27/2021    HGB 13.6 02/23/2022    HGB 12.8 01/27/2021    HCT 40.8 02/23/2022    HCT 39.4 01/27/2021     02/23/2022     01/27/2021     BMP:   Lab Results   Component Value Date     02/23/2022     01/27/2021    POTASSIUM 3.7 02/23/2022    POTASSIUM 4.2 01/27/2021    CHLORIDE 105 02/23/2022    CHLORIDE 105 01/27/2021    CO2 31 02/23/2022    CO2 32 01/27/2021    BUN 13 02/23/2022    BUN 15 01/27/2021    CR 0.69 02/23/2022    CR 0.60 01/27/2021     (H) 02/27/2023    GLC 91 02/23/2022     COAGS: No results found for: PTT, INR, FIBR  POC:   Lab Results   Component Value Date    HCG Negative 02/27/2023    HCGS Negative 11/06/2019     HEPATIC:   Lab Results   Component Value Date    ALBUMIN 4.5 02/23/2022    PROTTOTAL 8.0 02/23/2022    ALT 20 02/23/2022    AST 10 02/23/2022    GGT 11 11/08/2019    ALKPHOS 80 02/23/2022    BILITOTAL 0.3 02/23/2022     OTHER:   Lab Results   Component Value Date    MAKAYLA 9.4 02/23/2022    LIPASE 928 (H) 02/23/2022    TSH 0.32 (L) 12/24/2019    T4 1.07 12/24/2019       Anesthesia  Plan    ASA Status:  3   NPO Status:  NPO Appropriate    Anesthesia Type: General.     - Airway: ETT   Induction: Intravenous.      Techniques and Equipment:     - Lines/Monitors: 2nd IV     Consents    Anesthesia Plan(s) and associated risks, benefits, and realistic alternatives discussed. Questions answered and patient/representative(s) expressed understanding.     - Discussed: Risks, Benefits and Alternatives for the PROCEDURE were discussed     - Discussed with:  Patient         Postoperative Care    Pain management: IV analgesics, Oral pain medications.   PONV prophylaxis: Ondansetron (or other 5HT-3), Dexamethasone or Solumedrol     Comments:                Tin Hurd MD

## 2023-02-27 NOTE — OP NOTE
DATE OF SERVICE: 02/27/2023     PREOPERATIVE DIAGNOSES:   1.  Severe bilateral lateral recess L4-5 stenosis  2.  BIlateral L4-5 foraminal stenosis  3.  L4-5 spondylolisthesis  4.  Failure of conservative measures      POSTOPERATIVE DIAGNOSES:   1.  Severe bilateral lateral recess L4-5 stenosis  2.  BIlateral L4-5 foraminal stenosis  3.  L4-5 spondylolisthesis  4.  Failure of conservative measures      PROCEDURE:   1. Right-sided transforaminal/transfacet decompression of the exiting L4 and traversing L5 nerve roots.   2. Transforaminal lumbar interbody fusion L4-5 with autograft bone, WaveDecktronic Elevate interbody 23 x 7.   3. Posterior lateral fusion bilaterally at L4-5.   4. Segmental pedicle screw fixation in the pedicles of L4 and L5 bilaterally  5.  Left L4-5 facetectomy  6. O arm with intra operative navigation     [email or web address] navigated pedicle screws systems.      SURGEON: Dr. Niraj Tao.      ASSISTANT: Thea Padgett PA-C, who was needed for patient positioning, soft tissue retraction, patient safety and assistance with closure of the wound.  She was vital in the protection of the nerve roots as well as the dura.  This could only be performed by a surgical PA trained in spine     ESTIMATED BLOOD LOSS: 80 mL     DRAINS: Hemovac      COMPLICATIONS: None perceived.      SPECIMENS SENT: None.      HISTORY OF PRESENT ILLNESS AND INDICATIONS FOR PROCEDURE:   This is a pleasant 48 year old female that presented with severe bilateral leg pain.  MRI demonstrated severe lateral recess and foraminal stenosis at L4-5 with severe facet overgrowth and spondylolisthesis.  We discussed the risks and benefits which include but are not limited to bleeding, infection, damage to the nerve or dura, failure of procedure to provide pain relief, foot drop, iatrogenic instability, pseudoarthrosis, need for additional procedures, and risks associated with anesthesia.  The intention of the surgery is to treat her buttock  and leg pain and will not reliably treat any back pain. She was completely clear on this.  Following this, she would like to proceed with the fusion.       DESCRIPTION OF PROCEDURE     Therefore, the patient was brought to the operating room. She was intubated via general endotracheal anesthetic and placed on a Russ table with a Daniel frame, care taken to pad all of his bony prominences. Pause for the Cause was performed correctly identifying the patient, procedure, proposed plan and radiographs and all were in agreement. We then dissected down over the L4 hemilamina bilaterally. We dissected over the facet joints at L4-5 bilaterally.  In addition to this, the transverse process was visualized at the L4 and L5 level.  O arm was brought in and a tracer was placed on the L5 spinous process.  An intraoperative CT scan was taken.  We then turned our attention to placing pedicle screws, first using a Midas Chris bur, then a pedicle probe, then a Benites probe to palpate the cannulated pedicle. We then tapped the holes to 5.5 mm.  We then used the Benites probe again to palpate all 4 quadrants of our cannulated pedicle. We then placed 6.5x55 screws bilaterally at L4 and 6.5x40 screws bilaterally in L5.   A subsequent O arm spin was performed and demonstrated the screws in good position.     We then turned our attention to the decompression and interbody.  A laminotomy was made.  The left L4 pars was scored allowing for the removal of the descending articular process of L4 along with the ascending articular process of L5. This gave us our transforaminal/transfacet decompression of the exiting L4, traversing L5 nerve root.  We followed that L4 nerve root all the way out as well as the L5 nerve in the lateral recess. There was clearly no continued neural compression.         We then turned our attention to the left side where again a facetectomy was performed.  Pedicle to pedicle decompression was achieved.  At the disk space  level, we performed a box annulotomy at L4-5, used a combination of ODC curettes, pituitary rongeurs and Kerrison rongeurs to remove the disk material. We took great care not to dig into the endplates. When all of the disk material that we could remove was taken out, we gently scored the endplates. We then placed the interbody graft,  a 23 mm length and 7 mm height, Medtronic Elevate cage. This had good fit and fill.        I then turned my attention back to the left side to remove the remaining ligamentum.  This was removed in a piecemeal fashion.  There were a significant number of blood vessels and one appeared very much like a dural bleb.  This was extensively explored and appeared to be a blood vessel still.  A valsalva was performed with no return of fluid and no expansion of the vessel.       We then placed our connectors and rods and tightened to the 's specifications. Biologic allograft and additional autograft were placed lateral to the screws over the transverse processes.  We then irrigated the wound copiously, had good hemostasis. Vancomycin was placed over the hardware.  The fascia was closed with #1 Vicryl, subcutaneous tissue with 2-0 Vicryl, skin with a 3-0 monocryl.  A sterile dressing was applied. The patient tolerated the procedure well. There were no apparent complications.      She will be weightbearing as tolerated bilateral lower extremities with strict instructions to avoid bending, lifting and twisting over the next 6 weeks. She may have a corset type brace for comfort and have early mobilization and NICOLE stockings for her DVT prophylaxis. She will have 2 grams of Ancef IV q.8 hours x2 doses for antibiotics or until her drain is removed. Return to see me in 6 weeks, sooner if there are any problems, questions or concerns.     Hardware used:  Medtronic Solera screws and rods with end caps  Medtronic Elevate cage

## 2023-02-27 NOTE — CONSULTS
[facility] MEDICINE CONSULT NOTE   Physician requesting consult: Niraj Tao*    Reason for consult: Postoperative medical management of medical co-morbidities as below    Assessment and Plan    Serenity Jolly is a 48 year old old female with a history of remote polysubstance abuse and depression presents for an elective spine surgery HMS service was asked to evaluate patient for postoperative medical management as follows below. Please resume the home medications as reconciled and further noted below with ordered hold parameters.  Vital signs have been stable post-operatively including hemodynamically stable blood pressure and heart rate. Thank you for this consult; we will continue to follow this patient until discharge.    Elective lumbar fusion  Postoperative pain control  PT and OT evaluation  VTE prophylaxis per surgical team    History of polysubstance abuse  Reportedly sober for quite some time  Limit narcotics  Encourage continued sobriety    History of coronary artery disease/chronic diastolic CHF  Euvolemic  Monitor volume status closely    Chronic back pain    Procedure(s):  RIGHT LUMBAR 4-5 TRANSFORAMINAL LUMBAR INTERBODY FUSION  WITH LEFT LUMBAR 4-5 FACETECTOMY USING STEALTH NAVIGATION  Post-operative Day: Day of Surgery  Code status:Prior         Estimated Blood Loss:  100 mL    Hospital Problem List   No problem-specific Assessment & Plan notes found for this encounter.    Principal Problem:    Lumbar back pain      -Reviewed the patient's preoperative H and P and updated missing elements.  -Home medication reconciliation has been reviewed.  Medications have been ordered as noted from the home list and changes are documented above     HISTORY     Serenity Jolly is a 48 year old old female with above history who presents for elective spine surgery.  Please see the operative report for details of the surgery.  Please see the H&P which was reviewed by me  for preoperative course.  Role of hospital medicine discussed and questions answered.  Currently the patient is feeling well.  Not having any shortness of breath chest pain or nausea.  She is having back pain which she states is about an 8 or 9.  She is sleeping but easily arousable.  She does have a history of polysubstance abuse and has anxiety and depression.  She had an MI in the past and had heart failure problems afterwards.  She has a preserved ejection fraction.  She has been sober again for quite some time.  She has chronic back problems.  No history of DVT or PE.  No diabetes.  No sleep apnea.    Past Medical History     Patient Active Problem List    Diagnosis Date Noted     Lumbar back pain 02/27/2023     Priority: Medium     Psychosis (H) 12/21/2019     Priority: Medium     Chemical dependency (H) 11/13/2019     Priority: Medium     Alcohol abuse 11/07/2019     Priority: Medium     Heroin abuse (H) 09/13/2017     Priority: Medium     Ovarian cyst, bilateral 07/27/2015     Priority: Medium     Generalized anxiety disorder 07/27/2015     Priority: Medium     Diagnosis updated by automated process. Provider to review and confirm.          Surgical History   She  has a past surgical history that includes GYN surgery; Breast surgery; Cosmetic surgery; ENT surgery; and REPAIR LATERAL ANKLE LIGAMENT (ATFL) AND REPAIR OF PERONEAL TENDON LATERAL LEFT ANKLE  (2017).     Past Surgical History:   Procedure Laterality Date     BREAST SURGERY      breast augmentation     COSMETIC SURGERY       ENT SURGERY       GYN SURGERY      drained R ovary     REPAIR LATERAL ANKLE LIGAMENT (ATFL) AND REPAIR OF PERONEAL TENDON LATERAL LEFT ANKLE   2017       Family History    Reviewed, and father and mother did not have coronary disease  Social History    Reviewed, and she  reports that she quit smoking about 4 years ago. Her smoking use included cigarettes. She smoked an average of .5 packs per day. She has never used smokeless  tobacco. She reports that she does not currently use alcohol after a past usage of about 2.0 standard drinks per week. She reports that she does not currently use drugs after having used the following drugs: IV and Methamphetamines.  Social History     Tobacco Use     Smoking status: Former     Packs/day: 0.50     Types: Cigarettes     Quit date: 2019     Years since quittin.1     Smokeless tobacco: Never     Tobacco comments:     Declines NRT at admission   Substance Use Topics     Alcohol use: Not Currently     Alcohol/week: 2.0 standard drinks     Types: 2 Standard drinks or equivalent per week     Comment: sober       Allergies     Allergies   Allergen Reactions     Sulfa Drugs Hives       Prior to Admission Medications      Medications Prior to Admission   Medication Sig Dispense Refill Last Dose     acetaminophen (TYLENOL) 325 MG tablet Take 325-650 mg by mouth every 4 hours as needed for mild pain   2023     alum & mag hydroxide-simethicone (MYLANTA/MAALOX) 200-200-20 MG/5ML SUSP suspension Take 30 mLs by mouth every 6 hours as needed for indigestion   Past Week     gabapentin (NEURONTIN) 100 MG capsule Take 100 mg by mouth 3 times daily as needed   Past Month     hydrOXYzine (ATARAX) 25 MG tablet Take 25 mg by mouth every 6 hours as needed for itching   2023     ibuprofen (ADVIL/MOTRIN) 200 MG tablet Take 400 mg by mouth every 6 hours as needed for mild pain   2023     loratadine (CLARITIN) 10 MG tablet Take 10 mg by mouth daily as needed for allergies   Past Week     melatonin 3 MG tablet Take 3 mg by mouth nightly as needed for sleep   Past Week     meloxicam (MOBIC) 15 MG tablet Take 15 mg by mouth daily   2023     multivitamin w/minerals (THERA-VIT-M) tablet Take 1 tablet by mouth daily 30 tablet 0 2023     ondansetron (ZOFRAN ODT) 4 MG ODT tab Take 4 mg by mouth every 8 hours as needed for nausea   Past Week     PARoxetine (PAXIL) 10 MG tablet Take 10 mg by mouth every  morning   2/27/2023 at am       Review of Systems   A 12 point comprehensive review of systems was negative except as noted above.    OBJECTIVE         Physical Exam   Temp:  [97.8  F (36.6  C)-98.2  F (36.8  C)] 97.8  F (36.6  C)  Pulse:  [74-82] 76  Resp:  [12-20] 12  BP: ()/(51-78) 99/56  SpO2:  [96 %-100 %] 96 %  Body mass index is 25.08 kg/m .  GENERAL:  Alert, appears comfortable, in no acute distress, appears stated age   HEAD:  Normocephalic, without obvious abnormality, atraumatic   EYES:  PERRL, conjunctiva/corneas clear, no scleral icterus, EOM's intact   NECK: Supple, symmetrical, trachea midline   BACK:   Symmetric, no curvature, ROM normal   LUNGS:   Clear to auscultation bilaterally, no rales, rhonchi, or wheezing, symmetric chest rise on inhalation, respirations unlabored   HEART:  Regular rate and rhythm, S1 and S2 normal, no murmur, rub, or gallop    ABDOMEN:   Soft, non-tender, bowel sounds active all four quadrants, no masses, no organomegaly, no rebound or guarding   EXTREMITIES: Extremities normal, atraumatic, no cyanosis or edema    SKIN: Dry to touch, no exanthems in the visualized areas   NEURO: Alert, oriented x 4, moves all four extremities freely/spontaneously   PSYCH: Cooperative, behavior is appropriate          Cardiographics Reviewed Personally By Myself   EKG Results: personally reviewed.     Imaging Reviewed Personally By Myself    Radiology Results:   Recent Results (from the past 24 hour(s))   XR Surgery ALPHONSO  Fluoro G/T 5 Min    Narrative    This exam was marked as non-reportable because it will not be read by a   radiologist or a [place] non-radiologist provider.             Labs Reviewed Personally By Myself     Results for orders placed or performed during the hospital encounter of 02/27/23 (from the past 24 hour(s))   HCG qualitative urine - Pre-Op   Result Value Ref Range    hCG Urine Qualitative Negative Negative   Glucose by meter   Result Value Ref Range    GLUCOSE  BY METER POCT 105 (H) 70 - 99 mg/dL   XR Surgery ALPHONSO  Fluoro G/T 5 Min    Narrative    This exam was marked as non-reportable because it will not be read by a   radiologist or a [place] non-radiologist provider.             Preoperative Labs Reviewed Personally By Myself   White count 4.5 hemoglobin 12.7 MCV 97 platelets 278    Thank you for this consultation.  Appreciate the opportunity to participate in the care of Serenity Jolly, please feel free to contact us for any questions or concerns.    Donovan Swenson MD  [facility]  Phone: #[phone]

## 2023-02-27 NOTE — ANESTHESIA PROCEDURE NOTES
Airway         Procedure Start/Stop Times: 2/27/2023 12:01 PM  Staff -        CRNA: Krysta Ojeda APRN CRNA       Performed By: CRNAIndications and Patient Condition       Induction type:intravenous       Mask difficulty assessment: 1 - vent by mask    Final Airway Details       Final airway type: endotracheal airway       Successful airway: ETT - single  Endotracheal Airway Details        ETT size (mm): 7.0       Cuffed: yes       Successful intubation technique: direct laryngoscopy       DL Blade Type: MAC 3       Grade View of Cords: 1       Adjucts: stylet       Position: Right       Measured from: lips       Secured at (cm): 22    Post intubation assessment        Placement verified by: capnometry, equal breath sounds and chest rise        Number of attempts at approach: 1       Secured with: silk tape       Ease of procedure: easy       Dentition: Intact and Unchanged       Dental guard used and removed.    Medication(s) Administered   Medication Administration Time: 2/27/2023 12:01 PM

## 2023-02-27 NOTE — ANESTHESIA CARE TRANSFER NOTE
Patient: Serenity Jolly    Procedure: Procedure(s):  RIGHT LUMBAR 4-5 TRANSFORAMINAL LUMBAR INTERBODY FUSION  WITH LEFT LUMBAR 4-5 FACETECTOMY USING STEALTH NAVIGATION       Diagnosis: Spinal stenosis, lumbar [M48.061]  Spondylolisthesis [M43.10]  Diagnosis Additional Information: No value filed.    Anesthesia Type:   General     Note:    Oropharynx: oropharynx clear of all foreign objects  Level of Consciousness: drowsy  Oxygen Supplementation: face mask  Level of Supplemental Oxygen (L/min / FiO2): 8  Independent Airway: airway patency satisfactory and stable  Dentition: dentition unchanged  Vital Signs Stable: post-procedure vital signs reviewed and stable  Report to RN Given: handoff report given  Patient transferred to: PACU    Handoff Report: Identifed the Patient, Identified the Reponsible Provider, Reviewed the pertinent medical history, Discussed the surgical course, Reviewed Intra-OP anesthesia mangement and issues during anesthesia, Set expectations for post-procedure period and Allowed opportunity for questions and acknowledgement of understanding      Vitals:  Vitals Value Taken Time   /56 02/27/23 1550   Temp 36.6  C (97.8  F) 02/27/23 1549   Pulse 75 02/27/23 1553   Resp 17 02/27/23 1553   SpO2 100 % 02/27/23 1553   Vitals shown include unvalidated device data.    Electronically Signed By: STACY Irwin CRNA  February 27, 2023  3:54 PM

## 2023-02-28 ENCOUNTER — APPOINTMENT (OUTPATIENT)
Dept: PHYSICAL THERAPY | Facility: CLINIC | Age: 48
End: 2023-02-28
Attending: ORTHOPAEDIC SURGERY
Payer: COMMERCIAL

## 2023-02-28 ENCOUNTER — APPOINTMENT (OUTPATIENT)
Dept: OCCUPATIONAL THERAPY | Facility: CLINIC | Age: 48
End: 2023-02-28
Attending: ORTHOPAEDIC SURGERY
Payer: COMMERCIAL

## 2023-02-28 PROCEDURE — 120N000001 HC R&B MED SURG/OB

## 2023-02-28 PROCEDURE — 99254 IP/OBS CNSLTJ NEW/EST MOD 60: CPT | Performed by: NURSE PRACTITIONER

## 2023-02-28 PROCEDURE — 250N000013 HC RX MED GY IP 250 OP 250 PS 637: Performed by: FAMILY MEDICINE

## 2023-02-28 PROCEDURE — 250N000013 HC RX MED GY IP 250 OP 250 PS 637: Performed by: NURSE PRACTITIONER

## 2023-02-28 PROCEDURE — 97116 GAIT TRAINING THERAPY: CPT | Mod: GP

## 2023-02-28 PROCEDURE — 97535 SELF CARE MNGMENT TRAINING: CPT | Mod: GO

## 2023-02-28 PROCEDURE — 99232 SBSQ HOSP IP/OBS MODERATE 35: CPT | Performed by: FAMILY MEDICINE

## 2023-02-28 PROCEDURE — 250N000011 HC RX IP 250 OP 636

## 2023-02-28 PROCEDURE — 97530 THERAPEUTIC ACTIVITIES: CPT | Mod: GP

## 2023-02-28 PROCEDURE — 97161 PT EVAL LOW COMPLEX 20 MIN: CPT | Mod: GP

## 2023-02-28 PROCEDURE — 97166 OT EVAL MOD COMPLEX 45 MIN: CPT | Mod: GO

## 2023-02-28 PROCEDURE — 250N000013 HC RX MED GY IP 250 OP 250 PS 637: Performed by: PHYSICIAN ASSISTANT

## 2023-02-28 PROCEDURE — 250N000011 HC RX IP 250 OP 636: Performed by: PHYSICIAN ASSISTANT

## 2023-02-28 RX ORDER — ACETAMINOPHEN 325 MG/1
975 TABLET ORAL EVERY 6 HOURS
Status: DISCONTINUED | OUTPATIENT
Start: 2023-02-28 | End: 2023-03-03 | Stop reason: HOSPADM

## 2023-02-28 RX ORDER — HYDROMORPHONE HCL IN WATER/PF 6 MG/30 ML
0.4 PATIENT CONTROLLED ANALGESIA SYRINGE INTRAVENOUS EVERY 4 HOURS PRN
Status: DISCONTINUED | OUTPATIENT
Start: 2023-02-28 | End: 2023-03-01

## 2023-02-28 RX ORDER — OXYCODONE HYDROCHLORIDE 5 MG/1
10 TABLET ORAL EVERY 4 HOURS PRN
Status: DISCONTINUED | OUTPATIENT
Start: 2023-02-28 | End: 2023-02-28

## 2023-02-28 RX ORDER — CEFAZOLIN SODIUM 2 G/100ML
2 INJECTION, SOLUTION INTRAVENOUS EVERY 8 HOURS
Status: DISCONTINUED | OUTPATIENT
Start: 2023-02-28 | End: 2023-03-02

## 2023-02-28 RX ORDER — NALOXONE HYDROCHLORIDE 0.4 MG/ML
0.4 INJECTION, SOLUTION INTRAMUSCULAR; INTRAVENOUS; SUBCUTANEOUS
Status: DISCONTINUED | OUTPATIENT
Start: 2023-02-28 | End: 2023-03-03 | Stop reason: HOSPADM

## 2023-02-28 RX ORDER — HYDROMORPHONE HCL IN WATER/PF 6 MG/30 ML
0.2 PATIENT CONTROLLED ANALGESIA SYRINGE INTRAVENOUS EVERY 4 HOURS PRN
Status: DISCONTINUED | OUTPATIENT
Start: 2023-02-28 | End: 2023-03-01

## 2023-02-28 RX ORDER — OXYCODONE HYDROCHLORIDE 5 MG/1
5 TABLET ORAL
Status: DISCONTINUED | OUTPATIENT
Start: 2023-02-28 | End: 2023-02-28

## 2023-02-28 RX ORDER — NALOXONE HYDROCHLORIDE 0.4 MG/ML
0.2 INJECTION, SOLUTION INTRAMUSCULAR; INTRAVENOUS; SUBCUTANEOUS
Status: DISCONTINUED | OUTPATIENT
Start: 2023-02-28 | End: 2023-03-03 | Stop reason: HOSPADM

## 2023-02-28 RX ORDER — HYDROMORPHONE HYDROCHLORIDE 2 MG/1
2-4 TABLET ORAL
Status: DISCONTINUED | OUTPATIENT
Start: 2023-02-28 | End: 2023-03-03 | Stop reason: HOSPADM

## 2023-02-28 RX ORDER — METHOCARBAMOL 750 MG/1
750 TABLET, FILM COATED ORAL EVERY 6 HOURS
Status: DISCONTINUED | OUTPATIENT
Start: 2023-02-28 | End: 2023-03-03 | Stop reason: HOSPADM

## 2023-02-28 RX ADMIN — METHOCARBAMOL 750 MG: 750 TABLET, FILM COATED ORAL at 04:51

## 2023-02-28 RX ADMIN — OXYCODONE HYDROCHLORIDE 10 MG: 5 TABLET ORAL at 08:49

## 2023-02-28 RX ADMIN — METHOCARBAMOL 750 MG: 750 TABLET, FILM COATED ORAL at 20:24

## 2023-02-28 RX ADMIN — OXYCODONE HYDROCHLORIDE 10 MG: 5 TABLET ORAL at 00:03

## 2023-02-28 RX ADMIN — THERA TABS 1 TABLET: TAB at 08:50

## 2023-02-28 RX ADMIN — HYDROXYZINE HYDROCHLORIDE 25 MG: 25 TABLET ORAL at 11:03

## 2023-02-28 RX ADMIN — HYDROMORPHONE HYDROCHLORIDE 0.4 MG: 0.2 INJECTION, SOLUTION INTRAMUSCULAR; INTRAVENOUS; SUBCUTANEOUS at 09:46

## 2023-02-28 RX ADMIN — HYDROXYZINE HYDROCHLORIDE 25 MG: 25 TABLET ORAL at 06:55

## 2023-02-28 RX ADMIN — ACETAMINOPHEN 975 MG: 325 TABLET ORAL at 20:23

## 2023-02-28 RX ADMIN — HYDROXYZINE HYDROCHLORIDE 25 MG: 25 TABLET ORAL at 00:07

## 2023-02-28 RX ADMIN — HYDROMORPHONE HYDROCHLORIDE 4 MG: 2 TABLET ORAL at 20:30

## 2023-02-28 RX ADMIN — CEFAZOLIN 1 G: 1 INJECTION, POWDER, FOR SOLUTION INTRAMUSCULAR; INTRAVENOUS at 04:45

## 2023-02-28 RX ADMIN — CEFAZOLIN SODIUM 2 G: 2 INJECTION, SOLUTION INTRAVENOUS at 12:04

## 2023-02-28 RX ADMIN — HYDROMORPHONE HYDROCHLORIDE 4 MG: 2 TABLET ORAL at 15:17

## 2023-02-28 RX ADMIN — SENNOSIDES AND DOCUSATE SODIUM 1 TABLET: 50; 8.6 TABLET ORAL at 20:23

## 2023-02-28 RX ADMIN — HYDROMORPHONE HYDROCHLORIDE 2 MG: 2 TABLET ORAL at 12:00

## 2023-02-28 RX ADMIN — SENNOSIDES AND DOCUSATE SODIUM 1 TABLET: 50; 8.6 TABLET ORAL at 08:50

## 2023-02-28 RX ADMIN — POLYETHYLENE GLYCOL 3350 17 G: 17 POWDER, FOR SOLUTION ORAL at 08:50

## 2023-02-28 RX ADMIN — OXYCODONE HYDROCHLORIDE 5 MG: 5 TABLET ORAL at 04:50

## 2023-02-28 RX ADMIN — CEFAZOLIN SODIUM 2 G: 2 INJECTION, SOLUTION INTRAVENOUS at 20:23

## 2023-02-28 RX ADMIN — ACETAMINOPHEN 975 MG: 325 TABLET ORAL at 15:17

## 2023-02-28 RX ADMIN — METHOCARBAMOL 750 MG: 750 TABLET, FILM COATED ORAL at 11:08

## 2023-02-28 RX ADMIN — GABAPENTIN 100 MG: 100 CAPSULE ORAL at 00:07

## 2023-02-28 ASSESSMENT — ACTIVITIES OF DAILY LIVING (ADL)
ADLS_ACUITY_SCORE: 22
ADLS_ACUITY_SCORE: 23
ADLS_ACUITY_SCORE: 22
ADLS_ACUITY_SCORE: 23
ADLS_ACUITY_SCORE: 22
ADLS_ACUITY_SCORE: 23
ADLS_ACUITY_SCORE: 22
ADLS_ACUITY_SCORE: 22
ADLS_ACUITY_SCORE: 23

## 2023-02-28 NOTE — PROGRESS NOTES
S: Pt. seen in the [facility]. Female. Dayron HELM. RX: Assess for brace. DX: L4-5 TLIF.  O:A: Today I F/D a Aspen Quikdraw RAP LSO size Medium to support surgical site. Donning doffing wear and care instructions given verbally and hard copy.  P: Pt. to be seen as needed.    Thang SOSA,LO

## 2023-02-28 NOTE — PROGRESS NOTES
02/28/23 0800   Appointment Info   Signing Clinician's Name / Credentials (PT) Elena Jones, PT, DPT   Living Environment   People in Home parent(s);child(alvaro), dependent   Current Living Arrangements house   Home Accessibility stairs to enter home   Number of Stairs, Main Entrance 4   Stair Railings, Main Entrance none   Self-Care   Equipment Currently Used at Home none   Fall history within last six months no   General Information   Onset of Illness/Injury or Date of Surgery 02/27/23   Referring Physician Dr. Niraj Tao   Patient/Family Therapy Goals Statement (PT) Walk and move without pain   Pertinent History of Current Problem (include personal factors and/or comorbidities that impact the POC) S/P L4-5 Fusion   Existing Precautions/Restrictions spinal   Weight-Bearing Status - LLE full weight-bearing   Weight-Bearing Status - RLE full weight-bearing   Bed Mobility   Bed Mobility supine-sit   Supine-Sit [place] (Bed Mobility) minimum assist (75% patient effort);verbal cues   Transfers   Transfers sit-stand transfer   Maintains Weight-bearing Status (Transfers) able to maintain   Sit-Stand Transfer   Sit-Stand [place] (Transfers) contact guard;verbal cues   Assistive Device (Sit-Stand Transfers) walker, front-wheeled   Gait/Stairs (Locomotion)   [place] Level (Gait) contact guard;verbal cues   Assistive Device (Gait) walker, front-wheeled   Distance in Feet 10   Distance in Feet (Gait) 125x2   Pattern (Gait) step-through   Deviations/Abnormal Patterns (Gait) harrison decreased;gait speed decreased   Maintains Weight-bearing Status (Gait) able to maintain   Clinical Impression   Criteria for Skilled Therapeutic Intervention Yes, treatment indicated   PT Diagnosis (PT) Impaired functional mobility   Influenced by the following impairments weakness, pain   Functional limitations due to impairments transfers, gait, stairs   Clinical Presentation (PT Evaluation Complexity)  Stable/Uncomplicated   Clinical Presentation Rationale Pt presents as medically diagnosed   Clinical Decision Making (Complexity) low complexity   Planned Therapy Interventions (PT) gait training;home exercise program;bed mobility training;patient/family education;stair training;strengthening;transfer training   Anticipated Equipment Needs at Discharge (PT) walker, rolling   Risk & Benefits of therapy have been explained evaluation/treatment results reviewed;care plan/treatment goals reviewed;patient   PT Total Evaluation Time   PT Eval, Low Complexity Minutes (64922) 10   Physical Therapy Goals   PT Frequency 2x/day   PT Predicted Duration/Target Date for Goal Attainment 03/03/23   PT Goals Transfers;Gait;Stairs   PT: Transfers Modified independent;Sit to/from stand;Assistive device   PT: Gait Supervision/stand-by assist;Rolling walker;100 feet   PT: Stairs Supervision/stand-by assist;4 stairs   Interventions   Interventions Quick Adds Gait Training;Therapeutic Activity;Therapeutic Procedure   Therapeutic Procedure/Exercise   Treatment Detail/Skilled Intervention verbal review supine and standing spine HEPs   Therapeutic Activity   Symptoms Noted During/After Treatment Increased pain   Treatment Detail/Skilled Intervention Supine to sit with log roll technique and use of rail, min assist, verbal cueing for technique and breathing techniques. Sit<>stand with FWW, CGA, verbal cueing for hand placement. Sit to supine with log roll technique, SBA, cueing for breathing techniques. Reviewed spine precautions   Gait Training   Gait Training Minutes (03055) 15   Symptoms Noted During/After Treatment (Gait Training) increased pain;fatigue   Treatment Detail/Skilled Intervention verbal cueing for safety. Education on importance of ambulation after spine surgery. Education on body mechanics, posture. Stairs: 4 stairs with bilateral rails, reciprocal pattern, CGA, verbal cueing for safety   [place] Level (Gait Training)  contact guard   Physical Assistance Level (Gait Training) verbal cues   Weight Bearing (Gait Training) full weight-bearing   Assistive Device (Gait Training) rolling walker   Pattern Analysis (Gait Training) swing-through gait   Gait Analysis Deviations decreased harrison;decreased step length   Impairments (Gait Analysis/Training) pain   PT Discharge Planning   PT Plan Gait, stairs, spine precautions   PT Discharge Recommendation (DC Rec) (S)  home with assist   PT Rationale for DC Rec Patient ambulating safely with FWW, stairs to enter home without rails, support from family at home   PT Brief overview of current status Amb 125ft with FWW, CGA   Total Session Time   Timed Code Treatment Minutes 15   Total Session Time (sum of timed and untimed services) 25

## 2023-02-28 NOTE — PLAN OF CARE
Goal Outcome Evaluation:    Patient vital signs are at baseline: Yes  Patient able to ambulate as they were prior to admission or with assist devices provided by therapies during their stay:  Yes  Patient MUST void prior to discharge:  Yes, needs two more PVRs < 300 mL  Patient able to tolerate oral intake:  Yes  Pain has adequate pain control using Oral analgesics:  No,  Reason:  IV dilaudid given x1 this shift, Pain Team consult, now on PO Dilaudid  Does patient have an identified :  Yes  Has goal D/C date and time been discussed with patient:  Yes

## 2023-02-28 NOTE — CONSULTS
"ACUPUNCTURIST TREATMENT NOTE    Name: Serenity Jolly  :  1975  MRN:  [id number]    Acupuncture Treatment  Patient Type: Orthopedic  Intervention Reason: Pain  Pain Location: low back  Pre-session Pain Ratin  Post-session Pain Rating: asleep  Patient complaint:: low back pain  Initial insertions: Du 20, Yin Lora, LI 10, SI 3, TW 5, Ling Gu, Da Pam         \"Risks and benefits of acupuncture were discussed with patient. Consent for treatment was given. We thank you for the referral.\"     Maritza De Guzman L.Ac.     Date:  2023  Time:  3:32 PM    "

## 2023-02-28 NOTE — PROGRESS NOTES
02/28/23 0909   Appointment Info   Signing Clinician's Name / Credentials (OT) XIN Ryan   Living Environment   People in Home parent(s);child(alvaro), dependent   Current Living Arrangements house   Transportation Anticipated family or friend will provide   Living Environment Comments No ADs. Pt has walkin shower, standard toilet   Self-Care   Usual Activity Tolerance good   Current Activity Tolerance moderate   Equipment Currently Used at Home none   Fall history within last six months no   Activity/Exercise/Self-Care Comment Pt IND w/ ADLs and IADLs at baseline   General Information   Onset of Illness/Injury or Date of Surgery 02/27/23   Referring Physician Niraj Tao MD   Patient/Family Therapy Goal Statement (OT) feel better w/ less pain   Additional Occupational Profile Info/Pertinent History of Current Problem L4-L5 fusion   Existing Precautions/Restrictions spinal   Cognitive Status Examination   Orientation Status orientation to person, place and time   Visual Perception   Visual Impairment/Limitations WNL   Sensory   Sensory Quick Adds sensation intact   Pain Assessment   Patient Currently in Pain Yes, see Vital Sign flowsheet   Posture   Posture not impaired   Range of Motion Comprehensive   General Range of Motion no range of motion deficits identified   Strength Comprehensive (MMT)   General Manual Muscle Testing (MMT) Assessment no strength deficits identified   Muscle Tone Assessment   Muscle Tone Quick Adds No deficits were identified   Coordination   Upper Extremity Coordination No deficits were identified   Bed Mobility   Bed Mobility scooting/bridging;supine-sit;sit-supine   Comment (Bed Mobility) CGA - significant pain   Transfers   Transfers bed-chair transfer;toilet transfer   Transfer Comments SBA-CGA for transfers   Activities of Daily Living   BADL Assessment/Intervention lower body dressing;bathing;toileting   Bathing Assessment/Intervention   Sisters Level  (Bathing) minimum assist (75% patient effort)   Lower Body Dressing Assessment/Training   [place] Level (Lower Body Dressing) moderate assist (50% patient effort)   Toileting   [place] Level (Toileting) contact guard assist   Clinical Impression   Criteria for Skilled Therapeutic Interventions Met (OT) Yes, treatment indicated   OT Diagnosis decreased ADLs   Influenced by the following impairments lumbar fusion   OT Problem List-Impairments impacting ADL activity tolerance impaired;pain;post-surgical precautions   Assessment of Occupational Performance 3-5 Performance Deficits   Identified Performance Deficits LE dressing, bed mobility, all transfers, bathing, toileting   Planned Therapy Interventions (OT) ADL retraining;bed mobility training;transfer training   Clinical Decision Making Complexity (OT) moderate complexity   Risk & Benefits of therapy have been explained evaluation/treatment results reviewed;patient   OT Total Evaluation Time   OT Eval, Moderate Complexity Minutes (46035) 10   OT Goals   Therapy Frequency (OT) 2 times/day   OT Predicted Duration/Target Date for Goal Attainment 03/06/23   OT Goals Lower Body Dressing;Bed Mobility;Toilet Transfer/Toileting;Transfers   OT: Lower Body Dressing Modified independent;using adaptive equipment;within precautions   OT: Bed Mobility Modified independent;supine to/from sitting;rolling;bridging;within precautions   OT: Transfer Modified independent;with assistive device;within precautions  (walkin shower)   OT: Toilet Transfer/Toileting Modified independent;toilet transfer;cleaning and garment management;within precautions   Interventions   Interventions Quick Adds Self-Care/Home Management   Self-Care/Home Management   Self-Care/Home Mgmt/ADL, Compensatory, Meal Prep Minutes (23385) 5   Symptoms Noted During/After Treatment (Meal Preparation/Planning Training) increased pain   Treatment Detail/Skilled Intervention Pt edu on spinal px - verbalized  understanding. Pt reported increased pain following session w/ PT. Pt edu on log roll technique for bed mobility - completed w/ SBA w/ significant pain. Pt immediately laid down in bed and began crying due to pain. RN notified and will recheck on pt later once pain is under control.   OT Discharge Planning   OT Plan 3/6: bed mobility, all transfers, LE dressing   OT Discharge Recommendation (DC Rec) (S)  home with assist   OT Rationale for DC Rec Anticipate home w/ assist once pain is under control. Pt will have family at home to assist as needed. Pt may benefit from RTS, shower chair, reacher.   OT Brief overview of current status Significant pain   Total Session Time   Timed Code Treatment Minutes 5   Total Session Time (sum of timed and untimed services) 15

## 2023-02-28 NOTE — CONSULTS
[facility] ACUTE PAIN SERVICE CONSULTATION     Date of Admission:  2/27/2023  Date of Consult (When I saw the patient): 02/28/23  Physician requesting consult: Dr. Swenson   Reason for consult: acute post-op pain on chronic pain  Primary Care Physician: Dr. Mars     Assessment/Plan:     Serenity Jolly is a 48 year old female who was admitted on 2/27/2023.  Pain team was asked to see the patient for uncontrolled post op pain. Admitted for planned lumbar fusion. History including CAD, CHF, chronic back pain, polysubstance misuse (sober for 3 yrs per patient report), depression.  Describes pain as 7-8/10 and aching, pressure, spasm, sore. The patient denies nausea, vomiting, constipation, diarrhea, chest pain, shortness of breath. Diet is regular. The patient does not smoke and has history of chemical dependency history(polysubstance)     Post op day: 1 Day Post-Op. RIGHT LUMBAR 4-5 TRANSFORAMINAL LUMBAR INTERBODY FUSION  WITH LEFT LUMBAR 4-5 FACETECTOMY USING STEALTH NAVIGATION     Opioid Induced Respiratory Depression Risk Assessment:?  Moderate: post-op, multiple opioid therapies, concomitant CNS depressants.    Patient not taking opioids prior to surgery, since getting to floor late yesterday afternoon, the patient has utilized a total of 1.4 mg IV Dilaudid, and 35 mg oxycodone.     PLAN:   1) Pain is consistent with post-op pain. Labs and imaging indicated: I have personally reviewed pertinent notes, labs, tests, and radiologic imaging in patient's chart. Treatment plan includes multimodal pain approach, Hospital Medicine Service for medical management, ortho, pt, ot, post op cares. Patient educated regarding multimodal pain approach, medications as listed below. Patient is understanding of the plan. All questions and concerns addressed to patient's satisfaction.   2)Multimodal Medication Therapy  Topical: consider   NSAID'S: none, post-fusion  Steroids: none  Muscle Relaxants: methocarbamol 750 mg  po q 6 h prn muscle spasms - schedule this, she does have recent script for Flexeril but reporting urine retention and dry mouth - will avoid flexeril for now   Adjuvants: schedule APAP - none ordered currently, gabapentin 100 mg po tid prn neuropathic pain(patient reports taking once every 1-2 weeks prn at bedtime at home, not consistent with use), Atarax prn - will discontinue given urine retention   Antidepressants/anxiolytics: Paxil 10 mg po q am  Opioids:  oxycodone 5 mg q 3 h prn, 10 mg q 4 h prn - discontinue, trial dilaudid 2-4 mg q3h prn   IV Pain medication: Dilaudid IV 0.2-0.4 mg q 2 h prn - change to q4h prn    3)Non-medication interventions: ice  Acupuncture consult - offered and agreeable   Integrative consult - offered and agreeable   4)Constipation Prophylaxis: senna/docusate 1 po bid, Miralax daily  5) Care Teams: [facility], Neurosurgery    -Opioid prescriber has been  none  -[place]  pulled from system on 02/28/23. This indicates no opioids on report-time period of last 12 months.  Rx for Phentermine 37.5 mg #56 monthly  Not on [place] :  Checked [place]  also  Surescripts information:  11/03/22 Norco 5/325 #12  2/16/23 gabapentin #90(30 day supply)  01/18/23 gabapentin #90(30 day supply)  Discharge Recommendations - We recommend prescribing the following at the time of discharge: scripts per surgery      History of Present Illness (HPI):       Serenity Jolly is a 48 year old female who presented for planned lumbar fusion.  Past medical history as above. The pain is reported to be acute post-op pain,  located in the low back, and it does radiate to buttocks.  Patient is with chronic back pain. Current pain is rated at 6-8/10 and goal is 2-4/10.      Per [place]  review, the patient does not have an opioid tolerance.     Reviewed medical record, labs, imaging, ED note, and care everywhere.      Home pain medications/psych medications/anticoagulation medications include:  Tylenol, Paxil, ibuprofen, meloxicam,  Atarax, phentermine, gabapentin, has taken Norco in the past, Flexeril    Medical History   PAST MEDICAL HISTORY:   Past Medical History:   Diagnosis Date     Acute coronary syndrome (H)      Alcohol abuse      Anxiety      Arthritis      Cerebral infarction (H)     2 heart attacks 2016     Chronic low back pain      Chronic systolic CHF (congestive heart failure) (H)      Depressive disorder      Heroin abuse (H)      History of cocaine abuse (H)      Motion sickness      Myocardial infarction (H)      Ovarian cyst      Psychosis (H)      Spinal stenosis      Spondylolisthesis of lumbar region      Substance abuse (H)        PAST SURGICAL HISTORY:   Past Surgical History:   Procedure Laterality Date     BREAST SURGERY      breast augmentation     COSMETIC SURGERY       ENT SURGERY       GYN SURGERY      drained R ovary     REPAIR LATERAL ANKLE LIGAMENT (ATFL) AND REPAIR OF PERONEAL TENDON LATERAL LEFT ANKLE   2017       FAMILY HISTORY: History reviewed. No pertinent family history.    SOCIAL HISTORY:   Social History     Tobacco Use     Smoking status: Former     Packs/day: 0.50     Types: Cigarettes     Quit date: 2019     Years since quittin.1     Smokeless tobacco: Never     Tobacco comments:     Declines NRT at admission   Substance Use Topics     Alcohol use: Not Currently     Alcohol/week: 2.0 standard drinks     Types: 2 Standard drinks or equivalent per week     Comment: sober        [facility]  Tobacco:  reports that she quit smoking about 4 years ago. Her smoking use included cigarettes. She smoked an average of .5 packs per day. She has never used smokeless tobacco.  Alcohol:  reports that she does not currently use alcohol after a past usage of about 2.0 standard drinks per week.  Illicit drugs:  reports that she does not currently use drugs after having used the following drugs: IV and Methamphetamines.    Allergies  Allergies   Allergen Reactions     Sulfa Drugs Hives        Problem List  Patient Active Problem List    Diagnosis Date Noted     Lumbar back pain 02/27/2023     Priority: Medium     Psychosis (H) 12/21/2019     Priority: Medium     Chemical dependency (H) 11/13/2019     Priority: Medium     Alcohol abuse 11/07/2019     Priority: Medium     Heroin abuse (H) 09/13/2017     Priority: Medium     Ovarian cyst, bilateral 07/27/2015     Priority: Medium     Generalized anxiety disorder 07/27/2015     Priority: Medium     Diagnosis updated by automated process. Provider to review and confirm.         Prior to Admission Medications   Medications Prior to Admission   Medication Sig Dispense Refill Last Dose     acetaminophen (TYLENOL) 325 MG tablet Take 325-650 mg by mouth every 4 hours as needed for mild pain   2/26/2023     alum & mag hydroxide-simethicone (MYLANTA/MAALOX) 200-200-20 MG/5ML SUSP suspension Take 30 mLs by mouth every 6 hours as needed for indigestion   Past Week     gabapentin (NEURONTIN) 100 MG capsule Take 100 mg by mouth 3 times daily as needed   Past Month     hydrOXYzine (ATARAX) 25 MG tablet Take 25 mg by mouth every 6 hours as needed for itching   2/26/2023     ibuprofen (ADVIL/MOTRIN) 200 MG tablet Take 400 mg by mouth every 6 hours as needed for mild pain   2/16/2023     loratadine (CLARITIN) 10 MG tablet Take 10 mg by mouth daily as needed for allergies   Past Week     melatonin 3 MG tablet Take 3 mg by mouth nightly as needed for sleep   Past Week     meloxicam (MOBIC) 15 MG tablet Take 15 mg by mouth daily   2/20/2023     multivitamin w/minerals (THERA-VIT-M) tablet Take 1 tablet by mouth daily 30 tablet 0 2/16/2023     ondansetron (ZOFRAN ODT) 4 MG ODT tab Take 4 mg by mouth every 8 hours as needed for nausea   Past Week     PARoxetine (PAXIL) 10 MG tablet Take 10 mg by mouth every morning   2/27/2023 at am       Review of Systems  Complete ROS reviewed, unless noted in HPI, all other systems reviewed (with patient) and all others found to be  "negative.      Objective:     Physical Exam:  /64 (BP Location: Right arm)   Pulse 68   Temp 98.4  F (36.9  C) (Oral)   Resp 18   Ht 1.676 m (5' 6\")   Wt 70.5 kg (155 lb 6.4 oz)   SpO2 96%   BMI 25.08 kg/m    Weight:   Vitals:    02/27/23 1015   Weight: 70.5 kg (155 lb 6.4 oz)      Body mass index is 25.08 kg/m .    General Appearance:  Alert, cooperative   Head:  Normocephalic, without obvious abnormality, atraumatic   Eyes:  PERRL, conjunctiva/corneas clear, EOM's intact   ENT/Throat: Lips, mucosa, and tongue normal; teeth and gums normal   Lymph/Neck: Supple, symmetrical, trachea midline   Lungs:   Clear to auscultation bilaterally, respirations unlabored, room air   Cardiovascular/Heart:  Regular rate and rhythm, S1, S2 normal,no murmur, rub or gallop.    Abdomen:   Soft, non-tender, bowel sounds active all four quadrants,  no masses, no organomegaly   Musculoskeletal: Extremities normal, atraumatic   Skin: Incision covered, drain intact   Neurologic: Alert and oriented X 3, Moves all 4 extremities     Psych: Affect is flat, tearful     Imaging: Reviewed I have personally reviewed pertinent labs, tests, and radiologic imaging in patient's chart.  XR Surgery ALPHONSO  Fluoro G/T 5 Min    Result Date: 2/27/2023  This exam was marked as non-reportable because it will not be read by a radiologist or a [place] non-radiologist provider.       Labs: Reviewed I have personally reviewed pertinent labs, tests, and radiologic imaging in patient's chart.  Recent Results (from the past 24 hour(s))   HCG qualitative urine - Pre-Op    Collection Time: 02/27/23 10:27 AM   Result Value Ref Range    hCG Urine Qualitative Negative Negative   Glucose by meter    Collection Time: 02/27/23 10:28 AM   Result Value Ref Range    GLUCOSE BY METER POCT 105 (H) 70 - 99 mg/dL   MRSA Culture    Collection Time: 02/27/23 10:30 AM    Specimen: Nose; Swab   Result Value Ref Range    Culture Culture in progress        Total time spent " 75 minutes with greater than 50% in consultation, education and coordination of care.     Also discussed with RN, Senior Acupuncturist, pharmacist.     Please see A&P for additional details of medical decision making.    Elements of Medical Decision Making as described above. High risk therapy including opioids, high risk drug therapy including oral and/or parenteral controlled substances    Thank you for this consultation.    Liliya KUMAR, BASSEMP-C  Acute Care Pain Management Program  [facility] ([place], [place], [place])  Monday-Friday 8a-4p   Page via online paging system or call [phone]

## 2023-02-28 NOTE — PROGRESS NOTES
Care Management Follow Up    Length of Stay (days): 1    Expected Discharge Date: 03/01/2023     Concerns to be Addressed:       Patient plan of care discussed at interdisciplinary rounds: Yes    Anticipated Discharge Disposition:  Home       Referrals Placed by CM/SW:    Private pay costs discussed: Not applicable    Additional Information:  Chart reviewed, anticipate home at discharge, no CM needs indicated at this time.      HUMBERTO Ardon

## 2023-02-28 NOTE — PROGRESS NOTES
"Orthopedic Progress Note      Assessment: 1 Day Post-Op  S/P Procedure(s):  RIGHT LUMBAR 4-5 TRANSFORAMINAL LUMBAR INTERBODY FUSION  WITH LEFT LUMBAR 4-5 FACETECTOMY USING STEALTH NAVIGATION @    Plan:   - Continue PT/OT  - Weightbearing status: WBAT can use brace for comfort  - No bending, twisting or lifting more than 10 pounds for 6 weeks.  - Drain can be removed when output is <30cc for 2 consecutive shifts or POD3  - Anticoagulation: no chemical prophylaxis in addition to SCDs, adrienne stockings and early ambulation.  - Antibiotics: 2 grams of Ancef IV q 8 hours until her drain is removed  - Orthosis consult placed for LSO brace  - Pain consult  - Discharge planning: pending drain output, BM, pain control and progression in therapy      Subjective:  Pain: Severe pain not well controlled today  Chest pain, SOB: no  Nausea, Vomiting:  no  Lightheadedness, Dizziness:  no  Neuro:  Patient denies new onset numbness or paresthesias    Patient is doing okay this morning notes that her symptoms prior to surgery have resolved but is having severe incisional pain to her low back. No new shooting pain, nausea or vomiting.    Objective:  /64 (BP Location: Right arm)   Pulse 68   Temp 98.4  F (36.9  C) (Oral)   Resp 18   Ht 1.676 m (5' 6\")   Wt 70.5 kg (155 lb 6.4 oz)   SpO2 96%   BMI 25.08 kg/m    The patient is A&Ox3. Appears comfortable.   Sensation is intact to L2-S1  Dorsiflexion and plantar flexion is intact.  LLE Motor: 5/5 quads, 5/5 hip flexors, 5/5 gastroc, 5/5 tib ant, 5/5 ehl  RLE Motor: 5/5 quads, 5/5 hip flexors, 5/5 gastroc, 5/5 tib ant, 5/5 ehl  Dorsalis pedis pulse intact.  Calves are soft and non-tender. Negative Loyda's.  The incision is covered. Dressing C/D/I.    Drain with output of 70/120    Pertinent Labs   Lab Results: personally reviewed.   No results found for: INR, PROTIME  Lab Results   Component Value Date    WBC 6.9 02/23/2022    HGB 13.6 02/23/2022    HCT 40.8 02/23/2022    MCV 97 " 02/23/2022     02/23/2022     Lab Results   Component Value Date     02/23/2022    CO2 31 02/23/2022         Report completed by:  Addis Anderson PA-C/Dr. Tao  [facility]    Date: 2/28/2023  Time: 9:35 AM

## 2023-02-28 NOTE — PROGRESS NOTES
"CLINICAL NUTRITION SERVICES - ASSESSMENT NOTE     Nutrition Prescription    RECOMMENDATIONS FOR MDs/PROVIDERS TO ORDER:  none    Malnutrition Status:    Does not meet criteria    Recommendations already ordered by Registered Dietitian (RD):  none    Future/Additional Recommendations:  none     REASON FOR ASSESSMENT  Serenity Jolly is a/an 48 year old female assessed by the dietitian for Admission Nutrition Risk Screen for wt loss    Pt admitted with lumbar fusion, hx of polysubstance abuse (sober x 3 years), CAD, CHF    NUTRITION HISTORY  NKFA  She states here intake is good and she hasn't lost any wt. She doesn't drink supplements at home    CURRENT NUTRITION ORDERS  Diet: Regular  Intake/Tolerance: Ordered an adequate breakfast today which was first meal ordered here, intake not documented     LABS  Labs reviewed    MEDICATIONS  Medications reviewed, MVI, Miralax, Senokot    ANTHROPOMETRICS  Height: 167.6 cm (5' 6\")  Most Recent Weight: 70.5 kg (155 lb 6.4 oz)    IBW: 59 kg  BMI: Overweight BMI 25-29.9  Weight History:   Wt Readings from Last 10 Encounters:   02/27/23 70.5 kg (155 lb 6.4 oz)   02/23/22 71.7 kg (158 lb)   01/27/21 63.5 kg (140 lb)   12/01/20 62.1 kg (137 lb)   12/26/19 70.3 kg (154 lb 14.4 oz)   11/06/19 63.5 kg (140 lb)   10/13/18 63.5 kg (140 lb)   07/29/15 59.9 kg (132 lb)   07/17/15 61.1 kg (134 lb 9.6 oz)   07/13/15 60.8 kg (134 lb)   Wt appears up     Dosing Weight: 59 kg    ASSESSED NUTRITION NEEDS  Estimated Energy Needs: 3500-5941 kcals/day (25 - 30 kcals/kg)  Justification: Maintenance  Estimated Protein Needs: 59-71 grams protein/day (1 - 1.2 grams of pro/kg)  Justification: Maintenance  Estimated Fluid Needs: 0218-0188 mL/day (25 - 30 mL/kg)   Justification: Maintenance    PHYSICAL FINDINGS  See malnutrition section below.  GI: WDL  Skin: no skin breakdown noted       MALNUTRITION:  % Weight Loss:  None noted  % Intake:  No decreased intake noted  Subcutaneous Fat Loss:  None " observed  Muscle Loss:  None observed  Fluid Retention:  None noted    Malnutrition Diagnosis: Patient does not meet two of the above criteria necessary for diagnosing malnutrition      NUTRITION DIAGNOSIS  No diagnosis noted     INTERVENTIONS  Implementation  None at this time     Goals  Patient to consume % of nutritionally adequate meals three times per day, or the equivalent with supplements/snacks.     Monitoring/Evaluation  Progress toward goals will be monitored and evaluated per protocol.

## 2023-02-28 NOTE — CONSULTS
Integrative Therapy Consult    Healing PresenceYes  Essential Oils: Topical (EO/Topical Oil), Inhalation (EO/Topical oil)     Orthopedic Blend - HC, Lavender Massage Oil - HC       Healing Music: TV/Care channel     Breathwork:       Guided Imagery:       Acupressure: Hands on Li4     [place]shibori:       Energy Therapy:       Healing Touch:       Reiki:       Qi Gong:     Massage: Hand, Foot      Targeted Massage:    Sleep Promotion:       Other Therapy:       Intervention Reason: Anxiety/Stress, Pain     Pre and Post Session Scores: Patient Desires Treatment: yes  Pre-Session Anxiety: 7  Post-session Anxiety: Asleep     Pre-session Pain: 0 Post-session Pain: asleep               Delivery:         Referrals:      Sherrie Castro

## 2023-02-28 NOTE — PLAN OF CARE
Problem: Spinal Surgery  Goal: Optimal Functional Ability  Outcome: Progressing  Intervention: Optimize Functional Status    Problem: Spinal Surgery  Goal: Optimal Pain Control and Function  Outcome: Progressing  Intervention: Prevent or Manage Pain    Patient vital signs are at baseline: Yes  Patient able to ambulate as they were prior to admission or with assist devices provided by therapies during their stay:  No,  Reason:  Due to ambulate.  Patient MUST void prior to discharge:  No,  Reason:  Parry removed this am, due to void.  Patient able to tolerate oral intake:  Yes  Pain has adequate pain control using Oral analgesics:  Yes  Does patient have an identified :  Yes  Has goal D/C date and time been discussed with patient:  Yes

## 2023-02-28 NOTE — PROGRESS NOTES
"[facility]    Medicine Progress Note - Hospitalist Service    Date of Admission:  2/27/2023    Assessment & Plan   Serenity Jolly is a 48 year old old female with a history of remote polysubstance abuse and depression presents for an elective spine surgery HMS service was asked to evaluate patient for postoperative medical management as follows below.      Elective lumbar fusion  Postoperative pain control  PT and OT evaluation  VTE prophylaxis per surgical team     History of polysubstance abuse  Sober for 3 years per report  Limit narcotics  Encourage continued sobriety     History of coronary artery disease/chronic diastolic CHF  Euvolemic  Monitor volume status closely     Acute on chronic back pain  Patient is tearful and crying in the room  \"I am sick of being in pain\"  Talked about seeing pain team  She is open to the idea       Diet: Regular Diet Adult    DVT Prophylaxis: Defer to primary service  Parry Catheter: PRESENT, indication: Anesthesia  Lines: None     Cardiac Monitoring: None  Code Status: Full Code      Clinically Significant Risk Factors Present on Admission                       # Overweight: Estimated body mass index is 25.08 kg/m  as calculated from the following:    Height as of this encounter: 1.676 m (5' 6\").    Weight as of this encounter: 70.5 kg (155 lb 6.4 oz).           Disposition Plan      Expected Discharge Date: 03/01/2023      Destination: home with family            Donovan Swenson MD  Hospitalist Service  [facility]  Securely message with [employer] (more info)  Text page via [employer] Paging/Directory   ______________________________________________________________________    Interval History   No acute issues per nursing other than pain control.  Patient denies alarm symptoms.  She can move her extremities and is having no issues with urination but has not urinated since Parry removal.  Drain is still in place.    Physical " Exam   Vital Signs: Temp: 98.4  F (36.9  C) Temp src: Oral BP: 111/64 Pulse: 68   Resp: 18 SpO2: 96 % O2 Device: None (Room air) Oxygen Delivery: 2 LPM  Weight: 155 lbs 6.4 oz    GENERAL:  Alert, appears comfortable, in no acute distress, appears stated age   HEAD:  Normocephalic, without obvious abnormality, atraumatic   EYES:  PERRL, conjunctiva/corneas clear, no scleral icterus, EOM's intact   BACK:   Symmetric, no curvature, ROM normal   LUNGS:   Clear to auscultation bilaterally, no rales, rhonchi, or wheezing, symmetric chest rise on inhalation, respirations unlabored   HEART:  Regular rate and rhythm, S1 and S2 normal, no murmur, rub, or gallop    ABDOMEN:   Soft, non-tender, bowel sounds active all four quadrants, no masses, no organomegaly, no rebound or guarding   EXTREMITIES: Extremities normal, atraumatic, no cyanosis or edema    SKIN: Dry to touch, no exanthems in the visualized areas   NEURO: Alert, oriented x 4, moves all four extremities freely/spontaneously   PSYCH: Cooperative, behavior is appropriate        Imaging results reviewed over the past 24 hrs:   Recent Results (from the past 24 hour(s))   XR Surgery ALPHONSO  Fluoro G/T 5 Min    Narrative    This exam was marked as non-reportable because it will not be read by a   radiologist or a [place] non-radiologist provider.           Recent Labs   Lab 02/27/23  1028   *

## 2023-02-28 NOTE — ANESTHESIA POSTPROCEDURE EVALUATION
Patient: Serenity Jolly    Procedure: Procedure(s):  RIGHT LUMBAR 4-5 TRANSFORAMINAL LUMBAR INTERBODY FUSION  WITH LEFT LUMBAR 4-5 FACETECTOMY USING STEALTH NAVIGATION       Anesthesia Type:  General    Note:  Disposition: Inpatient   Postop Pain Control: Uneventful            Sign Out: Well controlled pain   PONV: No   Neuro/Psych: Uneventful            Sign Out: Acceptable/Baseline neuro status   Airway/Respiratory: Uneventful            Sign Out: Acceptable/Baseline resp. status   CV/Hemodynamics: Uneventful            Sign Out: Acceptable CV status; No obvious hypovolemia; No obvious fluid overload   Other NRE:    DID A NON-ROUTINE EVENT OCCUR? No           Last vitals:  Vitals Value Taken Time   /79 02/27/23 1900   Temp 37  C (98.6  F) 02/27/23 1900   Pulse 79 02/27/23 1909   Resp 16 02/27/23 1900   SpO2 99 % 02/27/23 1909   Vitals shown include unvalidated device data.    Electronically Signed By: Jasbir Noland MD  February 27, 2023  7:11 PM

## 2023-03-01 ENCOUNTER — APPOINTMENT (OUTPATIENT)
Dept: OCCUPATIONAL THERAPY | Facility: CLINIC | Age: 48
End: 2023-03-01
Attending: ORTHOPAEDIC SURGERY
Payer: COMMERCIAL

## 2023-03-01 ENCOUNTER — APPOINTMENT (OUTPATIENT)
Dept: PHYSICAL THERAPY | Facility: CLINIC | Age: 48
End: 2023-03-01
Attending: ORTHOPAEDIC SURGERY
Payer: COMMERCIAL

## 2023-03-01 LAB
BACTERIA SPEC CULT: NORMAL
HGB BLD-MCNC: 11.5 G/DL (ref 11.7–15.7)

## 2023-03-01 PROCEDURE — 97535 SELF CARE MNGMENT TRAINING: CPT | Mod: GO

## 2023-03-01 PROCEDURE — 99232 SBSQ HOSP IP/OBS MODERATE 35: CPT | Performed by: NURSE PRACTITIONER

## 2023-03-01 PROCEDURE — 120N000001 HC R&B MED SURG/OB

## 2023-03-01 PROCEDURE — 85018 HEMOGLOBIN: CPT

## 2023-03-01 PROCEDURE — 250N000011 HC RX IP 250 OP 636: Performed by: NURSE PRACTITIONER

## 2023-03-01 PROCEDURE — 97116 GAIT TRAINING THERAPY: CPT | Mod: GP

## 2023-03-01 PROCEDURE — 97110 THERAPEUTIC EXERCISES: CPT | Mod: GP

## 2023-03-01 PROCEDURE — 250N000013 HC RX MED GY IP 250 OP 250 PS 637: Performed by: PHYSICIAN ASSISTANT

## 2023-03-01 PROCEDURE — 250N000011 HC RX IP 250 OP 636

## 2023-03-01 PROCEDURE — 250N000013 HC RX MED GY IP 250 OP 250 PS 637: Performed by: NURSE PRACTITIONER

## 2023-03-01 PROCEDURE — 99232 SBSQ HOSP IP/OBS MODERATE 35: CPT | Performed by: FAMILY MEDICINE

## 2023-03-01 PROCEDURE — 250N000013 HC RX MED GY IP 250 OP 250 PS 637: Performed by: FAMILY MEDICINE

## 2023-03-01 RX ORDER — GABAPENTIN 100 MG/1
200 CAPSULE ORAL AT BEDTIME
Status: DISCONTINUED | OUTPATIENT
Start: 2023-03-01 | End: 2023-03-03 | Stop reason: HOSPADM

## 2023-03-01 RX ORDER — GABAPENTIN 100 MG/1
200 CAPSULE ORAL AT BEDTIME
Status: DISCONTINUED | OUTPATIENT
Start: 2023-03-01 | End: 2023-03-01

## 2023-03-01 RX ADMIN — HYDROMORPHONE HYDROCHLORIDE 4 MG: 2 TABLET ORAL at 16:29

## 2023-03-01 RX ADMIN — ACETAMINOPHEN 975 MG: 325 TABLET ORAL at 16:28

## 2023-03-01 RX ADMIN — HYDROMORPHONE HYDROCHLORIDE 4 MG: 2 TABLET ORAL at 11:50

## 2023-03-01 RX ADMIN — METHOCARBAMOL 750 MG: 750 TABLET, FILM COATED ORAL at 17:49

## 2023-03-01 RX ADMIN — ACETAMINOPHEN 975 MG: 325 TABLET ORAL at 08:32

## 2023-03-01 RX ADMIN — CEFAZOLIN SODIUM 2 G: 2 INJECTION, SOLUTION INTRAVENOUS at 11:51

## 2023-03-01 RX ADMIN — HYDROMORPHONE HYDROCHLORIDE 4 MG: 2 TABLET ORAL at 21:24

## 2023-03-01 RX ADMIN — METHOCARBAMOL 750 MG: 750 TABLET, FILM COATED ORAL at 05:20

## 2023-03-01 RX ADMIN — THERA TABS 1 TABLET: TAB at 08:36

## 2023-03-01 RX ADMIN — HYDROMORPHONE HYDROCHLORIDE 4 MG: 2 TABLET ORAL at 02:49

## 2023-03-01 RX ADMIN — METHOCARBAMOL 750 MG: 750 TABLET, FILM COATED ORAL at 00:02

## 2023-03-01 RX ADMIN — ACETAMINOPHEN 975 MG: 325 TABLET ORAL at 21:16

## 2023-03-01 RX ADMIN — PAROXETINE 10 MG: 10 TABLET, FILM COATED ORAL at 08:33

## 2023-03-01 RX ADMIN — CEFAZOLIN SODIUM 2 G: 2 INJECTION, SOLUTION INTRAVENOUS at 21:18

## 2023-03-01 RX ADMIN — GABAPENTIN 200 MG: 100 CAPSULE ORAL at 21:16

## 2023-03-01 RX ADMIN — METHOCARBAMOL 750 MG: 750 TABLET, FILM COATED ORAL at 11:50

## 2023-03-01 RX ADMIN — SENNOSIDES AND DOCUSATE SODIUM 1 TABLET: 50; 8.6 TABLET ORAL at 08:33

## 2023-03-01 RX ADMIN — SENNOSIDES AND DOCUSATE SODIUM 1 TABLET: 50; 8.6 TABLET ORAL at 21:16

## 2023-03-01 RX ADMIN — CEFAZOLIN SODIUM 2 G: 2 INJECTION, SOLUTION INTRAVENOUS at 04:16

## 2023-03-01 RX ADMIN — ACETAMINOPHEN 975 MG: 325 TABLET ORAL at 02:48

## 2023-03-01 RX ADMIN — POLYETHYLENE GLYCOL 3350 17 G: 17 POWDER, FOR SOLUTION ORAL at 08:34

## 2023-03-01 RX ADMIN — HYDROMORPHONE HYDROCHLORIDE 4 MG: 2 TABLET ORAL at 08:33

## 2023-03-01 RX ADMIN — HYDROMORPHONE HYDROCHLORIDE 0.4 MG: 0.2 INJECTION, SOLUTION INTRAMUSCULAR; INTRAVENOUS; SUBCUTANEOUS at 05:20

## 2023-03-01 ASSESSMENT — ACTIVITIES OF DAILY LIVING (ADL)
ADLS_ACUITY_SCORE: 25
ADLS_ACUITY_SCORE: 23
ADLS_ACUITY_SCORE: 25
ADLS_ACUITY_SCORE: 23
ADLS_ACUITY_SCORE: 25

## 2023-03-01 NOTE — PROGRESS NOTES
"[facility]    Medicine Progress Note - Hospitalist Service    Date of Admission:  2/27/2023    Assessment & Plan   Serenity Jolly is a 48 year old old female with a history of remote polysubstance abuse and depression presents for an elective spine surgery HMS service was asked to evaluate patient for postoperative medical management as follows below.      Elective lumbar fusion  Postoperative pain control  PT and OT evaluation  VTE prophylaxis per surgical team     History of polysubstance abuse  Sober for 3 years per report  Limit narcotics  Encourage continued sobriety     History of coronary artery disease/chronic diastolic CHF  Euvolemic  Monitor volume status closely     Acute on chronic back pain  Patient is tearful and crying in the room  \"I am sick of being in pain\"  Pain team input appreciated          Diet: Regular Diet Adult    DVT Prophylaxis: Defer to primary service  Parry Catheter: Not present  Lines: None     Cardiac Monitoring: None  Code Status: Full Code      Clinically Significant Risk Factors                        # Overweight: Estimated body mass index is 25.08 kg/m  as calculated from the following:    Height as of this encounter: 1.676 m (5' 6\").    Weight as of this encounter: 70.5 kg (155 lb 6.4 oz)., PRESENT ON ADMISSION         Disposition Plan      Expected Discharge Date: 03/02/2023      Destination: home with family  Discharge Comments: Pain, drain, voiding issues          Donovan Swenson MD  Hospitalist Service  [facility]  Securely message with [employer] (more info)  Text page via [employer] Paging/Directory   ______________________________________________________________________    Interval History   Patient is doing okay.  Having lots of pain.  Meeting with pain team when I see her.  No alarm symptoms.  Drain is still in place.    Physical Exam   Vital Signs: Temp: 98.4  F (36.9  C) Temp src: Oral BP: 112/64 Pulse: 78   Resp: 16 " SpO2: 97 % O2 Device: None (Room air)    Weight: 155 lbs 6.4 oz    GENERAL:  Alert, appears comfortable, in no acute distress, appears stated age   HEAD:  Normocephalic, without obvious abnormality, atraumatic   EYES:  PERRL, conjunctiva/corneas clear, no scleral icterus, EOM's intact   NECK: Supple, symmetrical, trachea midline   BACK:   Symmetric, no curvature, ROM normal   LUNGS:   Clear to auscultation bilaterally, no rales, rhonchi, or wheezing, symmetric chest rise on inhalation, respirations unlabored   CHEST WALL:  No tenderness or deformity   HEART:  Regular rate and rhythm, S1 and S2 normal, no murmur, rub, or gallop    ABDOMEN:   Soft, non-tender, bowel sounds active all four quadrants, no masses, no organomegaly, no rebound or guarding   EXTREMITIES: Extremities normal, atraumatic, no cyanosis or edema    SKIN: Dry to touch, no exanthems in the visualized areas   NEURO: Alert, oriented x 4, moves all four extremities freely/spontaneously   PSYCH: Cooperative, behavior is appropriate            Data         Imaging results reviewed over the past 24 hrs:   No results found for this or any previous visit (from the past 24 hour(s)).  Recent Labs   Lab 02/27/23  1028   *

## 2023-03-01 NOTE — PROGRESS NOTES
[facility] ACUTE PAIN SERVICE    Daily PAIN Progress Note    Assessment/Plan:  Serenity Jolly is a 48 year old female who was admitted on 2/27/2023.  Pain team was asked to see the patient for uncontrolled post op pain. Admitted for planned lumbar fusion. History including CAD, CHF, chronic back pain, polysubstance misuse (sober for 3 yrs per patient report), depression.  Describes pain as 6/10 and aching, sore, spasm with moving. The patient does not smoke and has history of chemical dependency history(polysubstance)      Post op day: 2 Day Post-Op. RIGHT LUMBAR 4-5 TRANSFORAMINAL LUMBAR INTERBODY FUSION  WITH LEFT LUMBAR 4-5 FACETECTOMY USING STEALTH NAVIGATION      Opioid Induced Respiratory Depression Risk Assessment:?  Moderate: post-op, multiple opioid therapies, concomitant CNS depressants.     PLAN:   1) Pain is consistent with post-op pain. Labs and imaging indicated: I have personally reviewed pertinent notes, labs, tests, and radiologic imaging in patient's chart. Treatment plan includes multimodal pain approach, Hospital Medicine Service for medical management, ortho, pt, ot, post op cares. Patient educated regarding multimodal pain approach, medications as listed below. Patient is understanding of the plan. All questions and concerns addressed to patient's satisfaction.   2)Multimodal Medication Therapy  Topical: consider   NSAID'S: none, post-fusion  Steroids: none  Muscle Relaxants: methocarbamol 750 mg po q 6 h. She does have recent script for Flexeril but reporting urine retention and dry mouth - will avoid flexeril for now given this   Adjuvants: schedule APAP, gabapentin 100 mg po tid prn - schedule 200 mg at bedtime x7 days (patient reports taking once every 1-2 weeks prn at bedtime at home, not consistent with use), Atarax dscontinued given urine retention   Antidepressants/anxiolytics: Paxil 10 mg po q am  Opioids:  dilaudid 2-4 mg q3h prn   IV Pain medication: discontinue  "  3)Non-medication interventions: ice  Acupuncture consult - offered and agreeable   Integrative consult - offered and agreeable   4)Constipation Prophylaxis: senna/docusate 1 po bid, Miralax daily  5) Care Teams: [facility], Neurosurgery     -Opioid prescriber has been  none  -[place]  pulled from system on 02/28/23. This indicates no opioids on report-time period of last 12 months.  Rx for Phentermine 37.5 mg #56 monthly  Not on [place] :  Checked [place]  also  Surescripts information:  11/03/22 Norco 5/325 #12  2/16/23 gabapentin #90(30 day supply)  01/18/23 gabapentin #90(30 day supply)    Subjective:  Reports pain 6/10 and aching. Reports chronic baseline pain is 6/10 daily. Yesterday pain was rated 8/10. She denies nausea, vomiting, constipation, diarrhea, chest pain, shortness of breath, paraesthesia, saddle anesthesia.     Principal Problem:    Lumbar back pain      Objective:  Vital signs in last 24 hours:  /64 (BP Location: Left arm)   Pulse 78   Temp 98.4  F (36.9  C) (Oral)   Resp 16   Ht 1.676 m (5' 6\")   Wt 70.5 kg (155 lb 6.4 oz)   SpO2 97%   BMI 25.08 kg/m    Weight:   Vitals:    02/27/23 1015   Weight: 70.5 kg (155 lb 6.4 oz)      Weight change:   Body mass index is 25.08 kg/m .    Intake/Output last 3 shifts:  I/O last 3 completed shifts:  In: -   Out: 1735 [Urine:1605; Drains:130]  Intake/Output this shift:  I/O this shift:  In: -   Out: 1590 [Urine:1550; Drains:40]    Review of Systems:   As per subjective, all others negative.    Physical Exam:  General Appearance:  Alert, cooperative, no distress, resting in bed   Head:  Normocephalic, without obvious abnormality, atraumatic   Eyes:  PERRL, conjunctiva/corneas clear, EOM's intact   Nose: Nares normal, septum midline   Throat: Lips, mucosa, and tongue normal; teeth and gums normal   Neck: Supple, symmetrical, trachea midline   Back:   Symmetric, no curvature, ROM normal   Lungs:   Clear to auscultation bilaterally, respirations unlabored, room " air   Heart:  Regular rate and rhythm, S1, S2 normal    Abdomen:   Soft, non-tender, bowel sounds active all four quadrants   Extremities: Extremities normal, atraumatic   Skin: Incision covered, drain intact    Neurologic: Alert and oriented X 3, Moves all 4 extremities     Imaging: Reviewed I have personally reviewed pertinent labs, tests, and radiologic imaging in patient's chart.      Labs: Reviewed I have personally reviewed pertinent labs, tests, and radiologic imaging in patient's chart.    Total time spent 35 minutes with greater than 50% in consultation, education and coordination of care.   Also discussed with RN, Hospital Medicine Service MD, Orthopedics, pharmacist.   Please see A&P for additional details of medical decision making.  Elements of Medical Decision Making as described above. High risk therapy including opioids, high risk drug therapy including oral and/or parenteral controlled substances.       ASIF Douglas-RONNIE  Acute Care Pain Management Program  [facility] ([place], [place], [place])  Monday-Friday 8a-4p   Page via online paging system or call [phone]

## 2023-03-01 NOTE — PLAN OF CARE
Occupational Therapy Discharge Summary    Reason for therapy discharge:    All goals and outcomes met, no further needs identified.    Progress towards therapy goal(s). See goals on Care Plan in [facility] electronic health record for goal details.  Goals met    Therapy recommendation(s):    No further therapy is recommended.

## 2023-03-01 NOTE — PROGRESS NOTES
"Orthopedic Progress Note      Assessment: 2 Days Post-Op  S/P Procedure(s):  RIGHT LUMBAR 4-5 TRANSFORAMINAL LUMBAR INTERBODY FUSION  WITH LEFT LUMBAR 4-5 FACETECTOMY USING STEALTH NAVIGATION @    Plan:   - Continue PT/OT  - Weightbearing status: WBAT can use brace for comfort  - No bending, twisting or lifting more than 10 pounds for 6 weeks.  - Drain can be removed when output is <30cc for 2 consecutive shifts or POD3  - Anticoagulation: no chemical prophylaxis in addition to SCDs, adrienne stockings and early ambulation.  - Antibiotics: 2 grams of Ancef IV q 8 hours until her drain is removed  - LSO brace for comfort  - Pain consult, appreciate recs  - Discharge planning: pending drain output, BM, pain control and progression in therapy      Subjective:  Pain: moderate pain better controlled than yesterday but continues to have pain  Chest pain, SOB: no  Nausea, Vomiting:  no  Lightheadedness, Dizziness:  no  Neuro:  Patient denies new onset numbness or paresthesias    Patient is doing okay this morning still notes increased pain to the back but no symptoms down the legs. No new numbness or tingling. Pain is better controlled but still somewhat hard to control    Objective:  /64 (BP Location: Left arm)   Pulse 78   Temp 98.4  F (36.9  C) (Oral)   Resp 16   Ht 1.676 m (5' 6\")   Wt 70.5 kg (155 lb 6.4 oz)   SpO2 97%   BMI 25.08 kg/m    The patient is A&Ox3. Appears comfortable.   Sensation is intact to L2-S1 bilaterally  Dorsiflexion and plantar flexion is intact.  Dorsalis pedis pulse intact.  LLE Motor: 5/5 quads, 5/5 hip flexors, 5/5 gastroc, 5/5 tib ant, 5/5 ehl  RLE Motor: 5/5 quads, 5/5 hip flexors, 5/5 gastroc, 5/5 tib ant, 5/5 ehl   Calves are soft and non-tender. Negative Loyda's.  The incision is covered. Dressing C/D/I.    Drain with 40 out overnight    Pertinent Labs   Lab Results: personally reviewed.   No results found for: INR, PROTIME  Lab Results   Component Value Date    WBC 6.9 02/23/2022 "    HGB 13.6 02/23/2022    HCT 40.8 02/23/2022    MCV 97 02/23/2022     02/23/2022     Lab Results   Component Value Date     02/23/2022    CO2 31 02/23/2022         Report completed by:  Addis Anderson PA-C  [facility]    Date: 3/1/2023  Time: 9:39 AM

## 2023-03-01 NOTE — PLAN OF CARE
Patient vital signs are at baseline: Yes  Patient able to ambulate as they were prior to admission or with assist devices provided by therapies during their stay:  Yes  Patient MUST void prior to discharge:  Yes, still having PVR had a catheter removed this am  Patient able to tolerate oral intake:  Yes  Pain has adequate pain control using Oral analgesics:  Yes  Does patient have an identified :  Yes  Has goal D/C date and time been discussed with patient:  Yes Goal Outcome Evaluation:  Pt alert and oriented, vss on room air, had pain meds given, she requires assist of one to ambulate, had a catheter removed and she is voiding pontenously however she still has  2 more PVR.  Has got a heamovac drain in place.

## 2023-03-01 NOTE — PLAN OF CARE
Problem: Pain Chronic (Persistent) (Comorbidity Management)  Goal: Acceptable Pain Control and Functional Ability  Intervention: Manage Persistent Pain  Recent Flowsheet Documentation  Taken 3/1/2023 0100 by Kiya Awan RN  Medication Review/Management: medications reviewed   Goal Outcome Evaluation:    Contact precautions in place. VSS. A&OX4. Pain treated w/ PO and IV dilaudid. PVR completed was over 300 but pt refused straight cath at first. Pt waited one hour voided again and the PVR wa still over 300 so straight cath was preformed. Three more PVRs due. SBA w/ walker. Discharge pending on recovery.

## 2023-03-01 NOTE — CONSULTS
"ACUPUNCTURIST TREATMENT NOTE    Name: Serenity Jolly  :  1975  MRN:  [id number]    Acupuncture Treatment  Patient Type: Orthopedic  Intervention Reason: Pain  Pain Location: low back  Pre-session Pain Ratin  Post-session Pain Rating: 3  Patient complaint:: low back pain  Initial insertions: (L) (SI 3, BL 62, Phyllis 7, TW 5, St 37, St 39) (R) (Ki 6, Sp 15, St 25, Ling Gu, Da Pam); Dom 3, Du 20, Auricular: (L) (Patton Men, Point zero, lumbosacral)  Number of needles inserted: 16  Number of needles removed: 16         \"Risks and benefits of acupuncture were discussed with patient. Consent for treatment was given. We thank you for the referral.\"     Maritza De Guzman L.Ac.     Date:  3/1/2023  Time:  10:40 AM    "

## 2023-03-02 ENCOUNTER — APPOINTMENT (OUTPATIENT)
Dept: PHYSICAL THERAPY | Facility: CLINIC | Age: 48
End: 2023-03-02
Attending: ORTHOPAEDIC SURGERY
Payer: COMMERCIAL

## 2023-03-02 PROCEDURE — 250N000013 HC RX MED GY IP 250 OP 250 PS 637: Performed by: FAMILY MEDICINE

## 2023-03-02 PROCEDURE — 97116 GAIT TRAINING THERAPY: CPT | Mod: GP

## 2023-03-02 PROCEDURE — 99232 SBSQ HOSP IP/OBS MODERATE 35: CPT | Performed by: FAMILY MEDICINE

## 2023-03-02 PROCEDURE — 120N000001 HC R&B MED SURG/OB

## 2023-03-02 PROCEDURE — 250N000013 HC RX MED GY IP 250 OP 250 PS 637: Performed by: PHYSICIAN ASSISTANT

## 2023-03-02 PROCEDURE — 250N000013 HC RX MED GY IP 250 OP 250 PS 637: Performed by: NURSE PRACTITIONER

## 2023-03-02 PROCEDURE — 250N000011 HC RX IP 250 OP 636: Performed by: PHYSICIAN ASSISTANT

## 2023-03-02 PROCEDURE — 99232 SBSQ HOSP IP/OBS MODERATE 35: CPT | Performed by: NURSE PRACTITIONER

## 2023-03-02 RX ORDER — METHOCARBAMOL 750 MG/1
750 TABLET, FILM COATED ORAL EVERY 6 HOURS
Qty: 120 TABLET | Refills: 0 | Status: SHIPPED | OUTPATIENT
Start: 2023-03-02 | End: 2023-04-01

## 2023-03-02 RX ORDER — DOCUSATE SODIUM 100 MG/1
100 CAPSULE, LIQUID FILLED ORAL 2 TIMES DAILY
Qty: 14 CAPSULE | Refills: 0 | COMMUNITY
Start: 2023-03-02 | End: 2023-03-09

## 2023-03-02 RX ORDER — POLYETHYLENE GLYCOL 3350 17 G/17G
17 POWDER, FOR SOLUTION ORAL DAILY PRN
Qty: 510 G | COMMUNITY
Start: 2023-03-02 | End: 2023-04-01

## 2023-03-02 RX ORDER — ACETAMINOPHEN 325 MG/1
975 TABLET ORAL EVERY 6 HOURS
COMMUNITY
Start: 2023-03-02

## 2023-03-02 RX ADMIN — METHOCARBAMOL 750 MG: 750 TABLET, FILM COATED ORAL at 00:47

## 2023-03-02 RX ADMIN — ACETAMINOPHEN 975 MG: 325 TABLET ORAL at 10:36

## 2023-03-02 RX ADMIN — ACETAMINOPHEN 975 MG: 325 TABLET ORAL at 03:08

## 2023-03-02 RX ADMIN — MAGNESIUM HYDROXIDE 30 ML: 400 SUSPENSION ORAL at 12:35

## 2023-03-02 RX ADMIN — HYDROMORPHONE HYDROCHLORIDE 4 MG: 2 TABLET ORAL at 11:20

## 2023-03-02 RX ADMIN — HYDROMORPHONE HYDROCHLORIDE 4 MG: 2 TABLET ORAL at 20:48

## 2023-03-02 RX ADMIN — HYDROMORPHONE HYDROCHLORIDE 4 MG: 2 TABLET ORAL at 08:09

## 2023-03-02 RX ADMIN — ACETAMINOPHEN 975 MG: 325 TABLET ORAL at 22:40

## 2023-03-02 RX ADMIN — PAROXETINE 10 MG: 10 TABLET, FILM COATED ORAL at 08:09

## 2023-03-02 RX ADMIN — ONDANSETRON 4 MG: 4 TABLET, ORALLY DISINTEGRATING ORAL at 08:28

## 2023-03-02 RX ADMIN — HYDROMORPHONE HYDROCHLORIDE 4 MG: 2 TABLET ORAL at 14:47

## 2023-03-02 RX ADMIN — METHOCARBAMOL 750 MG: 750 TABLET, FILM COATED ORAL at 17:07

## 2023-03-02 RX ADMIN — SENNOSIDES AND DOCUSATE SODIUM 1 TABLET: 50; 8.6 TABLET ORAL at 08:09

## 2023-03-02 RX ADMIN — GABAPENTIN 200 MG: 100 CAPSULE ORAL at 20:48

## 2023-03-02 RX ADMIN — HYDROMORPHONE HYDROCHLORIDE 4 MG: 2 TABLET ORAL at 03:02

## 2023-03-02 RX ADMIN — POLYETHYLENE GLYCOL 3350 17 G: 17 POWDER, FOR SOLUTION ORAL at 08:08

## 2023-03-02 RX ADMIN — METHOCARBAMOL 750 MG: 750 TABLET, FILM COATED ORAL at 06:42

## 2023-03-02 RX ADMIN — SENNOSIDES AND DOCUSATE SODIUM 1 TABLET: 50; 8.6 TABLET ORAL at 20:48

## 2023-03-02 RX ADMIN — ACETAMINOPHEN 975 MG: 325 TABLET ORAL at 17:06

## 2023-03-02 RX ADMIN — THERA TABS 1 TABLET: TAB at 08:10

## 2023-03-02 RX ADMIN — METHOCARBAMOL 750 MG: 750 TABLET, FILM COATED ORAL at 11:20

## 2023-03-02 ASSESSMENT — ACTIVITIES OF DAILY LIVING (ADL)
ADLS_ACUITY_SCORE: 23
ADLS_ACUITY_SCORE: 22
ADLS_ACUITY_SCORE: 23
ADLS_ACUITY_SCORE: 22
ADLS_ACUITY_SCORE: 23
ADLS_ACUITY_SCORE: 22

## 2023-03-02 NOTE — PLAN OF CARE
Goal Outcome Evaluation:    Patient vital signs are at baseline: Yes  Patient able to ambulate as they were prior to admission or with assist devices provided by therapies during their stay:  Yes  Patient MUST void prior to discharge:  Yes  Patient able to tolerate oral intake:  Yes  Pain has adequate pain control using Oral analgesics:  Yes  Does patient have an identified :  Yes  Has goal D/C date and time been discussed with patient:  Yes     VSS. Voiding adequately. PRN oral dilaudid and scheduled muscle relaxers used for pain relief as well as ice and pillow support. Drain removed per orders. Ambulating SBA. Tolerating diet. Plan to discharge today.

## 2023-03-02 NOTE — DISCHARGE INSTRUCTIONS
Pain Team Instructions   - You were prescribed Dilaudid.   - Stop Cyclobenzaprine (Flexeril) and Hydroxyzine (Atarax) due to urinary retention.   - Acetaminophen (Tylenol) may be used as needed for pain. Do not exceed more than 3000 mg of Tylenol per day in a 24 hour period. Many prescription pain medications contain Tylenol  (acetaminophen), including Vicodin , Tylenol #3 , Norco , Lortab , and Percocet .  You should not take any extra pills of Tylenol  if you are using these prescription medications or you can get very sick.    - Oral NSAIDs such as Ibuprofen, Advil, Aleve, Motrin, Celebrex, Meloxicam, Toradol, Voltaren gel should not be used post op. Discuss with your surgery team on when you can resume these.   - Store your medications in a safe and secure place.   - Only take medications prescribed to you and only take them as directed by your doctor. Taking more than the prescribed amount increases your risk for respiratory depression or death.  - Dispose of any unused medications in your home.  Over-the-counter medications and prescription drugs can pollute menjivar and be harmful to humans, fish, and other wildlife when disposed of improperly -- do not flush medications down the toilet or place in the trash.  Properly disposing of medicines is important to prevent abuse or poisoning and protect the environment. Prescription and over-the-counter medications are collected anonymously from residents for free at [facility] drop-off locations usually located at your local police department.   - Opioid pain medications can cause addiction. If you have a history of chemical dependency of any type, you are at a higher risk of becoming addicted to pain medications.  Only take these prescribed medications to treat your pain when all other options have been tried. Take it for as short a time and as few doses as possible. Store your pain pills in a secure place, as they are frequently stolen and provide a dangerous  opportunity for children or visitors in your house to start abusing these powerful medications. We will not replace any lost or stolen medicine.   - As soon as your pain is better, you should seek out a drug take back program (see your local police department) to dispose of your extra medication.   - You have been given a prescription for an opioid (narcotic) pain medicine and/or have received a pain medicine. These medicines can make you drowsy or impaired. You must not drive, operate dangerous equipment, or engage in any other dangerous activities such as drinking alcohol or using illicit drugs while taking these medications. If you drive while taking these medications, you could be arrested for DUI, or driving under the influence. Do not drink any alcohol while you are taking these medications.   - Opioids can have side effects of nausea, vomiting, constipation. Take your medication with food.   - All opioids tend to cause constipation. Drink plenty of water and eat foods that have a lot of fiber, such as fruits, vegetables, prune juice, apple juice and high fiber cereal.  Take a laxative if you don't move your bowels at least every other day. Miralax , Milk of Magnesia, Colace , or Senna  can be used to keep you regular.  You will likely need to continue stool softeners and stimulants while taking opioids. Call your Surgeon or Primary Care Provider if it has been greater than 3 days since your last bowel movement.  - Follow up with Orthopedics

## 2023-03-02 NOTE — PLAN OF CARE
Problem: Spinal Surgery  Goal: Effective Bowel Elimination  Outcome: Progressing  Intervention: Enhance Bowel Motility and Elimination  Recent Flowsheet Documentation  Taken 3/2/2023 0800 by Yasmin Peralta RN  Bowel Elimination Management: (miralax) --        Pt is still quite painful but pain seems to be managed with prn po diladid and scheduled robaxin and ice packs.  Ortho would like to see pt have a bowel movement before she leaves.  Pt states she is passing a little flatus.  Pt really encouraged to ambulate every 2 hrs, drink lots of water and eat more.  Pt is only eating small amts of food and not ambulating much b/o pain.  Given miralax and MOM today.  Surgical dressing changed-CDI.  Pt voiding well.

## 2023-03-02 NOTE — PROGRESS NOTES
Physical Therapy Discharge Summary    Reason for therapy discharge:    All goals and outcomes met, no further needs identified.    Progress towards therapy goal(s). See goals on Care Plan in [facility] electronic health record for goal details.  Goals met    Therapy recommendation(s):    Continue home exercise program.

## 2023-03-02 NOTE — PROGRESS NOTES
"[facility]    Medicine Progress Note - Hospitalist Service    Date of Admission:  2/27/2023    Assessment & Plan   Serenity Jolly is a 48 year old old female with a history of remote polysubstance abuse and depression presents for an elective spine surgery.  Having issues with postoperative pain but otherwise doing well from a medical perspective.     Elective lumbar fusion  Postoperative pain control  PT and OT evaluation  VTE prophylaxis per surgical team    Acute blood loss anemia  Mild and asymptomatic  Drain is out  Outpatient follow-up  No signs of ongoing losses     History of polysubstance abuse  Sober for 3 years per report  Limit narcotics  Encourage continued sobriety     History of coronary artery disease/chronic diastolic CHF  Euvolemic  Monitor volume status closely     Acute on chronic back pain  Pain team input appreciated  Appears more comfortable today  She thinks she is improving    Post procedure constipation  Bowel regimen at home    Diet: Regular Diet Adult    DVT Prophylaxis: Defer to primary service  Parry Catheter: Not present  Lines: None     Cardiac Monitoring: None  Code Status: Full Code                          # Overweight: Estimated body mass index is 25.08 kg/m  as calculated from the following:    Height as of this encounter: 1.676 m (5' 6\").    Weight as of this encounter: 70.5 kg (155 lb 6.4 oz)., PRESENT ON ADMISSION           Donovan Swenson MD  Hospitalist Service  [facility]  Securely message with [employer] (more info)  Text page via [employer] Paging/Directory   ______________________________________________________________________    Interval History   Patient does endorse feeling better.  Pain is still an issue.  No alarm symptoms.  Urinating without issues.  No BM yet.  Passing flatus.  Eating without issues.  Discussed directly with nursing.  Hopefully home soon.    Physical Exam   Vital Signs: Temp: 98.3  F (36.8  C) " Temp src: Oral BP: 117/71 Pulse: 80   Resp: 16 SpO2: 100 % O2 Device: None (Room air)    Weight: 155 lbs 6.4 oz    GENERAL:  Alert, appears comfortable, in no acute distress, appears stated age   HEAD:  Normocephalic, without obvious abnormality, atraumatic   EYES:  PERRL, conjunctiva/corneas clear, no scleral icterus, EOM's intact   BACK:   Symmetric, no curvature, ROM normal   LUNGS:   Clear to auscultation bilaterally, no rales, rhonchi, or wheezing, symmetric chest rise on inhalation, respirations unlabored   CHEST WALL:  No tenderness or deformity   HEART:  Regular rate and rhythm, S1 and S2 normal, no murmur, rub, or gallop    ABDOMEN:   Soft, non-tender, bowel sounds active all four quadrants, no masses, no organomegaly, no rebound or guarding   EXTREMITIES: Extremities normal, atraumatic, no cyanosis or edema    SKIN: Dry to touch, no exanthems in the visualized areas   NEURO: Alert, oriented x 4, moves all four extremities freely/spontaneously   PSYCH: Cooperative, behavior is appropriate          Data     I have personally reviewed the following data over the past 24 hrs:    N/A  \   11.5 (L)   / N/A     N/A N/A N/A /  N/A   N/A N/A N/A \       Imaging results reviewed over the past 24 hrs:   No results found for this or any previous visit (from the past 24 hour(s)).  Recent Labs   Lab 03/01/23  1718 02/27/23  1028   HGB 11.5*  --    GLC  --  105*

## 2023-03-02 NOTE — PROGRESS NOTES
[facility] ACUTE PAIN SERVICE    Daily PAIN Progress Note    Assessment/Plan:  Serenity Jolly is a 48 year old female who was admitted on 2/27/2023.  Pain team was asked to see the patient for uncontrolled post op pain. Admitted for planned lumbar fusion. History including CAD, CHF, chronic back pain, polysubstance misuse (sober for 3 yrs per patient report), depression.   The patient does not smoke and has history of chemical dependency history(polysubstance)      Post op day: 3 Day Post-Op. RIGHT LUMBAR 4-5 TRANSFORAMINAL LUMBAR INTERBODY FUSION  WITH LEFT LUMBAR 4-5 FACETECTOMY USING STEALTH NAVIGATION      Opioid Induced Respiratory Depression Risk Assessment:?  Moderate: post-op, multiple opioid therapies, concomitant CNS depressants.     PLAN:   1) Pain is consistent with post-op pain. Labs and imaging indicated: I have personally reviewed pertinent notes, labs, tests, and radiologic imaging in patient's chart. Treatment plan includes multimodal pain approach, Hospital Medicine Service for medical management, ortho, pt, ot, post op cares. Patient educated regarding multimodal pain approach, medications as listed below. Patient is understanding of the plan. All questions and concerns addressed to patient's satisfaction.   2)Multimodal Medication Therapy  Topical: consider   NSAID'S: none, post-fusion  Steroids: none  Muscle Relaxants: methocarbamol 750 mg po q 6 h. She does have recent script for Flexeril but reporting urine retention and dry mouth - will avoid flexeril for now given this   Adjuvants: schedule APAP, gabapentin 100 mg po tid prn - schedule 200 mg at bedtime x7 days (patient reports taking once every 1-2 weeks prn at bedtime at home, not consistent with use), Atarax dscontinued given urine retention   Antidepressants/anxiolytics: Paxil 10 mg po q am  Opioids:  dilaudid 2-4 mg q3h prn   IV Pain medication: discontinue   3)Non-medication interventions: ice, LSO brace  Acupuncture  "consult - offered and agreeable   Integrative consult - offered and agreeable   4)Constipation Prophylaxis: senna/docusate 1 po bid, Miralax daily  5) Care Teams: [facility] [place], Neurosurgery     -Opioid prescriber has been  none  -[place]  pulled from system on 02/28/23. This indicates no opioids on report-time period of last 12 months.  Rx for Phentermine 37.5 mg #56 monthly  Not on [place] :  Checked [place]  also  Surescripts information:  11/03/22 Norco 5/325 #12  2/16/23 gabapentin #90(30 day supply)  01/18/23 gabapentin #90(30 day supply)    Discharge recommendations:   Scripts per ICSI guidelines per surgery, APAP, Robaxin, Gabapentin x7 days. Stop Flexeril, stop hydroxyzine.     Pain team will sign off. Discussed with ortho.     Subjective:  Reports pain 6/10 and aching with some radiation of pain into both hips. She reports feeling better today and looking forward to therapy. No radiation of pain past hips and no new numbness or tingling. Reports constipation. Urinating without issues.  Denies nausea, vomiting, diarrhea, chest pain, shortness of breath, paraesthesia, saddle anesthesia. She does feel although she still has pain, her pain is managed with current medications. Hopes to go home soon.     Principal Problem:    Lumbar back pain      Objective:  Vital signs in last 24 hours:  /71 (BP Location: Right arm)   Pulse 80   Temp 98.3  F (36.8  C) (Oral)   Resp 16   Ht 1.676 m (5' 6\")   Wt 70.5 kg (155 lb 6.4 oz)   SpO2 100%   BMI 25.08 kg/m    Weight:     Vitals:    02/27/23 1015   Weight: 70.5 kg (155 lb 6.4 oz)      Weight change:   Body mass index is 25.08 kg/m .    Intake/Output last 3 shifts:  I/O last 3 completed shifts:  In: 240 [P.O.:240]  Out: 2460 [Urine:2400; Drains:60]  Intake/Output this shift:  No intake/output data recorded.    Review of Systems:   As per subjective, all others negative.    Physical Exam:  General Appearance:  Alert, cooperative, no distress   Head:  Normocephalic, without " obvious abnormality, atraumatic   Eyes:  PERRL, conjunctiva/corneas clear, EOM's intact   Nose: Nares normal, septum midline   Throat: Lips, mucosa, and tongue normal; teeth and gums normal   Neck: Supple, symmetrical, trachea midline   Back:   Symmetric, no curvature, ROM normal   Lungs:   Clear to auscultation bilaterally, respirations unlabored, room air   Heart:  Regular rate and rhythm, S1, S2 normal    Abdomen:   Soft, non-tender, bowel sounds active all four quadrants   Extremities: Extremities normal, atraumatic   Skin: Incision covered, drain has now been removed     Neurologic: Alert and oriented X 3, Moves all 4 extremities     Imaging: Reviewed I have personally reviewed pertinent labs, tests, and radiologic imaging in patient's chart.      Labs: Reviewed I have personally reviewed pertinent labs, tests, and radiologic imaging in patient's chart.    Total time spent 35 minutes with greater than 50% in consultation, education and coordination of care.   Also discussed with RN, Hospital Medicine Service MD, Orthopedics, pharmacist.   Please see A&P for additional details of medical decision making.  Elements of Medical Decision Making as described above. High risk therapy including opioids, high risk drug therapy including oral and/or parenteral controlled substances.       BASSEM DouglasP-C  Acute Care Pain Management Program  [facility] ([place], [place], [place])  Monday-Friday 8a-4p   Page via online paging system or call [phone]

## 2023-03-02 NOTE — CONSULTS
"ACUPUNCTURIST TREATMENT NOTE    Name: Serenity Jolly  :  1975  MRN:  [id number]    Acupuncture Treatment  Patient Type: Orthopedic  Intervention Reason: Pain  Pain Location: low back  Pre-session Pain Ratin  Post-session Pain Rating: asleep  Patient complaint:: low back pain with pain radiating into (L) hip and LLE  Initial insertions: Du 20, Scalp x2, Yin Lora, (L) (LI 10, TW 5, Ling Gu, Da Pam, Fidel Pam); (L) (GB 34, BL 60, BL 62, GB 41)  Number of needles inserted: 13  Number of needles removed: 13         \"Risks and benefits of acupuncture were discussed with patient. Consent for treatment was given. We thank you for the referral.\"     Maritza De Guzman L.Ac.     Date:  3/2/2023  Time:  10:55 AM    "

## 2023-03-02 NOTE — PROGRESS NOTES
"Orthopedic Progress Note      Assessment: 3 Days Post-Op  S/P Procedure(s):  RIGHT LUMBAR 4-5 TRANSFORAMINAL LUMBAR INTERBODY FUSION  WITH LEFT LUMBAR 4-5 FACETECTOMY USING STEALTH NAVIGATION     Plan:   - Continue PT/OT  - Weightbearing status: WBAT can use brace for comfort  - No bending, twisting or lifting more than 10 pounds for 6 weeks.  - Anticoagulation: no chemical prophylaxis in addition to SCDs, adrienne stockings and early ambulation.  - LSO brace for comfort  - Pain consult, appreciate recs  - encourage eating, watch for nausea  - Discharge planning:  BM, pain control and progression in therapy      Subjective:  Pain: moderate pain better controlled than yesterday but continues to have pain  Chest pain, SOB: no  Nausea, Vomiting:  no  Lightheadedness, Dizziness:  no  Neuro:  Patient denies new onset numbness or paresthesias    Patient is doing okay this morning still notes increased pain to the back but no symptoms down the legs. No new numbness or tingling. Pain is better controlled but still somewhat hard to control    Objective:  /71 (BP Location: Right arm)   Pulse 80   Temp 98.3  F (36.8  C) (Oral)   Resp 16   Ht 1.676 m (5' 6\")   Wt 70.5 kg (155 lb 6.4 oz)   SpO2 100%   BMI 25.08 kg/m    The patient is A&Ox3. Appears comfortable.   Sensation is intact to L2-S1 bilaterally  Dorsiflexion and plantar flexion is intact.  Dorsalis pedis pulse intact.  LLE Motor: 5/5 quads, 5/5 hip flexors, 5/5 gastroc, 5/5 tib ant, 5/5 ehl  RLE Motor: 5/5 quads, 5/5 hip flexors, 5/5 gastroc, 5/5 tib ant, 5/5 ehl   Calves are soft and non-tender. Negative Loyda's.  The incision is covered. Dressing C/D/I.    Pertinent Labs   Lab Results: personally reviewed.   No results found for: INR, PROTIME  Lab Results   Component Value Date    WBC 6.9 02/23/2022    HGB 11.5 (L) 03/01/2023    HCT 40.8 02/23/2022    MCV 97 02/23/2022     02/23/2022     Lab Results   Component Value Date     02/23/2022    CO2 31 " 02/23/2022         Report completed by:  Addis Anderson PA-C  [facility]    03/02/23

## 2023-03-02 NOTE — PLAN OF CARE
Goal Outcome Evaluation:    Patient vital signs are at baseline: Yes  Patient able to ambulate as they were prior to admission or with assist devices provided by therapies during their stay:  Yes  Patient MUST void prior to discharge:  Yes, three minimal PVRs today  Patient able to tolerate oral intake:  Yes  Pain has adequate pain control using Oral analgesics:  Yes, pain controlled with PO Dilaudid, Robaxin, Tylenol, ice, repositioning  Does patient have an identified :  Yes  Has goal D/C date and time been discussed with patient:  Yes

## 2023-03-03 VITALS
WEIGHT: 155.4 LBS | HEIGHT: 66 IN | RESPIRATION RATE: 16 BRPM | TEMPERATURE: 97.5 F | DIASTOLIC BLOOD PRESSURE: 69 MMHG | HEART RATE: 87 BPM | BODY MASS INDEX: 24.98 KG/M2 | OXYGEN SATURATION: 95 % | SYSTOLIC BLOOD PRESSURE: 113 MMHG

## 2023-03-03 PROCEDURE — 250N000013 HC RX MED GY IP 250 OP 250 PS 637: Performed by: NURSE PRACTITIONER

## 2023-03-03 PROCEDURE — 250N000011 HC RX IP 250 OP 636: Performed by: PHYSICIAN ASSISTANT

## 2023-03-03 PROCEDURE — 250N000013 HC RX MED GY IP 250 OP 250 PS 637: Performed by: FAMILY MEDICINE

## 2023-03-03 PROCEDURE — 99232 SBSQ HOSP IP/OBS MODERATE 35: CPT | Performed by: FAMILY MEDICINE

## 2023-03-03 PROCEDURE — 250N000013 HC RX MED GY IP 250 OP 250 PS 637: Performed by: PHYSICIAN ASSISTANT

## 2023-03-03 RX ORDER — HYDROMORPHONE HYDROCHLORIDE 2 MG/1
2-4 TABLET ORAL
Qty: 26 TABLET | Refills: 0 | Status: SHIPPED | OUTPATIENT
Start: 2023-03-03

## 2023-03-03 RX ADMIN — HYDROMORPHONE HYDROCHLORIDE 4 MG: 2 TABLET ORAL at 00:18

## 2023-03-03 RX ADMIN — METHOCARBAMOL 750 MG: 750 TABLET, FILM COATED ORAL at 12:27

## 2023-03-03 RX ADMIN — ACETAMINOPHEN 975 MG: 325 TABLET ORAL at 04:55

## 2023-03-03 RX ADMIN — SENNOSIDES AND DOCUSATE SODIUM 1 TABLET: 50; 8.6 TABLET ORAL at 10:06

## 2023-03-03 RX ADMIN — HYDROMORPHONE HYDROCHLORIDE 4 MG: 2 TABLET ORAL at 10:07

## 2023-03-03 RX ADMIN — METHOCARBAMOL 750 MG: 750 TABLET, FILM COATED ORAL at 00:18

## 2023-03-03 RX ADMIN — POLYETHYLENE GLYCOL 3350 17 G: 17 POWDER, FOR SOLUTION ORAL at 10:05

## 2023-03-03 RX ADMIN — METHOCARBAMOL 750 MG: 750 TABLET, FILM COATED ORAL at 06:35

## 2023-03-03 RX ADMIN — ONDANSETRON 4 MG: 4 TABLET, ORALLY DISINTEGRATING ORAL at 14:02

## 2023-03-03 RX ADMIN — ACETAMINOPHEN 975 MG: 325 TABLET ORAL at 10:05

## 2023-03-03 RX ADMIN — THERA TABS 1 TABLET: TAB at 10:07

## 2023-03-03 RX ADMIN — PAROXETINE 10 MG: 10 TABLET, FILM COATED ORAL at 10:06

## 2023-03-03 RX ADMIN — HYDROMORPHONE HYDROCHLORIDE 4 MG: 2 TABLET ORAL at 04:54

## 2023-03-03 RX ADMIN — HYDROMORPHONE HYDROCHLORIDE 4 MG: 2 TABLET ORAL at 14:02

## 2023-03-03 ASSESSMENT — ACTIVITIES OF DAILY LIVING (ADL)
ADLS_ACUITY_SCORE: 22

## 2023-03-03 NOTE — PLAN OF CARE
Problem: Plan of Care - These are the overarching goals to be used throughout the patient stay.    Goal: Readiness for Transition of Care  Outcome: Met     Pain controlled with po pain meds.  Still no bowel movement but eating and drinking more, taking laxatives, lots of flatus and very active bowel sounds.  Encourage lots of walking at home.  Ortho okay with pt discharging home.  Pt verbalizes understanding of discharge and follow up instructions.

## 2023-03-03 NOTE — CONSULTS
"ACUPUNCTURIST TREATMENT NOTE    Name: Serenity Jolly  :  1975  MRN:  [id number]    Acupuncture Treatment  Patient Type: Orthopedic  Intervention Reason: Pain, Headache  Pain Location: low back  Pre-session Pain Ratin  Post-session Pain Ratin  Pre-session Headache Ratin  Post-session Headache Rating: 3  Patient complaint:: Low back pain and headache  Initial insertions: Du 20, Gb 20, Cristhian tang, Li 4, 10, (L) Si 3, (R) Bl 60, 62, 65  Number of needles inserted: 13  Number of needles removed: 13         \"Risks and benefits of acupuncture were discussed with patient. Consent for treatment was given. We thank you for the referral.\"     Juan Manuel Hurd    Date:  3/3/2023  Time:  12:43 PM    "

## 2023-03-03 NOTE — PROGRESS NOTES
"Orthopedic Progress Note      Assessment: 4 Days Post-Op  S/P Procedure(s):  RIGHT LUMBAR 4-5 TRANSFORAMINAL LUMBAR INTERBODY FUSION  WITH LEFT LUMBAR 4-5 FACETECTOMY USING STEALTH NAVIGATION     Plan:   - Continue PT/OT  - Weightbearing status: WBAT can use brace for comfort  - No bending, twisting or lifting more than 10 pounds for 6 weeks.  - Anticoagulation: no chemical prophylaxis in addition to SCDs, adrienne stockings and early ambulation.  - LSO brace for comfort  - Pain consult, appreciate recs  - encourage eating, watch for nausea  - Discharge planning:  BM, pain control and progression in therapy    Subjective:  Pain: moderate pain better controlled than yesterday but continues to have pain  Chest pain, SOB: no  Nausea, Vomiting:  no  Lightheadedness, Dizziness:  no  Neuro:  Patient denies new onset numbness or paresthesias     Patient is doing okay this morning still notes increased pain to the back but no symptoms down the legs. No new numbness or tingling. Pain is better controlled.  Still has not had bowel movement but is eating more.  Not able to tolerate sitting up but has mobilized slightly more than yesterday.    Objective:  /69 (BP Location: Right arm)   Pulse 87   Temp 97.5  F (36.4  C) (Oral)   Resp 16   Ht 1.676 m (5' 6\")   Wt 70.5 kg (155 lb 6.4 oz)   SpO2 95%   BMI 25.08 kg/m    The patient is A&Ox3. Appears comfortable.   Sensation is intact to L2-S1 bilaterally  Dorsiflexion and plantar flexion is intact.  Dorsalis pedis pulse intact.  LLE Motor: 5/5 quads, 5/5 hip flexors, 5/5 gastroc, 5/5 tib ant, 5/5 ehl  RLE Motor: 5/5 quads, 5/5 hip flexors, 5/5 gastroc, 5/5 tib ant, 5/5 ehl   Calves are soft and non-tender. Negative Loyda's.  The incision is covered. Dressing C/D/I.    Pertinent Labs   Lab Results: personally reviewed.   No results found for: INR, PROTIME  Lab Results   Component Value Date    WBC 6.9 02/23/2022    HGB 11.5 (L) 03/01/2023    HCT 40.8 02/23/2022    MCV 97 " 02/23/2022     02/23/2022     Lab Results   Component Value Date     02/23/2022    CO2 31 02/23/2022         Report completed by:  Addis Anderson PA-C  [facility]    Date: 3/3/2023  Time: 11:45 AM

## 2023-03-03 NOTE — PLAN OF CARE
Patient vital signs are at baseline: Yes  Patient able to ambulate as they were prior to admission or with assist devices provided by therapies during their stay:  Yes, stand by assist with walker  Patient MUST void prior to discharge:  Yes  Patient able to tolerate oral intake:  Yes  Pain has adequate pain control using Oral analgesics:  Yes  Does patient have an identified :  Yes  Has goal D/C date and time been discussed with patient:  Yes    Alert and oriented x 4, able to make needs known, CMS intact, dsg CDI, reported passing flatus, BS audible, no BM this shift, encouraged fluids and ambulation

## 2023-03-03 NOTE — PLAN OF CARE
Problem: Spinal Surgery  Goal: Effective Bowel Elimination  Outcome: Not Progressing   Goal Outcome Evaluation:       Pt complains of lower back pain. PRN dilaudid given with minimal relief noted. Pt encouraged to reposition to help with pain. Ice offered. Pt ambulated in the cabrera on shift. VSS. Adequate oral intake. No BM this shift. Bed alarm on.

## 2023-03-03 NOTE — PROGRESS NOTES
"[facility]    Medicine Progress Note - Hospitalist Service    Date of Admission:  2/27/2023    Assessment & Plan   Serenity Jolly is a 48 year old old female with a history of remote polysubstance abuse and depression presents for an elective spine surgery.  Having issues with postoperative pain but otherwise doing well from a medical perspective.     Elective lumbar fusion  Postoperative pain control  PT and OT evaluation  VTE prophylaxis per surgical team     Acute blood loss anemia  Mild and asymptomatic  Drain is out  Outpatient follow-up  No signs of ongoing losses     History of polysubstance abuse  Sober for 3 years per report  Limit narcotics  Encourage continued sobriety     History of coronary artery disease/chronic diastolic CHF  Euvolemic  Monitor volume status closely     Acute on chronic back pain  Pain team input appreciated  Appears more comfortable today  She thinks she is improving     Post procedure constipation  Bowel regimen at home          Diet: Regular Diet Adult  Discharge Instruction - Regular Diet Adult    DVT Prophylaxis: Defer to primary service  Parry Catheter: Not present  Lines: None     Cardiac Monitoring: None  Code Status: Full Code      Clinically Significant Risk Factors                        # Overweight: Estimated body mass index is 25.08 kg/m  as calculated from the following:    Height as of this encounter: 1.676 m (5' 6\").    Weight as of this encounter: 70.5 kg (155 lb 6.4 oz).          Disposition Plan      Expected Discharge Date: 03/03/2023,  9:00 AM    Destination: home with family  Discharge Comments: constipation/nausea          Donovan Swenson MD  Hospitalist Service  [facility]  Securely message with [employer] (more info)  Text page via [email or web address] Paging/Directory   ______________________________________________________________________    Interval History   Doing okay.  Pain is better.  Still having a significant " amount of pain.  No bowel or bladder dysfunction.  No saddle anesthesia.  No weakness in the extremities.  She was up sitting in the chair earlier.  She is been able to eat.  Urinating.  Had not had a bowel movement yet at the point of my visit.    Physical Exam   Vital Signs: Temp: 97.5  F (36.4  C) Temp src: Oral BP: 113/69 Pulse: 87   Resp: 16 SpO2: 95 % O2 Device: None (Room air)    Weight: 155 lbs 6.4 oz    GENERAL:  Alert, appears comfortable, in no acute distress, appears stated age   HEAD:  Normocephalic, without obvious abnormality, atraumatic   EYES:  PERRL, conjunctiva/corneas clear, no scleral icterus, EOM's intact   NECK: Supple, symmetrical, trachea midline   BACK:   Symmetric, no curvature, ROM normal   LUNGS:   Clear to auscultation bilaterally, no rales, rhonchi, or wheezing, symmetric chest rise on inhalation, respirations unlabored   HEART:  Regular rate and rhythm, S1 and S2 normal, no murmur, rub, or gallop    ABDOMEN:   Soft, non-tender, bowel sounds active all four quadrants, no masses, no organomegaly, no rebound or guarding   SKIN: Dry to touch, no exanthems in the visualized areas   NEURO: Alert, oriented x 4, moves all four extremities freely/spontaneously   PSYCH: Cooperative, behavior is appropriate            Data   Recent Labs   Lab 03/01/23  1718 02/27/23  1028   HGB 11.5*  --    GLC  --  105*

## 2023-03-03 NOTE — DISCHARGE SUMMARY
ORTHOPEDIC DISCHARGE SUMMARY       Serenity Jolly,  1975, MRN [id number]    Admission Date: 2023      Admission Diagnoses: Spinal stenosis, lumbar [M48.061]  Spondylolisthesis [M43.10]  Lumbar back pain [M54.50]     Discharge Date:  23     Post-operative Day:  4 Days Post-Op    Reason for Admission: The patient was admitted for the following: Procedure(s):  RIGHT LUMBAR 4-5 TRANSFORAMINAL LUMBAR INTERBODY FUSION  WITH LEFT LUMBAR 4-5 FACETECTOMY USING STEALTH NAVIGATION    BRIEF HOSPITAL COURSE   Serenity Jolly is a pleasant 48 year old female who underwent the aforementioned procedure with Dr. Tao on 23. There were no intraoperative complications and the patient was transferred to the recovery room and later the orthopedic unit in stable condition. Once the patient reached the orthopedic floor our orthopedic pain protocol was implemented along with the following:    Anticoagulation Medications: None  Therapy: None  Activity: WBAT  Bracing: LSO    Consultations during Admission: Hospitalist service for medical management     COMPLICATIONS/SIGNIFICANT FINDINGS    NONE    DISCHARGE INFORMATION   Condition at discharge: Good  Discharge destination: Home  Patient was seen by myself on the date of discharge.    FOLLOW UP CARE   Follow up with orthopedics in 2 weeks or sooner should the need arise. Ortho will continue to manage pain control, post op anticoagulation and incision care.     Follow up with your PCP for management of chronic medical problems and to evaluate for post op medical complications including constipation, nausea/vomiting, DVT/PE, anemia, changes in blood pressure, fevers/chills, urinary retention and atelectasis/pneumonia.     Subjective   Patient is doing well on POD #1. Pain is well controlled with oral medications. Ambulating. Tolerating oral intake.     Physical Exam   /69 (BP Location: Right arm)   Pulse 87   Temp 97.5  F (36.4  C) (Oral)   Resp 16  "  Ht 1.676 m (5' 6\")   Wt 70.5 kg (155 lb 6.4 oz)   SpO2 95%   BMI 25.08 kg/m    The patient is A&Ox3. Appears comfortable.   Sensation is intact to L2-S1 bilaterally  Dorsiflexion and plantar flexion is intact.  Dorsalis pedis pulse intact.  LLE Motor: 5/5 quads, 5/5 hip flexors, 5/5 gastroc, 5/5 tib ant, 5/5 ehl  RLE Motor: 5/5 quads, 5/5 hip flexors, 5/5 gastroc, 5/5 tib ant, 5/5 ehl   Calves are soft and non-tender. Negative Loyda's.  The incision is covered. Dressing C/D/I.    Pertinent Results at Discharge     Hemoglobin   Date/Time Value Ref Range Status   03/01/2023 05:18 PM 11.5 (L) 11.7 - 15.7 g/dL Final   02/23/2022 07:03 PM 13.6 11.7 - 15.7 g/dL Final   01/27/2021 03:09 PM 12.8 11.7 - 15.7 g/dL Final   12/24/2019 07:34 AM 12.7 11.7 - 15.7 g/dL Final   11/08/2019 06:42 AM 13.5 11.7 - 15.7 g/dL Final     Platelet Count   Date/Time Value Ref Range Status   02/23/2022 07:03  150 - 450 10e3/uL Final   01/27/2021 03:09  150 - 450 10e9/L Final   12/24/2019 07:34  150 - 450 10e9/L Final   11/08/2019 06:42  150 - 450 10e9/L Final       Problem List   Principal Problem:    Lumbar back pain      Addis Anderson PA-C/Dr. Tao  [facility]  [phone]  Date: 3/3/2023  Time: 11:51 AM    "

## 2023-03-03 NOTE — PROGRESS NOTES
Care Management Discharge Note    Discharge Date: 03/03/2023     Discharge Disposition: Home    Discharge Transportation: family or friend will provide  Additional Information:  Reviewed at discharge. No needs from CM at this time. Pt to return home with family.         HUMBERTO Chen

## 2023-09-02 ENCOUNTER — HEALTH MAINTENANCE LETTER (OUTPATIENT)
Age: 48
End: 2023-09-02

## 2024-10-20 ENCOUNTER — HEALTH MAINTENANCE LETTER (OUTPATIENT)
Age: 49
End: 2024-10-20

## (undated) DEVICE — DRAPE TOWEL IMPERVIOUS X2 7553

## (undated) DEVICE — GOWN IMPERVIOUS BREATHABLE 2XL/XLONG

## (undated) DEVICE — DRAPE O ARM TUBE 9732722

## (undated) DEVICE — SOL NACL 0.9% IRRIG 1000ML BOTTLE 2F7124

## (undated) DEVICE — SUTURE VICRYL+ 2-0 CT-2 27" UND VCP269H

## (undated) DEVICE — CATH IV ANGIO INTRO 14GA X 1 3/4" 381467

## (undated) DEVICE — GLOVE BIOGEL PI ULTRATOUCH G SZ 8.5 42185

## (undated) DEVICE — WRAP LUMBAR COMPRESS COLD THERAPY 4632P

## (undated) DEVICE — PLATE GROUNDING ADULT W/CORD 9165L

## (undated) DEVICE — MARKER SPHERES PASSIVE MEDT PACK 5 8801075

## (undated) DEVICE — DRAPE SHEET REV FOLD 3/4 9349

## (undated) DEVICE — GLOVE BIOGEL PI INDICATOR 8.0 LF 41680

## (undated) DEVICE — ADH SKIN CLOSURE PREMIERPRO EXOFIN 1.0ML 3470

## (undated) DEVICE — CATH TRAY FOLEY SURESTEP 16FR DRAIN BAG STATOCK A899916

## (undated) DEVICE — RX SURGIFLO HEMOSTATIC MATRIX 8ML 2991

## (undated) DEVICE — SU STRATAFIX PDS PLUS 1 CT-1 18" SXPP1A404

## (undated) DEVICE — CUSHION INSERT LG PRONE VIEW JACKSON TABLE

## (undated) DEVICE — SU MONOCRYL 3-0 PS-2 18" UND MCP497G

## (undated) DEVICE — GOWN XLG DISP 9545

## (undated) DEVICE — DRAPE STERI TOWEL LG 1010

## (undated) DEVICE — GLOVE UNDER INDICATOR PI SZ 8.5 LF 41685

## (undated) DEVICE — KIT DRAIN CLOSED WOUND SUCTION MED 400ML RESVR

## (undated) DEVICE — GLOVE BIOGEL PI SZ 8.0 40880

## (undated) DEVICE — PACK COLD 6 X 10 1-HOUR 0814-0610

## (undated) DEVICE — Device

## (undated) DEVICE — GLOVE BIOGEL PI SZ 7.0 40870

## (undated) DEVICE — GLOVE UNDER INDICATOR PI SZ 7.0 LF 41670

## (undated) DEVICE — GOWN IMPERVIOUS BREATHABLE SMART XLG 89045

## (undated) DEVICE — DRSG BIOPATCH GERMICIDAL SPLIT SPONGE 4MM MED 4150

## (undated) DEVICE — SYR BULB IRRIG DOVER 60 ML LATEX FREE 67000

## (undated) DEVICE — DECANTER VIAL 2006S

## (undated) DEVICE — CUSTOM PACK LUMBAR FUSION SNE5BLFHEA

## (undated) DEVICE — ESU PENCIL SMOKE EVAC W/ROCKER SWITCH 0703-047-000

## (undated) DEVICE — ESU ELEC BLADE 2.75" COATED/INSULATED E1455

## (undated) DEVICE — DRESSING PRIMAPORE 4 X 3-1/8 66000317

## (undated) DEVICE — POSITIONER ARM CRADLE LAMINECTOMY DISP

## (undated) DEVICE — SOL WATER IRRIG 1000ML BOTTLE 2F7114

## (undated) DEVICE — MARKER SPHERES PASSIVE MEDT PACK 1 8801071

## (undated) DEVICE — PACK MINOR SINGLE BASIN SSK3001

## (undated) DEVICE — CELL SAVER

## (undated) DEVICE — TOOL DISSECT MIDAS MR8 14CM MATCH HEAD 3MM MR8-14MH30

## (undated) DEVICE — SUTURE VICRYL+ 1 27IN CT-2 VLT VCP335H

## (undated) DEVICE — BLADE BONE MILL STRK 5.0MM MED 5400-701-000

## (undated) DEVICE — DRAPE BACK TABLE PADDED 60X90

## (undated) RX ORDER — FENTANYL CITRATE-0.9 % NACL/PF 10 MCG/ML
PLASTIC BAG, INJECTION (ML) INTRAVENOUS
Status: DISPENSED
Start: 2023-02-27

## (undated) RX ORDER — LIDOCAINE HYDROCHLORIDE 10 MG/ML
INJECTION, SOLUTION EPIDURAL; INFILTRATION; INTRACAUDAL; PERINEURAL
Status: DISPENSED
Start: 2023-02-27

## (undated) RX ORDER — ONDANSETRON 2 MG/ML
INJECTION INTRAMUSCULAR; INTRAVENOUS
Status: DISPENSED
Start: 2023-02-27